# Patient Record
Sex: MALE | Race: WHITE | NOT HISPANIC OR LATINO | Employment: OTHER | URBAN - METROPOLITAN AREA
[De-identification: names, ages, dates, MRNs, and addresses within clinical notes are randomized per-mention and may not be internally consistent; named-entity substitution may affect disease eponyms.]

---

## 2018-01-07 ENCOUNTER — HOSPITAL ENCOUNTER (INPATIENT)
Facility: OTHER | Age: 78
LOS: 1 days | Discharge: HOME HEALTH | DRG: 554 | End: 2018-01-11
Attending: EMERGENCY MEDICINE | Admitting: HOSPITALIST
Payer: MEDICARE

## 2018-01-07 DIAGNOSIS — M25.061 HEMARTHROSIS INVOLVING KNEE JOINT, RIGHT: ICD-10-CM

## 2018-01-07 DIAGNOSIS — I10 ESSENTIAL HYPERTENSION: ICD-10-CM

## 2018-01-07 DIAGNOSIS — D72.829 LEUKOCYTOSIS, UNSPECIFIED TYPE: ICD-10-CM

## 2018-01-07 DIAGNOSIS — L03.115 CELLULITIS OF RIGHT KNEE: ICD-10-CM

## 2018-01-07 DIAGNOSIS — Z79.01 CHRONIC ANTICOAGULATION: ICD-10-CM

## 2018-01-07 DIAGNOSIS — I50.42 HEART FAILURE, SYSTOLIC AND DIASTOLIC, CHRONIC: ICD-10-CM

## 2018-01-07 DIAGNOSIS — M25.561 ACUTE PAIN OF RIGHT KNEE: Primary | ICD-10-CM

## 2018-01-07 DIAGNOSIS — I48.20 CHRONIC ATRIAL FIBRILLATION: ICD-10-CM

## 2018-01-07 DIAGNOSIS — E78.00 HYPERCHOLESTEROLEMIA: ICD-10-CM

## 2018-01-07 DIAGNOSIS — I50.9 CHF (CONGESTIVE HEART FAILURE): ICD-10-CM

## 2018-01-07 LAB
ANION GAP SERPL CALC-SCNC: 10 MMOL/L
BACTERIA #/AREA URNS HPF: NORMAL /HPF
BASOPHILS # BLD AUTO: 0.02 K/UL
BASOPHILS NFR BLD: 0.1 %
BILIRUB UR QL STRIP: NEGATIVE
BUN SERPL-MCNC: 22 MG/DL
CALCIUM SERPL-MCNC: 9.4 MG/DL
CHLORIDE SERPL-SCNC: 95 MMOL/L
CLARITY UR: CLEAR
CO2 SERPL-SCNC: 27 MMOL/L
COLOR UR: YELLOW
CREAT SERPL-MCNC: 1.1 MG/DL
CRP SERPL-MCNC: 119.9 MG/L
DIFFERENTIAL METHOD: ABNORMAL
EOSINOPHIL # BLD AUTO: 0.1 K/UL
EOSINOPHIL NFR BLD: 0.7 %
ERYTHROCYTE [DISTWIDTH] IN BLOOD BY AUTOMATED COUNT: 15.4 %
ERYTHROCYTE [SEDIMENTATION RATE] IN BLOOD BY WESTERGREN METHOD: 38 MM/HR
EST. GFR  (AFRICAN AMERICAN): >60 ML/MIN/1.73 M^2
EST. GFR  (NON AFRICAN AMERICAN): >60 ML/MIN/1.73 M^2
GLUCOSE SERPL-MCNC: 124 MG/DL
GLUCOSE UR QL STRIP: NEGATIVE
HCT VFR BLD AUTO: 36.5 %
HGB BLD-MCNC: 12.3 G/DL
HGB UR QL STRIP: ABNORMAL
HYALINE CASTS #/AREA URNS LPF: 0 /LPF
KETONES UR QL STRIP: NEGATIVE
LEUKOCYTE ESTERASE UR QL STRIP: NEGATIVE
LYMPHOCYTES # BLD AUTO: 1.8 K/UL
LYMPHOCYTES NFR BLD: 11.1 %
MCH RBC QN AUTO: 28.9 PG
MCHC RBC AUTO-ENTMCNC: 33.7 G/DL
MCV RBC AUTO: 86 FL
MICROSCOPIC COMMENT: NORMAL
MONOCYTES # BLD AUTO: 2.1 K/UL
MONOCYTES NFR BLD: 12.3 %
NEUTROPHILS # BLD AUTO: 12.5 K/UL
NEUTROPHILS NFR BLD: 74.8 %
NITRITE UR QL STRIP: NEGATIVE
PH UR STRIP: 6 [PH] (ref 5–8)
PLATELET # BLD AUTO: 360 K/UL
PMV BLD AUTO: 9.9 FL
POTASSIUM SERPL-SCNC: 3.9 MMOL/L
PROT UR QL STRIP: ABNORMAL
RBC # BLD AUTO: 4.25 M/UL
RBC #/AREA URNS HPF: 2 /HPF (ref 0–4)
SODIUM SERPL-SCNC: 132 MMOL/L
SP GR UR STRIP: 1.01 (ref 1–1.03)
SQUAMOUS #/AREA URNS HPF: 4 /HPF
URN SPEC COLLECT METH UR: ABNORMAL
UROBILINOGEN UR STRIP-ACNC: NEGATIVE EU/DL
WBC # BLD AUTO: 16.64 K/UL
WBC #/AREA URNS HPF: 0 /HPF (ref 0–5)
WBC CLUMPS URNS QL MICRO: NORMAL
YEAST URNS QL MICRO: NORMAL

## 2018-01-07 PROCEDURE — 87040 BLOOD CULTURE FOR BACTERIA: CPT

## 2018-01-07 PROCEDURE — 80048 BASIC METABOLIC PNL TOTAL CA: CPT

## 2018-01-07 PROCEDURE — 85651 RBC SED RATE NONAUTOMATED: CPT

## 2018-01-07 PROCEDURE — 96361 HYDRATE IV INFUSION ADD-ON: CPT

## 2018-01-07 PROCEDURE — 81000 URINALYSIS NONAUTO W/SCOPE: CPT

## 2018-01-07 PROCEDURE — 96365 THER/PROPH/DIAG IV INF INIT: CPT

## 2018-01-07 PROCEDURE — 99284 EMERGENCY DEPT VISIT MOD MDM: CPT | Mod: 25

## 2018-01-07 PROCEDURE — 25000003 PHARM REV CODE 250: Performed by: EMERGENCY MEDICINE

## 2018-01-07 PROCEDURE — 85025 COMPLETE CBC W/AUTO DIFF WBC: CPT

## 2018-01-07 PROCEDURE — 86140 C-REACTIVE PROTEIN: CPT

## 2018-01-07 PROCEDURE — G0378 HOSPITAL OBSERVATION PER HR: HCPCS

## 2018-01-07 PROCEDURE — 96375 TX/PRO/DX INJ NEW DRUG ADDON: CPT

## 2018-01-07 PROCEDURE — 11000001 HC ACUTE MED/SURG PRIVATE ROOM

## 2018-01-07 PROCEDURE — 63600175 PHARM REV CODE 636 W HCPCS: Performed by: EMERGENCY MEDICINE

## 2018-01-07 RX ORDER — SODIUM CHLORIDE 9 MG/ML
1000 INJECTION, SOLUTION INTRAVENOUS
Status: COMPLETED | OUTPATIENT
Start: 2018-01-07 | End: 2018-01-07

## 2018-01-07 RX ORDER — CEFTRIAXONE 1 G/1
1 INJECTION, POWDER, FOR SOLUTION INTRAMUSCULAR; INTRAVENOUS
Status: COMPLETED | OUTPATIENT
Start: 2018-01-07 | End: 2018-01-07

## 2018-01-07 RX ORDER — MORPHINE SULFATE 2 MG/ML
6 INJECTION, SOLUTION INTRAMUSCULAR; INTRAVENOUS
Status: COMPLETED | OUTPATIENT
Start: 2018-01-07 | End: 2018-01-07

## 2018-01-07 RX ADMIN — MORPHINE SULFATE 6 MG: 2 INJECTION, SOLUTION INTRAMUSCULAR; INTRAVENOUS at 10:01

## 2018-01-07 RX ADMIN — VANCOMYCIN HYDROCHLORIDE 1250 MG: 1 INJECTION, POWDER, LYOPHILIZED, FOR SOLUTION INTRAVENOUS at 11:01

## 2018-01-07 RX ADMIN — SODIUM CHLORIDE 1000 ML: 0.9 INJECTION, SOLUTION INTRAVENOUS at 09:01

## 2018-01-07 RX ADMIN — CEFTRIAXONE 1 G: 1 INJECTION, POWDER, FOR SOLUTION INTRAMUSCULAR; INTRAVENOUS at 11:01

## 2018-01-08 PROBLEM — L03.115 CELLULITIS OF RIGHT KNEE: Status: ACTIVE | Noted: 2018-01-07

## 2018-01-08 LAB
DIASTOLIC DYSFUNCTION: NO
ESTIMATED PA SYSTOLIC PRESSURE: 31.52
GLOBAL PERICARDIAL EFFUSION: NORMAL
RETIRED EF AND QEF - SEE NOTES: 50 (ref 55–65)
VANCOMYCIN TROUGH SERPL-MCNC: 5.4 UG/ML

## 2018-01-08 PROCEDURE — 99220 PR INITIAL OBSERVATION CARE,LEVL III: CPT | Mod: ,,, | Performed by: NURSE PRACTITIONER

## 2018-01-08 PROCEDURE — 93306 TTE W/DOPPLER COMPLETE: CPT

## 2018-01-08 PROCEDURE — G0378 HOSPITAL OBSERVATION PER HR: HCPCS

## 2018-01-08 PROCEDURE — 87070 CULTURE OTHR SPECIMN AEROBIC: CPT

## 2018-01-08 PROCEDURE — 25000003 PHARM REV CODE 250

## 2018-01-08 PROCEDURE — 63600175 PHARM REV CODE 636 W HCPCS: Performed by: NURSE PRACTITIONER

## 2018-01-08 PROCEDURE — 11000001 HC ACUTE MED/SURG PRIVATE ROOM

## 2018-01-08 PROCEDURE — 25000003 PHARM REV CODE 250: Performed by: NURSE PRACTITIONER

## 2018-01-08 PROCEDURE — 87075 CULTR BACTERIA EXCEPT BLOOD: CPT

## 2018-01-08 PROCEDURE — 36415 COLL VENOUS BLD VENIPUNCTURE: CPT

## 2018-01-08 PROCEDURE — 80202 ASSAY OF VANCOMYCIN: CPT

## 2018-01-08 PROCEDURE — 0S9C3ZX DRAINAGE OF RIGHT KNEE JOINT, PERCUTANEOUS APPROACH, DIAGNOSTIC: ICD-10-PCS | Performed by: ORTHOPAEDIC SURGERY

## 2018-01-08 RX ORDER — ALLOPURINOL 100 MG/1
100 TABLET ORAL DAILY
Status: DISCONTINUED | OUTPATIENT
Start: 2018-01-08 | End: 2018-01-11 | Stop reason: HOSPADM

## 2018-01-08 RX ORDER — MORPHINE SULFATE 2 MG/ML
6 INJECTION, SOLUTION INTRAMUSCULAR; INTRAVENOUS EVERY 4 HOURS PRN
Status: DISCONTINUED | OUTPATIENT
Start: 2018-01-08 | End: 2018-01-11 | Stop reason: HOSPADM

## 2018-01-08 RX ORDER — FINASTERIDE 5 MG/1
5 TABLET, FILM COATED ORAL DAILY
Status: DISCONTINUED | OUTPATIENT
Start: 2018-01-08 | End: 2018-01-11 | Stop reason: HOSPADM

## 2018-01-08 RX ORDER — IBUPROFEN 200 MG
200 CAPSULE ORAL DAILY
Status: DISCONTINUED | OUTPATIENT
Start: 2018-01-08 | End: 2018-01-11 | Stop reason: HOSPADM

## 2018-01-08 RX ORDER — DIGOXIN 125 MCG
0.12 TABLET ORAL DAILY
Status: DISCONTINUED | OUTPATIENT
Start: 2018-01-08 | End: 2018-01-09

## 2018-01-08 RX ORDER — METOPROLOL SUCCINATE 25 MG/1
25 TABLET, EXTENDED RELEASE ORAL DAILY
Status: DISCONTINUED | OUTPATIENT
Start: 2018-01-08 | End: 2018-01-11 | Stop reason: HOSPADM

## 2018-01-08 RX ORDER — ROSUVASTATIN CALCIUM 10 MG/1
40 TABLET, COATED ORAL DAILY
Status: DISCONTINUED | OUTPATIENT
Start: 2018-01-08 | End: 2018-01-11 | Stop reason: HOSPADM

## 2018-01-08 RX ORDER — CLONIDINE HYDROCHLORIDE 0.1 MG/1
0.1 TABLET ORAL 3 TIMES DAILY
Status: DISCONTINUED | OUTPATIENT
Start: 2018-01-08 | End: 2018-01-11 | Stop reason: HOSPADM

## 2018-01-08 RX ORDER — CHOLECALCIFEROL (VITAMIN D3) 25 MCG
1000 TABLET ORAL DAILY
Status: DISCONTINUED | OUTPATIENT
Start: 2018-01-08 | End: 2018-01-11 | Stop reason: HOSPADM

## 2018-01-08 RX ORDER — FUROSEMIDE 20 MG/1
20 TABLET ORAL DAILY
Status: DISCONTINUED | OUTPATIENT
Start: 2018-01-08 | End: 2018-01-11 | Stop reason: HOSPADM

## 2018-01-08 RX ORDER — OXYCODONE AND ACETAMINOPHEN 5; 325 MG/1; MG/1
1 TABLET ORAL EVERY 4 HOURS PRN
Status: DISCONTINUED | OUTPATIENT
Start: 2018-01-08 | End: 2018-01-11 | Stop reason: HOSPADM

## 2018-01-08 RX ADMIN — CLONIDINE HYDROCHLORIDE 0.1 MG: 0.1 TABLET ORAL at 01:01

## 2018-01-08 RX ADMIN — FINASTERIDE 5 MG: 5 TABLET, FILM COATED ORAL at 09:01

## 2018-01-08 RX ADMIN — VITAMIN D, TAB 1000IU (100/BT) 1000 UNITS: 25 TAB at 09:01

## 2018-01-08 RX ADMIN — CLONIDINE HYDROCHLORIDE 0.1 MG: 0.1 TABLET ORAL at 05:01

## 2018-01-08 RX ADMIN — CALCIUM CITRATE 200 MG (950 MG) TABLET 200 MG: at 09:01

## 2018-01-08 RX ADMIN — ROSUVASTATIN CALCIUM 40 MG: 10 TABLET, FILM COATED ORAL at 09:01

## 2018-01-08 RX ADMIN — CLONIDINE HYDROCHLORIDE 0.1 MG: 0.1 TABLET ORAL at 09:01

## 2018-01-08 RX ADMIN — OXYCODONE HYDROCHLORIDE AND ACETAMINOPHEN 1 TABLET: 5; 325 TABLET ORAL at 01:01

## 2018-01-08 RX ADMIN — OXYCODONE HYDROCHLORIDE AND ACETAMINOPHEN 1 TABLET: 5; 325 TABLET ORAL at 09:01

## 2018-01-08 RX ADMIN — VANCOMYCIN HYDROCHLORIDE 1250 MG: 10 INJECTION, POWDER, LYOPHILIZED, FOR SOLUTION INTRAVENOUS at 11:01

## 2018-01-08 RX ADMIN — METOPROLOL SUCCINATE 25 MG: 25 TABLET, EXTENDED RELEASE ORAL at 09:01

## 2018-01-08 RX ADMIN — FUROSEMIDE 20 MG: 20 TABLET ORAL at 09:01

## 2018-01-08 RX ADMIN — OXYCODONE HYDROCHLORIDE AND ACETAMINOPHEN 1 TABLET: 5; 325 TABLET ORAL at 05:01

## 2018-01-08 RX ADMIN — ALLOPURINOL 100 MG: 100 TABLET ORAL at 09:01

## 2018-01-08 RX ADMIN — DIGOXIN 0.12 MG: 125 TABLET ORAL at 09:01

## 2018-01-08 NOTE — SUBJECTIVE & OBJECTIVE
Past Medical History:   Diagnosis Date    A-fib     Carotid artery stenosis 8/28/2014 8/26/2014: Carotid duplex: DIANNA: mild. LICA: critical - 3.4 m/s.    CHF (congestive heart failure)     Chronic anticoagulation 8/28/2014    Heart failure, systolic and diastolic, chronic 11/17/2016 8/1/2016: Echo: Normal left ventricular size with mild systolic dysfunction. EF 45%. Markedly dilated LA. Moderate aortic valve sclerosis - 1.6 m/s.    Herniated disc     Hypercholesterolemia 11/17/2016 2004: Began statin.    Hypertension     Hypertension 11/17/2016 1990: Diagnosed.    Lipids abnormal     OA (osteoarthritis)     Osteopenia     Peripheral artery disease 12/1/2016    10/25/2016: Arterial Duplex: Right: SFA: Distal 1.8 m/s. Left: No obstructive disease above knee.    Stroke     2004       Past Surgical History:   Procedure Laterality Date    BLADDER TUMOR EXCISION      CAROTID ENDARTERECTOMY      Left    EYE SURGERY      bilateral kory    HERNIA REPAIR      knee scope Right     SKIN BIOPSY      ca    TONSILLECTOMY      tumors removed chest Bilateral        Review of patient's allergies indicates:  No Known Allergies    No current facility-administered medications on file prior to encounter.      Current Outpatient Prescriptions on File Prior to Encounter   Medication Sig    allopurinol (ZYLOPRIM) 100 MG tablet Take 100 mg by mouth once daily.    ascorbic acid (VITAMIN C) 100 MG tablet Take 100 mg by mouth once daily.    calcium citrate (CALCITRATE) 200 mg (950 mg) tablet Take 1 tablet by mouth once daily.    cloNIDine (CATAPRES) 0.1 MG tablet TK 1 T PO Q 8 H    co-enzyme Q-10 50 mg capsule Take 50 mg by mouth once daily.    desonide (DESOWEN) 0.05 % lotion     digoxin (LANOXIN) 125 mcg tablet TAKE 1 TABLET ONE TIME DAILY    finasteride (PROSCAR) 5 mg tablet Take 5 mg by mouth once daily.    fish oil-omega-3 fatty acids 300-1,000 mg capsule Take 2 g by mouth once daily.     furosemide (LASIX) 40 MG tablet Take 1 tablet (40 mg total) by mouth once daily. (Patient taking differently: Take 20 mg by mouth once daily. )    glucosamine-chondroitin 500-400 mg tablet Take 1 tablet by mouth 3 (three) times daily.    metoprolol succinate (TOPROL-XL) 25 MG 24 hr tablet TAKE 1 TABLET ONE TIME DAILY    MULTIVITAMIN W-MINERALS/LUTEIN (CENTRUM SILVER ORAL) Take by mouth.    mupirocin (BACTROBAN) 2 % ointment Apply topically 3 (three) times daily.    oxycodone-acetaminophen (PERCOCET) 5-325 mg per tablet TK 1 T PO  Q 6 H PRN P    potassium chloride (MICRO-K) 10 MEQ CpSR     PREVNAR 13, PF, 0.5 mL Syrg     rosuvastatin (CRESTOR) 20 MG tablet Take 40 mg by mouth once daily.     vitamin D 1000 units Tab Take 185 mg by mouth once daily.    warfarin (COUMADIN) 5 MG tablet TAKE 1 TABLET DAILY. DOSE TO BE ADJUSTED ACCORDING TO INR.     Family History     None        Social History Main Topics    Smoking status: Never Smoker    Smokeless tobacco: Never Used    Alcohol use Yes      Comment: couple times yearly     Drug use: No    Sexual activity: Not on file     Review of Systems   Constitutional: Positive for activity change, fatigue and fever. Negative for appetite change.   HENT: Negative for congestion, ear pain and postnasal drip.    Eyes: Negative for discharge.   Respiratory: Negative for apnea, cough, shortness of breath and wheezing.    Cardiovascular: Negative for chest pain and leg swelling.   Gastrointestinal: Negative for abdominal distention, abdominal pain, diarrhea, nausea and vomiting.   Endocrine: Negative for polydipsia, polyphagia and polyuria.   Genitourinary: Negative for difficulty urinating, flank pain, frequency, hematuria and urgency.   Musculoskeletal: Positive for arthralgias, gait problem and joint swelling.   Skin: Negative for pallor and rash.   Allergic/Immunologic: Negative for environmental allergies and food allergies.   Neurological: Negative for dizziness,  speech difficulty, weakness, light-headedness and headaches.   Hematological: Does not bruise/bleed easily.   Psychiatric/Behavioral: Negative for agitation.     Objective:     Vital Signs (Most Recent):  Temp: 98.5 °F (36.9 °C) (01/08/18 0109)  Pulse: 97 (01/08/18 0110)  Resp: 16 (01/08/18 0109)  BP: 136/67 (01/08/18 0110)  SpO2: 99 % (01/08/18 0109) Vital Signs (24h Range):  Temp:  [98.3 °F (36.8 °C)-98.5 °F (36.9 °C)] 98.5 °F (36.9 °C)  Pulse:  [75-97] 97  Resp:  [16] 16  SpO2:  [95 %-99 %] 99 %  BP: (131-144)/(67-77) 136/67     Weight: 80.5 kg (177 lb 6.4 oz)  Body mass index is 26.97 kg/m².    Physical Exam   Constitutional: He is oriented to person, place, and time. He appears well-developed and well-nourished.   HENT:   Head: Normocephalic.   Eyes: Conjunctivae are normal.   Neck: Normal range of motion. Neck supple.   Cardiovascular: S2 normal and intact distal pulses.  An irregularly irregular rhythm present. Tachycardia present.    Pulmonary/Chest: Effort normal. He has decreased breath sounds in the right lower field.   Abdominal: Soft. Bowel sounds are normal. He exhibits no distension. There is no tenderness.   Musculoskeletal:        Right knee: He exhibits decreased range of motion, swelling and erythema.   Neurological: He is alert and oriented to person, place, and time.   Skin: Skin is warm and dry.   Psychiatric: He has a normal mood and affect. His speech is normal and behavior is normal. Thought content normal.           Significant Labs:   CBC:   Recent Labs  Lab 01/06/18 1821 01/07/18 2130   WBC 22.10* 16.64*   HGB 14.3 12.3*   HCT 43.6 36.5*   * 360*     CMP:   Recent Labs  Lab 01/06/18  1821 01/07/18  2130    132*   K 4.0 3.9   CL 99 95   CO2 28 27   * 124*   BUN 25* 22   CREATININE 1.2 1.1   CALCIUM 9.2 9.4   PROT 7.2  --    ALBUMIN 3.5  --    BILITOT 0.6  --    ALKPHOS 57  --    AST 17  --    ALT 18  --    ANIONGAP 11 10   EGFRNONAA 57* >60     Coagulation:   Recent  Labs  Lab 01/06/18 1958   INR 4.4*       Significant Imaging: I have reviewed all pertinent imaging results/findings within the past 24 hours.

## 2018-01-08 NOTE — HPI
"Patient is a 77 y.o. male who presents to the ED with complaint of right knee pain. He has had intermittent chronic right knee pain for over twenty years s/p arthroscopic knee surgery, and is followed by Dr. Gonzalez, orthopedic surgery. Wife reports onset of current episode of knee pain two weeks ago, and states "this is the worst it has ever been." Patient woke up unable to walk or bear weight secondary to pain two weeks ago. He was seen by Dr. Gonzalez at that time and reportedly had fluid drained and was given a cortisone shot with temporary improvement. 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 Gonzalez's PA two days ago and had fluid drained and was given cortisone injection again without relief. He was seen here yesterday for intractable pain, and had a dry tap. He reports temporary relief to pain with morphine yesterday. Patient reports worsening of pain since being discharged yesterday, and currently complains of pain and swelling of the knee. Pain radiates from the knee into the distal thigh and is worse with movement. He is unable to bear weight secondary to pain. He denies fever, nausea, or vomiting. He has an appointment with physical therapy in two days, and is "starting the process of having the knee replaced." He has no additional complaints.  "

## 2018-01-08 NOTE — H&P
"[facility]  History & Physical    Patient Name: Alan Gutiérrez  MRN: [id number]  Admission Date: 1/7/2018  Attending Physician: Shirin Martinez MD   Primary Care Provider: Dave Luna Sr, MD         Patient information was obtained from patient, past medical records and ER records.     Subjective:     Principal Problem:Cellulitis of right knee    Chief Complaint:   Chief Complaint   Patient presents with    Knee Pain     "Im here for the same thing I was here for yesterday, my right knee hurts"        HPI: Patient is a 77 y.o. male who presents to the ED with complaint of right knee pain. He has had intermittent chronic right knee pain for over twenty years s/p arthroscopic knee surgery, and is followed by Dr. Gonzalez, orthopedic surgery. Wife reports onset of current episode of knee pain two weeks ago, and states "this is the worst it has ever been." Patient woke up unable to walk or bear weight secondary to pain two weeks ago. He was seen by Dr. Gonzalez at that time and reportedly had fluid drained and was given a cortisone shot with temporary improvement. 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 Gonzalez's PA two days ago and had fluid drained and was given cortisone injection again without relief. He was seen here yesterday for intractable pain, and had a dry tap. He reports temporary relief to pain with morphine yesterday. Patient reports worsening of pain since being discharged yesterday, and currently complains of pain and swelling of the knee. Pain radiates from the knee into the distal thigh and is worse with movement. He is unable to bear weight secondary to pain. He denies fever, nausea, or vomiting. He has an appointment with physical therapy in two days, and is "starting the process of having the knee replaced." He has no additional complaints.    Past Medical History: "   Diagnosis Date    A-fib     Carotid artery stenosis 8/28/2014 8/26/2014: Carotid duplex: DIANNA: mild. LICA: critical - 3.4 m/s.    CHF (congestive heart failure)     Chronic anticoagulation 8/28/2014    Heart failure, systolic and diastolic, chronic 11/17/2016 8/1/2016: Echo: Normal left ventricular size with mild systolic dysfunction. EF 45%. Markedly dilated LA. Moderate aortic valve sclerosis - 1.6 m/s.    Herniated disc     Hypercholesterolemia 11/17/2016 2004: Began statin.    Hypertension     Hypertension 11/17/2016 1990: Diagnosed.    Lipids abnormal     OA (osteoarthritis)     Osteopenia     Peripheral artery disease 12/1/2016    10/25/2016: Arterial Duplex: Right: SFA: Distal 1.8 m/s. Left: No obstructive disease above knee.    Stroke     2004       Past Surgical History:   Procedure Laterality Date    BLADDER TUMOR EXCISION      CAROTID ENDARTERECTOMY      Left    EYE SURGERY      bilateral kory    HERNIA REPAIR      knee scope Right     SKIN BIOPSY      ca    TONSILLECTOMY      tumors removed chest Bilateral        Review of patient's allergies indicates:  No Known Allergies    No current facility-administered medications on file prior to encounter.      Current Outpatient Prescriptions on File Prior to Encounter   Medication Sig    allopurinol (ZYLOPRIM) 100 MG tablet Take 100 mg by mouth once daily.    ascorbic acid (VITAMIN C) 100 MG tablet Take 100 mg by mouth once daily.    calcium citrate (CALCITRATE) 200 mg (950 mg) tablet Take 1 tablet by mouth once daily.    cloNIDine (CATAPRES) 0.1 MG tablet TK 1 T PO Q 8 H    co-enzyme Q-10 50 mg capsule Take 50 mg by mouth once daily.    desonide (DESOWEN) 0.05 % lotion     digoxin (LANOXIN) 125 mcg tablet TAKE 1 TABLET ONE TIME DAILY    finasteride (PROSCAR) 5 mg tablet Take 5 mg by mouth once daily.    fish oil-omega-3 fatty acids 300-1,000 mg capsule Take 2 g by mouth once daily.    furosemide (LASIX) 40 MG  tablet Take 1 tablet (40 mg total) by mouth once daily. (Patient taking differently: Take 20 mg by mouth once daily. )    glucosamine-chondroitin 500-400 mg tablet Take 1 tablet by mouth 3 (three) times daily.    metoprolol succinate (TOPROL-XL) 25 MG 24 hr tablet TAKE 1 TABLET ONE TIME DAILY    MULTIVITAMIN W-MINERALS/LUTEIN (CENTRUM SILVER ORAL) Take by mouth.    mupirocin (BACTROBAN) 2 % ointment Apply topically 3 (three) times daily.    oxycodone-acetaminophen (PERCOCET) 5-325 mg per tablet TK 1 T PO  Q 6 H PRN P    potassium chloride (MICRO-K) 10 MEQ CpSR     PREVNAR 13, PF, 0.5 mL Syrg     rosuvastatin (CRESTOR) 20 MG tablet Take 40 mg by mouth once daily.     vitamin D 1000 units Tab Take 185 mg by mouth once daily.    warfarin (COUMADIN) 5 MG tablet TAKE 1 TABLET DAILY. DOSE TO BE ADJUSTED ACCORDING TO INR.     Family History     None        Social History Main Topics    Smoking status: Never Smoker    Smokeless tobacco: Never Used    Alcohol use Yes      Comment: couple times yearly     Drug use: No    Sexual activity: Not on file     Review of Systems   Constitutional: Positive for activity change, fatigue and fever. Negative for appetite change.   HENT: Negative for congestion, ear pain and postnasal drip.    Eyes: Negative for discharge.   Respiratory: Negative for apnea, cough, shortness of breath and wheezing.    Cardiovascular: Negative for chest pain and leg swelling.   Gastrointestinal: Negative for abdominal distention, abdominal pain, diarrhea, nausea and vomiting.   Endocrine: Negative for polydipsia, polyphagia and polyuria.   Genitourinary: Negative for difficulty urinating, flank pain, frequency, hematuria and urgency.   Musculoskeletal: Positive for arthralgias, gait problem and joint swelling.   Skin: Negative for pallor and rash.   Allergic/Immunologic: Negative for environmental allergies and food allergies.   Neurological: Negative for dizziness, speech difficulty,  weakness, light-headedness and headaches.   Hematological: Does not bruise/bleed easily.   Psychiatric/Behavioral: Negative for agitation.     Objective:     Vital Signs (Most Recent):  Temp: 98.5 °F (36.9 °C) (01/08/18 0109)  Pulse: 97 (01/08/18 0110)  Resp: 16 (01/08/18 0109)  BP: 136/67 (01/08/18 0110)  SpO2: 99 % (01/08/18 0109) Vital Signs (24h Range):  Temp:  [98.3 °F (36.8 °C)-98.5 °F (36.9 °C)] 98.5 °F (36.9 °C)  Pulse:  [75-97] 97  Resp:  [16] 16  SpO2:  [95 %-99 %] 99 %  BP: (131-144)/(67-77) 136/67     Weight: 80.5 kg (177 lb 6.4 oz)  Body mass index is 26.97 kg/m².    Physical Exam   Constitutional: He is oriented to person, place, and time. He appears well-developed and well-nourished.   HENT:   Head: Normocephalic.   Eyes: Conjunctivae are normal.   Neck: Normal range of motion. Neck supple.   Cardiovascular: S2 normal and intact distal pulses.  An irregularly irregular rhythm present. Tachycardia present.    Pulmonary/Chest: Effort normal. He has decreased breath sounds in the right lower field.   Abdominal: Soft. Bowel sounds are normal. He exhibits no distension. There is no tenderness.   Musculoskeletal:        Right knee: He exhibits decreased range of motion, swelling and erythema.   Neurological: He is alert and oriented to person, place, and time.   Skin: Skin is warm and dry.   Psychiatric: He has a normal mood and affect. His speech is normal and behavior is normal. Thought content normal.           Significant Labs:   CBC:   Recent Labs  Lab 01/06/18 1821 01/07/18 2130   WBC 22.10* 16.64*   HGB 14.3 12.3*   HCT 43.6 36.5*   * 360*     CMP:   Recent Labs  Lab 01/06/18 1821 01/07/18 2130    132*   K 4.0 3.9   CL 99 95   CO2 28 27   * 124*   BUN 25* 22   CREATININE 1.2 1.1   CALCIUM 9.2 9.4   PROT 7.2  --    ALBUMIN 3.5  --    BILITOT 0.6  --    ALKPHOS 57  --    AST 17  --    ALT 18  --    ANIONGAP 11 10   EGFRNONAA 57* >60     Coagulation:   Recent Labs  Lab  01/06/18 1958   INR 4.4*       Significant Imaging: I have reviewed all pertinent imaging results/findings within the past 24 hours.    Assessment/Plan:     * Cellulitis of right knee    Right knee reddened, swollen. WBC elevated. Afebrile    Vancomycin/Rocephin continued  Oxygen PRN  Consult Ortho          Heart failure, systolic and diastolic, chronic    Appears stable currently    Echo pending  Continue digoxin, lasix, potassium,           Hypercholesterolemia    Lipid Panel pending  Continue Crestor          Hypertension    Currently normotensive    Continue clonidine, lasix, toprol          Chronic anticoagulation      Continue warfarin  Consult Cardiology        Atrial fibrillation    Chronic afib    Continue toprol, digoxin            VTE Risk Mitigation         Ordered     Medium Risk of VTE  Once      01/08/18 0157     Place sequential compression device  Until discontinued      01/08/18 0157             Fritz Garza NP  Department of Hospital Medicine   [facility] [place]

## 2018-01-08 NOTE — ASSESSMENT & PLAN NOTE
Right knee reddened, swollen. WBC elevated. Afebrile    Vancomycin/Rocephin continued  Oxygen PRN  Consult Ortho

## 2018-01-08 NOTE — PLAN OF CARE
01/08/18 1022   CHASE Message   Medicare Outpatient and Observation Notification regarding financial responsibility Given to patient/caregiver;Explained to patient/caregiver;Signed/date by patient/caregiver   Date CHASE was signed 01/08/18   Time CHASE was signed [id number]

## 2018-01-08 NOTE — PLAN OF CARE
Met with patient & wife at bedside to complete discharge assessment. Patient lives at home with wife who provides minimal assist as needed with ADLs. Patient has several pieces of DME at home but will need a bedside commode. Will follow up on any other DC needs      01/08/18 1022   Discharge Assessment   Assessment Type Discharge Planning Assessment   Confirmed/corrected address and phone number on facesheet? Yes   Assessment information obtained from? Patient   Communicated expected length of stay with patient/caregiver no   Prior to hospitilization cognitive status: Alert/Oriented   Prior to hospitalization functional status: Assistive Equipment;Needs Assistance   Current cognitive status: Alert/Oriented   Current Functional Status: Needs Assistance;Assistive Equipment   Lives With spouse   Able to Return to Prior Arrangements yes   Is patient able to care for self after discharge? Yes   Patient's perception of discharge disposition home or selfcare   Readmission Within The Last 30 Days no previous admission in last 30 days   Patient currently being followed by outpatient case management? No   Patient currently receives any other outside agency services? No   Equipment Currently Used at Home walker, rolling;wheelchair  (shower chair on wheels)   Do you have any problems affording any of your prescribed medications? No   Is the patient taking medications as prescribed? yes   Does the patient have transportation home? Yes   Transportation Available family or friend will provide   Does the patient receive services at the [facility]? No   Discharge Plan A Home with family   Discharge Plan B Home Health   Patient/Family In Agreement With Plan yes

## 2018-01-08 NOTE — ED PROVIDER NOTES
"Encounter Date: 1/7/2018    SCRIBE #1 NOTE: I, Mateo Connsoniajustina, am scribing for, and in the presence of, Dr. Yoder.       History     Chief Complaint   Patient presents with    Knee Pain     "Im here for the same thing I was here for yesterday, my right knee hurts"     8:47 PM    Patient is a 77 y.o. male who presents to the ED with complaint of right knee pain. He has had intermittent chronic right knee pain for over twenty years s/p arthroscopic knee surgery, and is followed by Dr. Gonzalez, orthopedic surgery. Wife reports onset of current episode of knee pain two weeks ago, and states "this is the worst it has ever been." Patient woke up unable to walk or bear weight secondary to pain two weeks ago. He was seen by Dr. Gonzalez at that time and reportedly had fluid drained and was given a cortisone shot with temporary improvement. 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 Gonzalez's PA two days ago and had fluid drained and was given cortisone injection again without relief. He was seen here yesterday for intractable pain, and had a dry tap. He reports temporary relief to pain with morphine yesterday. Patient reports worsening of pain since being discharged yesterday, and currently complains of pain and swelling of the knee. Pain radiates from the knee into the distal thigh and is worse with movement. He is unable to bear weight secondary to pain. He denies fever, nausea, or vomiting. He has an appointment with physical therapy in two days, and is "starting the process of having the knee replaced." He has no additional complaints.        The history is provided by the patient and the spouse.     Review of patient's allergies indicates:  No Known Allergies  Past Medical History:   Diagnosis Date    A-fib     Carotid artery stenosis 8/28/2014 8/26/2014: Carotid duplex: DIANNA: mild. LICA: critical - 3.4 m/s.    CHF " (congestive heart failure)     Chronic anticoagulation 8/28/2014    Heart failure, systolic and diastolic, chronic 11/17/2016 8/1/2016: Echo: Normal left ventricular size with mild systolic dysfunction. EF 45%. Markedly dilated LA. Moderate aortic valve sclerosis - 1.6 m/s.    Herniated disc     Hypercholesterolemia 11/17/2016    2004: Began statin.    Hypertension     Hypertension 11/17/2016 1990: Diagnosed.    Lipids abnormal     OA (osteoarthritis)     Osteopenia     Peripheral artery disease 12/1/2016    10/25/2016: Arterial Duplex: Right: SFA: Distal 1.8 m/s. Left: No obstructive disease above knee.    Stroke     2004     Past Surgical History:   Procedure Laterality Date    BLADDER TUMOR EXCISION      CAROTID ENDARTERECTOMY      Left    EYE SURGERY      bilateral kory    HERNIA REPAIR      knee scope Right     SKIN BIOPSY      ca    TONSILLECTOMY      tumors removed chest Bilateral      History reviewed. No pertinent family history.  Social History   Substance Use Topics    Smoking status: Never Smoker    Smokeless tobacco: Never Used    Alcohol use Yes      Comment: couple times yearly      Review of Systems   Constitutional: Negative for fever.   HENT: Negative for sore throat.    Respiratory: Negative for shortness of breath.    Cardiovascular: Negative for chest pain.   Gastrointestinal: Negative for nausea and vomiting.   Genitourinary: Negative for dysuria.   Musculoskeletal: Negative for back pain.        Positive for right knee pain.   Skin: Negative for rash.   Neurological: Negative for weakness.   Hematological: Does not bruise/bleed easily.   Psychiatric/Behavioral: Negative for confusion.       Physical Exam     Initial Vitals [01/07/18 2025]   BP Pulse Resp Temp SpO2   (!) 144/77 75 16 98.3 °F (36.8 °C) 98 %      MAP       99.33         Physical Exam    Nursing note and vitals reviewed.  Constitutional: He appears well-developed and well-nourished. No distress.   HENT:    Head: Normocephalic and atraumatic.   Eyes: Conjunctivae and EOM are normal.   Neck: Normal range of motion. Neck supple.   Cardiovascular: Normal rate, regular rhythm and normal heart sounds. Exam reveals no gallop and no friction rub.    No murmur heard.  Pulmonary/Chest: Breath sounds normal. No respiratory distress. He has no wheezes. He has no rhonchi. He has no rales.   Musculoskeletal:   Right knee is warm and swollen, but not ballotable or erythematous. Painful range of motion of the knee. Rest of joints are normal.   Neurological: He is alert and oriented to person, place, and time.   Skin: Skin is warm and dry.   No skin lesions or ecchymosis.   Psychiatric: He has a normal mood and affect. His behavior is normal. Judgment and thought content normal.         ED Course   Procedures  Labs Reviewed   BASIC METABOLIC PANEL - Abnormal; Notable for the following:        Result Value    Sodium 132 (*)     Glucose 124 (*)     All other components within normal limits   CBC W/ AUTO DIFFERENTIAL - Abnormal; Notable for the following:     WBC 16.64 (*)     RBC 4.25 (*)     Hemoglobin 12.3 (*)     Hematocrit 36.5 (*)     RDW 15.4 (*)     Platelets 360 (*)     Gran # 12.5 (*)     Mono # 2.1 (*)     Gran% 74.8 (*)     Lymph% 11.1 (*)     All other components within normal limits   URINALYSIS - Abnormal; Notable for the following:     Protein, UA 1+ (*)     Occult Blood UA Trace (*)     All other components within normal limits   C-REACTIVE PROTEIN - Abnormal; Notable for the following:     .9 (*)     All other components within normal limits   SEDIMENTATION RATE, MANUAL - Abnormal; Notable for the following:     Sed Rate 38 (*)     All other components within normal limits   CULTURE, BLOOD   CULTURE, BLOOD   URINALYSIS MICROSCOPIC             Medical Decision Making:   ED Management:  Elderly patient with chronic knee pain presents complaining of pain and swelling of the same knee.  He was evaluated here  yesterday.  I've reviewed this chart and spoken with the evaluating provider.  At that time arthrocentesis was attempted but unsuccessful.  Patient is felt to be low risk for septic arthritis and was discharged with pain medicine.  He reports worsening pain tonight.  Unable to bear weight, unable to care for by his elderly wife at home.  Here for admission for pain control and orthopedic evaluation.  I do not think it reasonable to attempt arthrocentesis again after this painful procedure was unsuccessful yesterday.  His leukocytosis is decreasing, however CRP is increasing.  I will draw blood cultures and start antibiotics until he may be evaluated by orthopedics in the morning.    I did have an extensive talk regarding signs to return for and need for follow up. Patient expressed understanding and will monitor symptoms closely and follow-up as needed.    MAURY Yoder M.D.  01/08/2018  5:21 AM              Scribe Attestation:   Scribe #1: I performed the above scribed service and the documentation accurately describes the services I performed. I attest to the accuracy of the note.    Attending Attestation:           Physician Attestation for Scribe:  Physician Attestation Statement for Scribe #1: I, Dr. Yoder, reviewed documentation, as scribed by Mateo Leslie in my presence, and it is both accurate and complete.                 ED Course      Clinical Impression:     1. Acute pain of right knee    2. CHF (congestive heart failure)                             Glen Yoder MD  01/08/18 0521

## 2018-01-09 PROBLEM — D72.829 LEUKOCYTOSIS: Status: ACTIVE | Noted: 2018-01-09

## 2018-01-09 LAB — URATE SERPL-MCNC: 4.8 MG/DL

## 2018-01-09 PROCEDURE — 25000003 PHARM REV CODE 250

## 2018-01-09 PROCEDURE — 63600175 PHARM REV CODE 636 W HCPCS: Performed by: NURSE PRACTITIONER

## 2018-01-09 PROCEDURE — 25000003 PHARM REV CODE 250: Performed by: NURSE PRACTITIONER

## 2018-01-09 PROCEDURE — 36415 COLL VENOUS BLD VENIPUNCTURE: CPT

## 2018-01-09 PROCEDURE — G0378 HOSPITAL OBSERVATION PER HR: HCPCS

## 2018-01-09 PROCEDURE — 84550 ASSAY OF BLOOD/URIC ACID: CPT

## 2018-01-09 PROCEDURE — 11000001 HC ACUTE MED/SURG PRIVATE ROOM

## 2018-01-09 PROCEDURE — 25000003 PHARM REV CODE 250: Performed by: HOSPITALIST

## 2018-01-09 PROCEDURE — 99233 SBSQ HOSP IP/OBS HIGH 50: CPT | Mod: ,,, | Performed by: HOSPITALIST

## 2018-01-09 RX ORDER — LOSARTAN POTASSIUM 100 MG/1
100 TABLET ORAL DAILY
COMMUNITY
End: 2019-03-26

## 2018-01-09 RX ORDER — NIFEDIPINE 60 MG/1
60 TABLET, EXTENDED RELEASE ORAL DAILY
COMMUNITY

## 2018-01-09 RX ORDER — NIFEDIPINE 30 MG/1
30 TABLET, EXTENDED RELEASE ORAL EVERY MORNING
COMMUNITY
End: 2020-10-15

## 2018-01-09 RX ORDER — HYDROCODONE BITARTRATE AND ACETAMINOPHEN 5; 325 MG/1; MG/1
1 TABLET ORAL EVERY 8 HOURS PRN
Status: ON HOLD | COMMUNITY
End: 2018-01-11

## 2018-01-09 RX ORDER — GABAPENTIN 100 MG/1
200 CAPSULE ORAL 3 TIMES DAILY
COMMUNITY
End: 2018-01-12

## 2018-01-09 RX ORDER — ROSUVASTATIN CALCIUM 40 MG/1
40 TABLET, COATED ORAL NIGHTLY
COMMUNITY

## 2018-01-09 RX ORDER — DIGOXIN 125 MCG
0.12 TABLET ORAL NIGHTLY
Status: DISCONTINUED | OUTPATIENT
Start: 2018-01-09 | End: 2018-01-11 | Stop reason: HOSPADM

## 2018-01-09 RX ORDER — POLYETHYLENE GLYCOL 3350 17 G/17G
17 POWDER, FOR SOLUTION ORAL DAILY PRN
Status: DISCONTINUED | OUTPATIENT
Start: 2018-01-09 | End: 2018-01-11 | Stop reason: HOSPADM

## 2018-01-09 RX ADMIN — CEFTRIAXONE 1 G: 1 INJECTION, SOLUTION INTRAVENOUS at 12:01

## 2018-01-09 RX ADMIN — OXYCODONE HYDROCHLORIDE AND ACETAMINOPHEN 1 TABLET: 5; 325 TABLET ORAL at 05:01

## 2018-01-09 RX ADMIN — CLONIDINE HYDROCHLORIDE 0.1 MG: 0.1 TABLET ORAL at 09:01

## 2018-01-09 RX ADMIN — OXYCODONE HYDROCHLORIDE AND ACETAMINOPHEN 1 TABLET: 5; 325 TABLET ORAL at 06:01

## 2018-01-09 RX ADMIN — OXYCODONE HYDROCHLORIDE AND ACETAMINOPHEN 1 TABLET: 5; 325 TABLET ORAL at 11:01

## 2018-01-09 RX ADMIN — CLONIDINE HYDROCHLORIDE 0.1 MG: 0.1 TABLET ORAL at 06:01

## 2018-01-09 RX ADMIN — OXYCODONE HYDROCHLORIDE AND ACETAMINOPHEN 1 TABLET: 5; 325 TABLET ORAL at 01:01

## 2018-01-09 RX ADMIN — VITAMIN D, TAB 1000IU (100/BT) 1000 UNITS: 25 TAB at 09:01

## 2018-01-09 RX ADMIN — VANCOMYCIN HYDROCHLORIDE 1250 MG: 10 INJECTION, POWDER, LYOPHILIZED, FOR SOLUTION INTRAVENOUS at 11:01

## 2018-01-09 RX ADMIN — ALLOPURINOL 100 MG: 100 TABLET ORAL at 09:01

## 2018-01-09 RX ADMIN — FINASTERIDE 5 MG: 5 TABLET, FILM COATED ORAL at 09:01

## 2018-01-09 RX ADMIN — DIGOXIN 0.12 MG: 125 TABLET ORAL at 09:01

## 2018-01-09 RX ADMIN — OXYCODONE HYDROCHLORIDE AND ACETAMINOPHEN 1 TABLET: 5; 325 TABLET ORAL at 12:01

## 2018-01-09 RX ADMIN — METOPROLOL SUCCINATE 25 MG: 25 TABLET, EXTENDED RELEASE ORAL at 09:01

## 2018-01-09 RX ADMIN — CALCIUM CITRATE 200 MG (950 MG) TABLET 200 MG: at 09:01

## 2018-01-09 RX ADMIN — ROSUVASTATIN CALCIUM 40 MG: 10 TABLET, FILM COATED ORAL at 09:01

## 2018-01-09 RX ADMIN — POLYETHYLENE GLYCOL 3350 17 G: 17 POWDER, FOR SOLUTION ORAL at 09:01

## 2018-01-09 RX ADMIN — CLONIDINE HYDROCHLORIDE 0.1 MG: 0.1 TABLET ORAL at 01:01

## 2018-01-09 RX ADMIN — FUROSEMIDE 20 MG: 20 TABLET ORAL at 09:01

## 2018-01-09 NOTE — PLAN OF CARE
Problem: Patient Care Overview  Goal: Plan of Care Review  Outcome: Ongoing (interventions implemented as appropriate)  Patient lying quietly in bed with eyes closed and wife at bedside. Discussed with patient and wife about medication orders, vancomycin trough schedule, and plan of care. Left forearm 20 gauge IV access intact. Patient and wife both verbalized understanding of instructions. Call light within reach. Encouraged patient and wife to call for any and all needs. Patient and wife both verbalized understanding of instructions. Will continue to monitor patient.

## 2018-01-09 NOTE — CONSULTS
[facility]  Cardiology  Consult Note    Patient Name: Alan Gutiérrez  MRN: [id number]  Admission Date: 1/7/2018  Hospital Length of Stay: 0 days  Code Status: Full Code   Attending Provider: Michael Boston MD   Consulting Provider: Zachary Pelayo MD  Primary Care Physician: Dave Luna Sr, MD  Principal Problem:Cellulitis of right knee    Patient information was obtained from patient and ER records.     Inpatient consult to Cardiology  Consult performed by: ZACHARY PELAYO  Consult ordered by: LYNNE LARSEN  Reason for consult: Cardiac follow up        Subjective:     Chief Complaint:  Right knee pain.     HPI:     Alan Gutiérrez is a 76 y.o. male with hypertension and hypercholesterolemia. He is overweight. He has a history of a cerebrovascular accident in 2004 and then had right carotid endarterectomy. He was diagnosed with atrial fibrillation in 2010. He has CHADS2 score of 5 (CHAS2) and a [id number] score of 7 ([id number]) being on warfarin. He has well compensated systolic as well as diastolic heart failure. He underwent left CEA on 9/12/2014. He has received DRAKE injection which has helped his back pain. He has been bothered by swelling of especially the left ankle which has improved after he began wrapping the legs. He denies any chest pain or dyspnea. No palpitations or weak spells. No bleeding.    Now presents with pain and swelling of his right knee.    Past Medical History:   Diagnosis Date    A-fib     Carotid artery stenosis 8/28/2014 8/26/2014: Carotid duplex: DIANNA: mild. LICA: critical - 3.4 m/s.    CHF (congestive heart failure)     Chronic anticoagulation 8/28/2014    Heart failure, systolic and diastolic, chronic 11/17/2016 8/1/2016: Echo: Normal left ventricular size with mild systolic dysfunction. EF 45%. Markedly dilated LA. Moderate aortic valve sclerosis - 1.6 m/s.    Herniated disc     Hypercholesterolemia 11/17/2016    2004: Began statin.     Hypertension     Hypertension 11/17/2016 1990: Diagnosed.    Lipids abnormal     OA (osteoarthritis)     Osteopenia     Peripheral artery disease 12/1/2016    10/25/2016: Arterial Duplex: Right: SFA: Distal 1.8 m/s. Left: No obstructive disease above knee.    Stroke     2004       Past Surgical History:   Procedure Laterality Date    BLADDER TUMOR EXCISION      CAROTID ENDARTERECTOMY      Left    EYE SURGERY      bilateral kory    HERNIA REPAIR      knee scope Right     SKIN BIOPSY      ca    TONSILLECTOMY      tumors removed chest Bilateral        Review of patient's allergies indicates:  No Known Allergies    No current facility-administered medications on file prior to encounter.      Current Outpatient Prescriptions on File Prior to Encounter   Medication Sig    allopurinol (ZYLOPRIM) 100 MG tablet Take 100 mg by mouth once daily.    ascorbic acid (VITAMIN C) 100 MG tablet Take 100 mg by mouth once daily.    calcium citrate (CALCITRATE) 200 mg (950 mg) tablet Take 1 tablet by mouth once daily.    cloNIDine (CATAPRES) 0.1 MG tablet TK 1 T PO Q 8 H    co-enzyme Q-10 50 mg capsule Take 50 mg by mouth once daily.    desonide (DESOWEN) 0.05 % lotion     digoxin (LANOXIN) 125 mcg tablet TAKE 1 TABLET ONE TIME DAILY    finasteride (PROSCAR) 5 mg tablet Take 5 mg by mouth once daily.    fish oil-omega-3 fatty acids 300-1,000 mg capsule Take 2 g by mouth once daily.    furosemide (LASIX) 40 MG tablet Take 1 tablet (40 mg total) by mouth once daily. (Patient taking differently: Take 20 mg by mouth once daily. )    glucosamine-chondroitin 500-400 mg tablet Take 1 tablet by mouth 3 (three) times daily.    metoprolol succinate (TOPROL-XL) 25 MG 24 hr tablet TAKE 1 TABLET ONE TIME DAILY    MULTIVITAMIN W-MINERALS/LUTEIN (CENTRUM SILVER ORAL) Take by mouth.    mupirocin (BACTROBAN) 2 % ointment Apply topically 3 (three) times daily.    oxycodone-acetaminophen (PERCOCET) 5-325 mg per tablet TK 1  T PO  Q 6 H PRN P    potassium chloride (MICRO-K) 10 MEQ CpSR     PREVNAR 13, PF, 0.5 mL Syrg     rosuvastatin (CRESTOR) 20 MG tablet Take 40 mg by mouth once daily.     vitamin D 1000 units Tab Take 185 mg by mouth once daily.    warfarin (COUMADIN) 5 MG tablet TAKE 1 TABLET DAILY. DOSE TO BE ADJUSTED ACCORDING TO INR.     Family History     None        Social History Main Topics    Smoking status: Never Smoker    Smokeless tobacco: Never Used    Alcohol use Yes      Comment: couple times yearly     Drug use: No    Sexual activity: Not on file     Review of Systems   Constitution: Negative for chills, fever, weakness and malaise/fatigue.   HENT: Negative for nosebleeds.    Eyes: Negative for double vision, vision loss in left eye and vision loss in right eye.   Cardiovascular: Positive for leg swelling (right knee). Negative for chest pain, claudication, irregular heartbeat, near-syncope, orthopnea, palpitations, paroxysmal nocturnal dyspnea and syncope.   Respiratory: Negative for cough, hemoptysis, shortness of breath and wheezing.    Endocrine: Negative for cold intolerance and heat intolerance.   Hematologic/Lymphatic: Negative for bleeding problem. Does not bruise/bleed easily.   Skin: Negative for color change and rash.   Musculoskeletal: Positive for joint pain (right knee). Negative for back pain, falls, muscle weakness and myalgias.   Gastrointestinal: Negative for heartburn, hematemesis, hematochezia, hemorrhoids, jaundice, melena, nausea and vomiting.   Genitourinary: Negative for dysuria and hematuria.   Neurological: Negative for dizziness, focal weakness, headaches, light-headedness, loss of balance, numbness and vertigo.   Psychiatric/Behavioral: Negative for altered mental status, depression and memory loss. The patient is not nervous/anxious.    Allergic/Immunologic: Negative for hives and persistent infections.     Objective:     Vital Signs (Most Recent):  Temp: 98.2 °F (36.8 °C)  (01/08/18 2030)  Pulse: 88 (01/08/18 2030)  Resp: 14 (01/08/18 2030)  BP: 129/65 (01/08/18 2030)  SpO2: 97 % (01/08/18 2030) Vital Signs (24h Range):  Temp:  [98.2 °F (36.8 °C)-99.3 °F (37.4 °C)] 98.2 °F (36.8 °C)  Pulse:  [] 88  Resp:  [14-20] 14  SpO2:  [95 %-99 %] 97 %  BP: (115-150)/(65-85) 129/65     Weight: 80.5 kg (177 lb 6.4 oz)  Body mass index is 26.97 kg/m².    SpO2: 97 %  O2 Device (Oxygen Therapy): room air      Intake/Output Summary (Last 24 hours) at 01/08/18 2202  Last data filed at 01/08/18 1700   Gross per 24 hour   Intake             1360 ml   Output              675 ml   Net              685 ml       Lines/Drains/Airways     Peripheral Intravenous Line                 Peripheral IV - Single Lumen 01/07/18 2124 Left;Lateral Forearm 1 day                Physical Exam   Constitutional: He is oriented to person, place, and time. He appears well-developed and well-nourished.  Non-toxic appearance. No distress.   HENT:   Head: Normocephalic and atraumatic.   Nose: Nose normal.   Eyes: Right eye exhibits no discharge. Left eye exhibits no discharge. Right conjunctiva is not injected. Left conjunctiva is not injected. Right pupil is round. Left pupil is round. Pupils are equal.   Neck: Neck supple. No JVD present. Carotid bruit is present. No thyromegaly present.   Bilateral CEA scars   Cardiovascular: Normal rate, S1 normal and S2 normal.  An irregularly irregular rhythm present.  No extrasystoles are present. PMI is not displaced.  Exam reveals no gallop.    Murmur heard.   Midsystolic murmur is present with a grade of 2/6  at the upper right sternal border  Pulses:       Radial pulses are 2+ on the right side, and 2+ on the left side.        Femoral pulses are 2+ on the right side, and 2+ on the left side.       Posterior tibial pulses are 2+ on the right side.   Pulmonary/Chest: Effort normal and breath sounds normal.   Abdominal: Soft. Normal appearance. There is no hepatosplenomegaly. There  is no tenderness.   Musculoskeletal:        Right knee: He exhibits effusion. Tenderness found.        Right ankle: He exhibits no swelling, no ecchymosis and no deformity.        Left ankle: He exhibits no swelling, no ecchymosis and no deformity.   Lymphadenopathy:        Head (right side): No submandibular adenopathy present.        Head (left side): No submandibular adenopathy present.     He has no cervical adenopathy.   Neurological: He is alert and oriented to person, place, and time. He is not disoriented. No cranial nerve deficit.   Skin: Skin is warm, dry and intact. No rash noted. He is not diaphoretic. No cyanosis. Nails show no clubbing.   Psychiatric: He has a normal mood and affect. His speech is normal and behavior is normal. Judgment and thought content normal. Cognition and memory are normal.     Current Medications:     allopurinol  100 mg Oral Daily    calcium citrate  200 mg Oral Daily    [START ON 1/9/2018] cefTRIAXone (ROCEPHIN) IVPB  1 g Intravenous Q24H    cloNIDine  0.1 mg Oral TID    digoxin  0.125 mg Oral Daily    finasteride  5 mg Oral Daily    furosemide  20 mg Oral Daily    metoprolol succinate  25 mg Oral Daily    rosuvastatin  40 mg Oral Daily    vancomycin (VANCOCIN) IVPB  15 mg/kg Intravenous Daily    vitamin D  1,000 Units Oral Daily     Current Laboratory Results:    Recent Results (from the past 24 hour(s))   Blood culture #2 **CANNOT BE ORDERED STAT**    Collection Time: 01/07/18 11:09 PM   Result Value Ref Range    Blood Culture, Routine No Growth to date    2D echo with color flow doppler    Collection Time: 01/08/18 12:11 PM   Result Value Ref Range    EF 50 55 - 65    Diastolic Dysfunction No     Est. PA Systolic Pressure 31.52     Pericardial Effusion NONE      Current Imaging Results:    Imaging Results    None       Date of Procedure: 01/08/2018        TEST DESCRIPTION   Technical Quality: This is a technically adequate study.     General: The patient was in  an irregularly irregular rhythm throughout the study.     Aorta: The aortic root is normal in size, measuring 2.6 cm at sinotubular junction and 3.4 cm at Sinuses of Valsalva.     Left Atrium: The left atrial volume index is moderately enlarged, measuring 45.37 cc/m2.     Left Ventricle: The left ventricle is normal in size, with an end-diastolic diameter of 4.7 cm, and an end-systolic diameter of 4.1 cm. Wall thickness is mildly increased, with the septum and the posterior wall each measuring 1.2 cm across. Relative wall   thickness was increased at 0.51, and the LV mass index was increased at 129.2 g/m2 consistent with concentric left ventricular hypertrophy. The anterior septum is mildly hypokinetic. Left ventricular systolic function appears low normal to mildly   depressed. Visually estimated ejection fraction is 50-55%. The LV Doppler derived stroke volume equals 37.0 ccs.     Diastolic indices: E/e' ratio(avg) 8. Diastolic function is normal.     Right Atrium: The right atrium is mildly enlarged, measuring 5.7 cm in length and 3.8 cm in width in the apical view.     Right Ventricle: The right ventricle is mildly enlarged in size. Global right ventricular systolic function appears normal. The estimated PA systolic pressure is 32 mmHg.     Aortic Valve:  The aortic valve is moderately sclerotic with mildly restricted leaflet mobility. The peak velocity obtained across the aortic valve is 1.51 m/s, which translates to a peak gradient of 9 mmHg. The mean gradient is 5 mmHg.     Mitral Valve:  The mitral valve is mildly sclerotic.     Tricuspid Valve:  The tricuspid valve is normal in structure.     Pulmonary Valve:  The pulmonic valve is normal in structure.     Pericardium: There is no evidence of pericardial effusion.      IVC: IVC is normal in size and collapses > 50% with a sniff, suggesting normal right atrial pressure of 3 mmHg.     Intracavitary: There is no evidence of intracavitary mass or thrombus.  There is no evidence of vegetation.     CONCLUSIONS     1 - Concentric hypertrophy.     2 - Low normal to mildly depressed left ventricular systolic function (EF 50-55%).     3 - Anteroseptal WMA.     4 - Severe aortic valve sclerosis.     5 - Right ventricular enlargement with normal systolic function.     6 - The estimated PA systolic pressure is 32 mmHg.     This document has been electronically    SIGNED BY: Zachary Pressley MD On: 01/08/2018 19:44    Assessment and Plan:     Problem List:    Active Diagnoses:    Diagnosis Date Noted POA    PRINCIPAL PROBLEM:  Cellulitis of right knee [L03.115] 01/07/2018 Yes    Heart failure, systolic and diastolic, chronic [I50.42] 11/17/2016 Yes    Atrial fibrillation [I48.91] 05/22/2013 Yes    Chronic anticoagulation [Z79.01] 08/28/2014 Not Applicable    Hypertension [I10] 11/17/2016 Yes    Hypercholesterolemia [E78.00] 11/17/2016 Yes      Problems Resolved During this Admission:    Diagnosis Date Noted Date Resolved POA     Assessment and Plan:    1. Heart Failure, Systolic & Diastolic, Chronic              8/1/2016: Echo: Normal left ventricular size with mild systolic dysfunction. EF 45%. Markedly dilated LA. Moderate aortic valve sclerosis - 1.6 m/s.   1/8/2018: Normal left ventricular size with low normal systolic dysfunction. EF 50-55%. Anteroseptal wall mildly hypokinetic. Mild LVH. Markedly dilated LA. Moderate aortic valve sclerosis - 1.5 m/s.               On losartan 100 mg Q24, metoprolol 25 Q24 and furosemide 40 mg Q24.              Off ramipril 10 mg Q24 due to dry cough.              Appears reasonably well compensated.   Previously seen edema of ankles well controlled with pressure stockings..                2. Atrial Fibrillation              2010: Diagnosed with permanent atrial fibrillation.              CHADS2=4 (CHAS2). [id number]=3 ([id number])              On warfarin.              On metoprolol 25 mg Q24 and digoxin 0.25 mg Q24.              Rate  appear well controlled.     3. Chronic Anticoagulation              2010: Diagnosed with permanent atrial fibrillation.              CHADS2=4 (CHAS2). [id number]=1 ([id number]).              On warfarin. Anticoagulation managed by Dr. Pressley.              No aspirin or NSAID.              No bleeding.              INR followed.              Discussed option of NOAC in detail in the past.     4. Carotid Artery Stenosis              2004: Right CEA.              8/26/2014: Carotid Duplex: DIANNA: Mild. LICA: Critical - 3.4 m/s.              9/11/2014[place] [facility] [place]: DIANNA: Mild. Left Vert: 90%. LICA: 95%.              9/2014: Left CEA.              12/1/2016: Carotid Duplex: Moderate plaquing.   12/13/2017: Carotid Duplex: Moderate plaquing.              No need for aspirin.              Plan next Carotid Duplex in 12/2018 and yearly.     5. History of Cerebrovascular Accident              5/2004: CVA. Left sided hemiplegia. Received tPA. Good recovery.              7/2004: Left temporal CVA. Weak.     6. Peripheral Artery Disease              10/25/2016: Arterial Duplex: Right: SFA: Distal 1.8 m/s. Left: No obstructive disease above knee.              Asymptomatic.                7. Hypertension              1990: Diagnosed.               On losartan 100 mg Q24, metoprolol 25 Q24, clonidine 0.1 mg Q8, nifedipine 30 mg Q12 and furosemide 40 mg Q24.              Keeping log at home              Well controlled.                            8. Hypercholesterolemia              2004: Began statin.              On rosuvastatin 40 mg Q24.              Tells me well controlled.    9. Knee Pain   1/5/2018: Right knee pain became severe.   Evaluation in progress.     10. Primary Care              Dr. Dave Luna, Sr.       VTE Risk Mitigation         Ordered     Medium Risk of VTE  Once      01/08/18 0157     Place sequential compression device  Until discontinued      01/08/18 0157          Thank you for your consult.    I will  follow-up with patient. Please contact us if you have any additional questions.    Zachary Pressley MD  Cardiology   [facility]

## 2018-01-10 PROBLEM — R21 RASH OF BACK: Status: ACTIVE | Noted: 2018-01-10

## 2018-01-10 LAB
ANION GAP SERPL CALC-SCNC: 4 MMOL/L
BASOPHILS # BLD AUTO: 0.04 K/UL
BASOPHILS NFR BLD: 0.4 %
BUN SERPL-MCNC: 17 MG/DL
CALCIUM SERPL-MCNC: 8.4 MG/DL
CHLORIDE SERPL-SCNC: 101 MMOL/L
CO2 SERPL-SCNC: 30 MMOL/L
CREAT SERPL-MCNC: 1 MG/DL
DIFFERENTIAL METHOD: ABNORMAL
EOSINOPHIL # BLD AUTO: 0.4 K/UL
EOSINOPHIL NFR BLD: 4.3 %
ERYTHROCYTE [DISTWIDTH] IN BLOOD BY AUTOMATED COUNT: 14.8 %
EST. GFR  (AFRICAN AMERICAN): >60 ML/MIN/1.73 M^2
EST. GFR  (NON AFRICAN AMERICAN): >60 ML/MIN/1.73 M^2
GLUCOSE SERPL-MCNC: 100 MG/DL
HCT VFR BLD AUTO: 31.1 %
HGB BLD-MCNC: 10.3 G/DL
INR PPP: 1.4
LYMPHOCYTES # BLD AUTO: 1.4 K/UL
LYMPHOCYTES NFR BLD: 14.4 %
MAGNESIUM SERPL-MCNC: 2.1 MG/DL
MCH RBC QN AUTO: 28.5 PG
MCHC RBC AUTO-ENTMCNC: 33.1 G/DL
MCV RBC AUTO: 86 FL
MONOCYTES # BLD AUTO: 1 K/UL
MONOCYTES NFR BLD: 10.6 %
NEUTROPHILS # BLD AUTO: 6.7 K/UL
NEUTROPHILS NFR BLD: 69.3 %
PHOSPHATE SERPL-MCNC: 2.7 MG/DL
PLATELET # BLD AUTO: 247 K/UL
PMV BLD AUTO: 9.6 FL
POTASSIUM SERPL-SCNC: 3.7 MMOL/L
PROTHROMBIN TIME: 15.2 SEC
RBC # BLD AUTO: 3.61 M/UL
SODIUM SERPL-SCNC: 135 MMOL/L
WBC # BLD AUTO: 9.71 K/UL

## 2018-01-10 PROCEDURE — 11000001 HC ACUTE MED/SURG PRIVATE ROOM

## 2018-01-10 PROCEDURE — 63600175 PHARM REV CODE 636 W HCPCS: Performed by: NURSE PRACTITIONER

## 2018-01-10 PROCEDURE — G8979 MOBILITY GOAL STATUS: HCPCS | Mod: CJ

## 2018-01-10 PROCEDURE — G8978 MOBILITY CURRENT STATUS: HCPCS | Mod: CL

## 2018-01-10 PROCEDURE — 99204 OFFICE O/P NEW MOD 45 MIN: CPT | Mod: ,,, | Performed by: INTERNAL MEDICINE

## 2018-01-10 PROCEDURE — 25000003 PHARM REV CODE 250: Performed by: HOSPITALIST

## 2018-01-10 PROCEDURE — 80048 BASIC METABOLIC PNL TOTAL CA: CPT

## 2018-01-10 PROCEDURE — 99232 SBSQ HOSP IP/OBS MODERATE 35: CPT | Mod: ,,, | Performed by: HOSPITALIST

## 2018-01-10 PROCEDURE — 97161 PT EVAL LOW COMPLEX 20 MIN: CPT

## 2018-01-10 PROCEDURE — G0378 HOSPITAL OBSERVATION PER HR: HCPCS

## 2018-01-10 PROCEDURE — 63600175 PHARM REV CODE 636 W HCPCS: Performed by: HOSPITALIST

## 2018-01-10 PROCEDURE — 97530 THERAPEUTIC ACTIVITIES: CPT

## 2018-01-10 PROCEDURE — 25000003 PHARM REV CODE 250

## 2018-01-10 PROCEDURE — 63600175 PHARM REV CODE 636 W HCPCS: Performed by: EMERGENCY MEDICINE

## 2018-01-10 PROCEDURE — 85610 PROTHROMBIN TIME: CPT

## 2018-01-10 PROCEDURE — 83735 ASSAY OF MAGNESIUM: CPT

## 2018-01-10 PROCEDURE — 25000003 PHARM REV CODE 250: Performed by: NURSE PRACTITIONER

## 2018-01-10 PROCEDURE — 85025 COMPLETE CBC W/AUTO DIFF WBC: CPT

## 2018-01-10 PROCEDURE — 36415 COLL VENOUS BLD VENIPUNCTURE: CPT

## 2018-01-10 PROCEDURE — 84100 ASSAY OF PHOSPHORUS: CPT

## 2018-01-10 RX ORDER — PREDNISONE 10 MG/1
10 TABLET ORAL ONCE
Status: COMPLETED | OUTPATIENT
Start: 2018-01-10 | End: 2018-01-10

## 2018-01-10 RX ORDER — DIPHENHYDRAMINE HCL 25 MG
25 CAPSULE ORAL ONCE
Status: COMPLETED | OUTPATIENT
Start: 2018-01-10 | End: 2018-01-10

## 2018-01-10 RX ADMIN — CALCIUM CITRATE 200 MG (950 MG) TABLET 200 MG: at 10:01

## 2018-01-10 RX ADMIN — WARFARIN SODIUM 7.5 MG: 2.5 TABLET ORAL at 05:01

## 2018-01-10 RX ADMIN — CLONIDINE HYDROCHLORIDE 0.1 MG: 0.1 TABLET ORAL at 09:01

## 2018-01-10 RX ADMIN — CLONIDINE HYDROCHLORIDE 0.1 MG: 0.1 TABLET ORAL at 01:01

## 2018-01-10 RX ADMIN — VANCOMYCIN HYDROCHLORIDE 1250 MG: 10 INJECTION, POWDER, LYOPHILIZED, FOR SOLUTION INTRAVENOUS at 10:01

## 2018-01-10 RX ADMIN — ROSUVASTATIN CALCIUM 40 MG: 10 TABLET, FILM COATED ORAL at 10:01

## 2018-01-10 RX ADMIN — CLONIDINE HYDROCHLORIDE 0.1 MG: 0.1 TABLET ORAL at 05:01

## 2018-01-10 RX ADMIN — DIGOXIN 0.12 MG: 125 TABLET ORAL at 09:01

## 2018-01-10 RX ADMIN — ALLOPURINOL 100 MG: 100 TABLET ORAL at 10:01

## 2018-01-10 RX ADMIN — OXYCODONE HYDROCHLORIDE AND ACETAMINOPHEN 1 TABLET: 5; 325 TABLET ORAL at 10:01

## 2018-01-10 RX ADMIN — OXYCODONE HYDROCHLORIDE AND ACETAMINOPHEN 1 TABLET: 5; 325 TABLET ORAL at 05:01

## 2018-01-10 RX ADMIN — CEFTRIAXONE 1 G: 1 INJECTION, SOLUTION INTRAVENOUS at 12:01

## 2018-01-10 RX ADMIN — VITAMIN D, TAB 1000IU (100/BT) 1000 UNITS: 25 TAB at 10:01

## 2018-01-10 RX ADMIN — PREDNISONE 10 MG: 10 TABLET ORAL at 05:01

## 2018-01-10 RX ADMIN — FUROSEMIDE 20 MG: 20 TABLET ORAL at 10:01

## 2018-01-10 RX ADMIN — DIPHENHYDRAMINE HYDROCHLORIDE 25 MG: 25 CAPSULE ORAL at 05:01

## 2018-01-10 RX ADMIN — OXYCODONE HYDROCHLORIDE AND ACETAMINOPHEN 1 TABLET: 5; 325 TABLET ORAL at 09:01

## 2018-01-10 RX ADMIN — FINASTERIDE 5 MG: 5 TABLET, FILM COATED ORAL at 10:01

## 2018-01-10 RX ADMIN — MORPHINE SULFATE 6 MG: 2 INJECTION, SOLUTION INTRAMUSCULAR; INTRAVENOUS at 12:01

## 2018-01-10 RX ADMIN — METOPROLOL SUCCINATE 25 MG: 25 TABLET, EXTENDED RELEASE ORAL at 10:01

## 2018-01-10 NOTE — SUBJECTIVE & OBJECTIVE
Past Medical History:   Diagnosis Date    A-fib     Carotid artery stenosis 8/28/2014 8/26/2014: Carotid duplex: DIANNA: mild. LICA: critical - 3.4 m/s.    CHF (congestive heart failure)     Chronic anticoagulation 8/28/2014    Heart failure, systolic and diastolic, chronic 11/17/2016 8/1/2016: Echo: Normal left ventricular size with mild systolic dysfunction. EF 45%. Markedly dilated LA. Moderate aortic valve sclerosis - 1.6 m/s.    Herniated disc     Hypercholesterolemia 11/17/2016 2004: Began statin.    Hypertension     Hypertension 11/17/2016 1990: Diagnosed.    Lipids abnormal     OA (osteoarthritis)     Osteopenia     Peripheral artery disease 12/1/2016    10/25/2016: Arterial Duplex: Right: SFA: Distal 1.8 m/s. Left: No obstructive disease above knee.    Stroke     2004       Past Surgical History:   Procedure Laterality Date    BLADDER TUMOR EXCISION      CAROTID ENDARTERECTOMY      Left    EYE SURGERY      bilateral kory    HERNIA REPAIR      knee scope Right     SKIN BIOPSY      ca    TONSILLECTOMY      tumors removed chest Bilateral        Review of patient's allergies indicates:  No Known Allergies    Medications:  Prescriptions Prior to Admission   Medication Sig    allopurinol (ZYLOPRIM) 100 MG tablet Take 100 mg by mouth once daily.    ascorbic acid (VITAMIN C) 100 MG tablet Take 100 mg by mouth once daily.    calcium citrate (CALCITRATE) 200 mg (950 mg) tablet Take 1 tablet by mouth once daily.    cloNIDine (CATAPRES) 0.1 MG tablet take one tablet by mouth every 8 hours at 7 am, 1 pm, and 9 pm    digoxin (LANOXIN) 125 mcg tablet TAKE 1 TABLET ONE TIME DAILY    finasteride (PROSCAR) 5 mg tablet Take 5 mg by mouth once daily.    fish oil-omega-3 fatty acids 300-1,000 mg capsule Take 2 g by mouth once daily.    furosemide (LASIX) 40 MG tablet Take 1 tablet (40 mg total) by mouth once daily.    gabapentin (NEURONTIN) 100 MG capsule Take 200 mg by mouth 3 (three)  times daily.    glucosamine-chondroitin 500-400 mg tablet Take 1 tablet by mouth 3 (three) times daily.    hydrocodone-acetaminophen 5-325mg (NORCO) 5-325 mg per tablet Take 1 tablet by mouth every 8 (eight) hours as needed for Pain.    losartan (COZAAR) 100 MG tablet Take 100 mg by mouth once daily.    metoprolol succinate (TOPROL-XL) 25 MG 24 hr tablet TAKE 1 TABLET ONE TIME DAILY    NIFEdipine (PROCARDIA-XL) 30 MG (OSM) 24 hr tablet Take 30 mg by mouth every morning.    NIFEdipine (PROCARDIA-XL) 60 MG (OSM) 24 hr tablet Take 60 mg by mouth every evening.    potassium chloride (MICRO-K) 10 MEQ CpSR Take 10 mEq by mouth once daily.     rosuvastatin (CRESTOR) 40 MG Tab Take 40 mg by mouth once daily.    vitamin D 1000 units Tab Take 185 mg by mouth once daily.    warfarin (COUMADIN) 5 MG tablet TAKE 1 TABLET DAILY. DOSE TO BE ADJUSTED ACCORDING TO INR. (Patient taking differently: TAKE 1 & 1/2 TABLETS ( 7.5 MG)  BY MOUTH DAILY ON TUESDAY, WEDNESDAY, SATURDAY, AND SUNDAY. TAKE ONE TABLET ( 5 MG) BY MOUTH ON MONDAY, THURSDAY, AND FRIDAY. DOSE TO BE ADJUSTED ACCORDING TO INR.)    co-enzyme Q-10 50 mg capsule Take 50 mg by mouth once daily.    influenza (FLUZONE HIGH-DOSE 2017-18, PF,) 180 mcg/0.5 mL vaccine Inject 0.5 mLs into the muscle once. a year    MULTIVITAMIN W-MINERALS/LUTEIN (CENTRUM SILVER ORAL) Take 1 tablet by mouth once daily.      Antibiotics     Start     Stop Route Frequency Ordered    01/09/18 0000  cefTRIAXone (ROCEPHIN) 1 g in dextrose 5 % 50 mL IVPB      -- IV Every 24 hours (non-standard times) 01/08/18 0212    01/08/18 2300  vancomycin (VANCOCIN) 1,250 mg in dextrose 5 % 250 mL IVPB  (Vancomycin IVPB with levels panel)      -- IV Daily 01/08/18 0212        Antifungals     None        Antivirals     None           Immunization History   Administered Date(s) Administered    Influenza - Trivalent (ADULT) 09/24/2015       Family History     None        Social History     Social History     Marital status:      Spouse name: N/A    Number of children: N/A    Years of education: N/A     Social History Main Topics    Smoking status: Never Smoker    Smokeless tobacco: Never Used    Alcohol use Yes      Comment: couple times yearly     Drug use: No    Sexual activity: Not Asked     Other Topics Concern    None     Social History Narrative    None     Review of Systems   Constitutional: Negative for chills.   Musculoskeletal: Positive for arthralgias and joint swelling.   All other systems reviewed and are negative.    Objective:     Vital Signs (Most Recent):  Temp: 98.7 °F (37.1 °C) (01/10/18 0727)  Pulse: 87 (01/10/18 0727)  Resp: 18 (01/10/18 0727)  BP: 138/80 (01/10/18 0727)  SpO2: 96 % (01/10/18 0727) Vital Signs (24h Range):  Temp:  [97.5 °F (36.4 °C)-98.7 °F (37.1 °C)] 98.7 °F (37.1 °C)  Pulse:  [74-94] 87  Resp:  [16-20] 18  SpO2:  [96 %-99 %] 96 %  BP: (134-154)/(72-83) 138/80     Weight: 80.5 kg (177 lb 6.4 oz)  Body mass index is 26.97 kg/m².    Estimated Creatinine Clearance: 58 mL/min (based on SCr of 1 mg/dL).    Physical Exam   Constitutional: He is oriented to person, place, and time. He appears well-developed and well-nourished. No distress.   HENT:   Head: Normocephalic and atraumatic.   Eyes: Pupils are equal, round, and reactive to light.   Musculoskeletal: He exhibits edema and tenderness.   No erythema   Neurological: He is alert and oriented to person, place, and time.   Skin: Skin is warm and dry. Capillary refill takes less than 2 seconds. He is not diaphoretic.   Bruising left thigh laterally   Psychiatric: He has a normal mood and affect. His behavior is normal. Judgment and thought content normal.   Nursing note and vitals reviewed.      Significant Labs:   CBC:   Recent Labs  Lab 01/10/18  0537   WBC 9.71   HGB 10.3*   HCT 31.1*        Wound Culture:   Recent Labs  Lab 01/08/18  1304   LABAERO No growth       Significant Imaging: I have reviewed all  pertinent imaging results/findings within the past 24 hours.

## 2018-01-10 NOTE — CONSULTS
Consult Note  Nephrology    Consult Requested By: Michael Boston MD  Reason for Consult: CKD HTN    SUBJECTIVE:     History of Present Illness:  Patient is a 77 y.o. male presents with worsening right knee pain. Pt had Rt Knee pain and underwent steroid injection by Dr. Diehl 12/21/17 with interval improvement, but pain slowly returned so that he presented for re-evaluation in outpt ortho clinic on 1/5/18 and had another steroid injection in same knee. This did not relieve pain, but rather over next few days pain escalated such that he could no longer walk or sleep and was advised to come to ED. Pt reports that at time of second injection there was not much fluid to be aspirated. Similarly upon aspiration here there was minimal fluid withdrawn. Empric Vanco and rocephin were started and blood cultures also obtained on 1/6/18 all of which are still NGTD. He denies any fever or chills or other signs of systemic illness. The pt feels that since admission the joint still is no better.    Past Medical History:   Diagnosis Date    A-fib     Carotid artery stenosis 8/28/2014 8/26/2014: Carotid duplex: DIANNA: mild. LICA: critical - 3.4 m/s.    CHF (congestive heart failure)     Chronic anticoagulation 8/28/2014    Heart failure, systolic and diastolic, chronic 11/17/2016 8/1/2016: Echo: Normal left ventricular size with mild systolic dysfunction. EF 45%. Markedly dilated LA. Moderate aortic valve sclerosis - 1.6 m/s.    Herniated disc     Hypercholesterolemia 11/17/2016 2004: Began statin.    Hypertension     Hypertension 11/17/2016 1990: Diagnosed.    Lipids abnormal     OA (osteoarthritis)     Osteopenia     Peripheral artery disease 12/1/2016    10/25/2016: Arterial Duplex: Right: SFA: Distal 1.8 m/s. Left: No obstructive disease above knee.    Stroke     2004     Past Surgical History:   Procedure Laterality Date    BLADDER TUMOR EXCISION      CAROTID ENDARTERECTOMY      Left    EYE SURGERY       bilateral kory    HERNIA REPAIR      knee scope Right     SKIN BIOPSY      ca    TONSILLECTOMY      tumors removed chest Bilateral      History reviewed. No pertinent family history.  Social History   Substance Use Topics    Smoking status: Never Smoker    Smokeless tobacco: Never Used    Alcohol use Yes      Comment: couple times yearly        Prescriptions Prior to Admission   Medication Sig Dispense Refill Last Dose    allopurinol (ZYLOPRIM) 100 MG tablet Take 100 mg by mouth once daily.   1/7/2018 at 8:00 am     ascorbic acid (VITAMIN C) 100 MG tablet Take 100 mg by mouth once daily.   1/7/2018 at 8:00 am     calcium citrate (CALCITRATE) 200 mg (950 mg) tablet Take 1 tablet by mouth once daily.   1/7/2018 at 8:00 am     cloNIDine (CATAPRES) 0.1 MG tablet take one tablet by mouth every 8 hours at 7 am, 1 pm, and 9 pm  1 1/7/2018 at 9:00 pm     digoxin (LANOXIN) 125 mcg tablet TAKE 1 TABLET ONE TIME DAILY 90 tablet 3 1/7/2018 at 8:00AM     finasteride (PROSCAR) 5 mg tablet Take 5 mg by mouth once daily.   1/7/2018 at 8:00 AM     fish oil-omega-3 fatty acids 300-1,000 mg capsule Take 2 g by mouth once daily.   1/7/2018 at 8:00 AM     furosemide (LASIX) 40 MG tablet Take 1 tablet (40 mg total) by mouth once daily. 30 tablet 11 1/7/2018 at 8:00 am     gabapentin (NEURONTIN) 100 MG capsule Take 200 mg by mouth 3 (three) times daily.   Past Week at Unknown time    glucosamine-chondroitin 500-400 mg tablet Take 1 tablet by mouth 3 (three) times daily.   1/7/2018 at 8:00 AM    hydrocodone-acetaminophen 5-325mg (NORCO) 5-325 mg per tablet Take 1 tablet by mouth every 8 (eight) hours as needed for Pain.   Past Week at Unknown time    losartan (COZAAR) 100 MG tablet Take 100 mg by mouth once daily.   1/7/2018 at 8:00 AM     metoprolol succinate (TOPROL-XL) 25 MG 24 hr tablet TAKE 1 TABLET ONE TIME DAILY 90 tablet 3 1/7/2018 at 9:00 AM     NIFEdipine (PROCARDIA-XL) 30 MG (OSM) 24 hr tablet Take 30 mg  by mouth every morning.   1/7/2018 at 8:00 AM     NIFEdipine (PROCARDIA-XL) 60 MG (OSM) 24 hr tablet Take 60 mg by mouth every evening.   1/6/2018 at 8:00 PM     potassium chloride (MICRO-K) 10 MEQ CpSR Take 10 mEq by mouth once daily.    1/7/2018 at 8:00 AM     rosuvastatin (CRESTOR) 40 MG Tab Take 40 mg by mouth once daily.   1/7/2018 at 7:00 PM     vitamin D 1000 units Tab Take 185 mg by mouth once daily.   1/7/2018 at 8:00 AM     warfarin (COUMADIN) 5 MG tablet TAKE 1 TABLET DAILY. DOSE TO BE ADJUSTED ACCORDING TO INR. (Patient taking differently: TAKE 1 & 1/2 TABLETS ( 7.5 MG)  BY MOUTH DAILY ON TUESDAY, WEDNESDAY, SATURDAY, AND SUNDAY. TAKE ONE TABLET ( 5 MG) BY MOUTH ON MONDAY, THURSDAY, AND FRIDAY. DOSE TO BE ADJUSTED ACCORDING TO INR.) 120 tablet 3 1/7/2018 at 5:00 PM    co-enzyme Q-10 50 mg capsule Take 50 mg by mouth once daily.   1/7/2018 at 8:00 AM     influenza (FLUZONE HIGH-DOSE 2017-18, PF,) 180 mcg/0.5 mL vaccine Inject 0.5 mLs into the muscle once. a year   11/30/2017    MULTIVITAMIN W-MINERALS/LUTEIN (CENTRUM SILVER ORAL) Take 1 tablet by mouth once daily.    1/7/2018 at 8:00 AM        Review of patient's allergies indicates:  No Known Allergies     Review of Systems:  Constitutional: No fever or chills, no weight changes.  Eyes: No visual changes or photophobia  HEENT: No nasal congestion or sore throat  Respiratory: No cough or shorness of breath  Cardiovascular: No chest pain or palpitations  Gastrointestinal: Good appetite, no nausea or vomiting, no change in bowel habits  Genitourinary: No hematuria or dysuria  Skin: No rash or pruritis  Hematologic/lymphatic: No easy bruising, bleeding or lymphadenopathy  Musculoskeletal:+ Rt knee pain/ swelling or myalgias  Neurological: No seizures or tremors  Endocrine: No heat/cold intolerance.  No polyuria/polydipsia.  Psychiatric:  No depression or anxiety.     OBJECTIVE:     Vital Signs (Most Recent)  Temp: 98.5 °F (36.9 °C) (01/09/18  1206)  Pulse: 74 (01/09/18 1206)  Resp: 18 (01/09/18 0745)  BP: 139/72 (01/09/18 1206)  SpO2: 98 % (01/09/18 1206)    Vital Signs Range (Last 24H):  Temp:  [97.4 °F (36.3 °C)-98.5 °F (36.9 °C)]   Pulse:  [74-93]   Resp:  [14-18]   BP: (129-146)/(65-82)   SpO2:  [97 %-98 %]     Physical Exam:    General appearance: Well developed, well nourished  Head: Normocephalic, atraumatic  Eyes:  Conjunctivae nl. Sclera anicteric. PERRL.  HEENT: Lips, mucosa, and tongue normal; teeth and gums normal and oropharynx clear.  Neck: Supple, trachea midline, thyroid not enlarged,   Lungs: Clear to auscultation bilaterally and normal respiratory effort  Heart: IrrIrr,  no murmur, click, rub or gallop  Abdomen: Soft, non-tender non-distended; bowel sounds normal; no masses,  no organomegaly  Extremities: No cyanosis or clubbing. Rt knee with +pain on PROM and + edema, but no warmth or erythema  Pulses: 2+ and symmetric  Skin: Skin color, texture, turgor normal. No rashes or lesions  Lymph nodes: Cervical, supraclavicular, and axillary nodes normal.  Neurologic: Normal strength and tone. No focal numbness or weakness  Psychiatric:  Alert and oriented times 3.  Affect appropriate.     Diagnostic Results:  Labs: Reviewed    ASSESSMENT/PLAN:     1. CKD stage 3a-Appears at baseline. Caution for Nephrotoxins  2. HTN-Continue home meds  3. PVD/ Hypercholersterolemia/ Hx CVA/ Bilat CEA-Continue home meds  4. A. Fib on CAC/ Chronic Combined CHF-Appears well compensated. Initial INR supratheraputic. Would recheck  5. Right Knee pain, Edema, Leukocytosis- WBCs elevated but ? Due to steroids and CRP markedly elevated. Dr. Diehl following and may consider MRI. Currently on Vanco and Rocephin.  Fluid aspirated from Knee NGTD. Id consult for opinion. There does not appear to be any evidence of cellulitis, hemarthrosis vs septic joint. Doubt gout since steroids did not improve this last time, although could send for fluid crystal analysis. Uric acid  level not necessarily helpful during a possible acute flare but can check. Await objective data and plan.  6. Osteopenia/ OA/ Hyperuricemia- prn pain meds, Continue allopurinol.  7. BPH-Continue Finestride    Thanks for consult will follow

## 2018-01-10 NOTE — ASSESSMENT & PLAN NOTE
- Cardiology following  - INR daily  - May present an issue with ortho suspicion of hemarthrosis.

## 2018-01-10 NOTE — ASSESSMENT & PLAN NOTE
- Right knee join with swelling and warmth. WBC elevated. Afebrile  - Vancomycin/Rocephin day 3.  - Right knee fluid culture pending  - Orthopedics consulted.  Discussed with Dr. Gonzalez.  Dr. Gonzalez feels patient may have a hemarthrosis as patient on coumadin with elevated INR, and that it will likely resorb.  Suggested imaging, possibly MRI or US.   - Plan to discussed with radiology to determine optimal imaging modality to characterize.   - ESR and CRP elevated  - Discussed with Dr. Cifuentes PCP.

## 2018-01-10 NOTE — PROGRESS NOTES
"[facility] Hospital Medicine  Progress Note    Patient Name: Alan Gutiérrez  MRN: [id number]  Patient Class: OP- Observation   Admission Date: 1/7/2018  Length of Stay: 0 days  Attending Physician: Michael Boston MD  Primary Care Provider: Dave Luna Sr, MD        Subjective:     Principal Problem:Cellulitis of right knee    HPI:  Patient is a 77 y.o. male who presents to the ED with complaint of right knee pain. He has had intermittent chronic right knee pain for over twenty years s/p arthroscopic knee surgery, and is followed by Dr. Gonzalez, orthopedic surgery. Wife reports onset of current episode of knee pain two weeks ago, and states "this is the worst it has ever been." Patient woke up unable to walk or bear weight secondary to pain two weeks ago. He was seen by Dr. Gonzalez at that time and reportedly had fluid drained and was given a cortisone shot with temporary improvement. 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 Gonzalez's PA two days ago and had fluid drained and was given cortisone injection again without relief. He was seen here yesterday for intractable pain, and had a dry tap. He reports temporary relief to pain with morphine yesterday. Patient reports worsening of pain since being discharged yesterday, and currently complains of pain and swelling of the knee. Pain radiates from the knee into the distal thigh and is worse with movement. He is unable to bear weight secondary to pain. He denies fever, nausea, or vomiting. He has an appointment with physical therapy in two days, and is "starting the process of having the knee replaced." He has no additional complaints.    Hospital Course:  No notes on file    Interval History:   Doing okay.   Discussed with Dr. Anders.       Review of Systems   Constitutional: Negative for appetite change and fever.   Respiratory: Negative for cough and " shortness of breath.    Cardiovascular: Negative for chest pain and palpitations.   Gastrointestinal: Negative for nausea and vomiting.   Musculoskeletal: Positive for joint swelling.   Skin: Negative for color change and rash.   Neurological: Negative for dizziness and weakness.   Psychiatric/Behavioral: Negative for agitation and confusion.     Objective:     Vital Signs (Most Recent):  Temp: 98.5 °F (36.9 °C) (01/10/18 1310)  Pulse: 74 (01/10/18 1310)  Resp: 18 (01/10/18 1310)  BP: 136/76 (01/10/18 1310)  SpO2: 97 % (01/10/18 1310) Vital Signs (24h Range):  Temp:  [97.5 °F (36.4 °C)-98.7 °F (37.1 °C)] 98.5 °F (36.9 °C)  Pulse:  [74-94] 74  Resp:  [16-20] 18  SpO2:  [96 %-99 %] 97 %  BP: (134-154)/(76-83) 136/76     Weight: 80.5 kg (177 lb 6.4 oz)  Body mass index is 26.97 kg/m².    Intake/Output Summary (Last 24 hours) at 01/10/18 1542  Last data filed at 01/10/18 0500   Gross per 24 hour   Intake              900 ml   Output              500 ml   Net              400 ml      Physical Exam   Constitutional: He is oriented to person, place, and time. He appears well-developed and well-nourished.   HENT:   Head: Normocephalic and atraumatic.   Eyes: Conjunctivae are normal. Pupils are equal, round, and reactive to light.   Neck: Normal range of motion. Neck supple.   Cardiovascular: Normal rate and regular rhythm.    Irregularly irregular.   Pulmonary/Chest: Effort normal and breath sounds normal.   Abdominal: Soft.   Musculoskeletal: He exhibits edema and tenderness.   Painful limited ROM with passive motion; swelling and warmth.  Skin does not appear cellulitic currently.    Neurological: He is alert and oriented to person, place, and time.   Skin: Skin is warm and dry.       Significant Labs:   BMP:   Recent Labs  Lab 01/10/18  0537      *   K 3.7      CO2 30*   BUN 17   CREATININE 1.0   CALCIUM 8.4*   MG 2.1     CBC:   Recent Labs  Lab 01/10/18  0537   WBC 9.71   HGB 10.3*   HCT 31.1*   PLT  247       Significant Imaging: I have reviewed all pertinent imaging results/findings within the past 24 hours.   Imaging Results    None       Assessment/Plan:      * Cellulitis of right knee    - Right knee join with swelling and warmth. WBC elevated. Afebrile  - Vancomycin/Rocephin day 3.  - Right knee fluid culture pending  - Orthopedics consulted.  Discussed with Dr. Gonzalez.  Dr. Gonzalez feels patient may have a hemarthrosis as patient on coumadin with elevated INR, and that it will likely resorb.  Suggested imaging, possibly MRI or US.   - Plan to discussed with radiology to determine optimal imaging modality to characterize.   - ESR and CRP elevated  - Discussed with Dr. Cifuentes PCP.             Leukocytosis    - Monitor  - Afebrile  - Blood culture 1/7/18 no growth so far.   - ID noted:     - resolved  - doubt septic arthritis  - seems more likely a resolving hemarthrosis  - culture is pending  - if sterile, consider changing to po doxycycline for prophylaxis until blood is reabsorbed   - Discussed with Dr. Anders and if culture remains no growth would prescribe doxycycline for 2 week limit with prompt orthopedic follow up, but noting that recommendation is for doxycycline prophylaxis until blood is reabsorbed.          Heart failure, systolic and diastolic, chronic    - Appears stable currently  - Dr. Pressley following.                Hypercholesterolemia    - Continue Crestor          Hypertension    - Currently normotensive  - Continue clonidine, lasix, toprol          Chronic anticoagulation    - Cardiology following  - INR daily  - Will restart Coumadin.            Atrial fibrillation    - Cardiology noted:   2010: Diagnosed with permanent atrial fibrillation.              CHADS2=4 (CHAS2). [id number]=5 ([id number])              On warfarin.              On metoprolol 25 mg Q24 and digoxin 0.25 mg Q24.              Rate appear well controlled.              VTE Risk Mitigation         Ordered      warfarin tablet 7.5 mg  Daily     Route:  Oral        01/10/18 1214     Medium Risk of VTE  Once      01/08/18 0157     Place sequential compression device  Until discontinued      01/08/18 0157              Michael Boston MD  Department of Hospital Medicine   [facility]

## 2018-01-10 NOTE — PROGRESS NOTES
"Nephrology  Progress Note    Admit Date: 1/7/2018   LOS: 0 days     SUBJECTIVE:     Follow-up For:  Right Knee Pain    Interval History:     Still with knee pain.  Discussed with team.  No CP/SOB.     Review of Systems:  Constitutional: No fever or chills  Respiratory: No cough or shortness of breath  Cardiovascular: No chest pain or palpitations  Gastrointestinal: No nausea or vomiting  Neurological: No confusion or weakness    OBJECTIVE:     Vital Signs Range (Last 24H):  /80 (Patient Position: Lying)   Pulse 87   Temp 98.7 °F (37.1 °C) (Oral)   Resp 18   Ht 5' 8" (1.727 m)   Wt 80.5 kg (177 lb 6.4 oz)   SpO2 96%   BMI 26.97 kg/m²     Temp:  [97.5 °F (36.4 °C)-98.7 °F (37.1 °C)]   Pulse:  [77-94]   Resp:  [16-20]   BP: (134-154)/(80-83)   SpO2:  [96 %-99 %]     I & O (Last 24H):  Intake/Output Summary (Last 24 hours) at 01/10/18 1307  Last data filed at 01/10/18 0500   Gross per 24 hour   Intake              900 ml   Output              500 ml   Net              400 ml       Physical Exam:  General appearance: Well developed, well nourished  Eyes:  Conjunctivae/corneas clear. PERRL.  Lungs: Normal respiratory effort,   clear to auscultation bilaterally   Heart: Irr/Regular rate and rhythm, S1, S2 normal, no murmur, rub or virgilio.  Abdomen: Soft, non-tender non-distended; bowel sounds normal; no masses,  no organomegaly  Extremities: No cyanosis or clubbing. No edema.  Right knee with polar ice.  Still decrease ROM   Skin: Skin color, texture, turgor normal. No rashes or lesions  Neurologic: Normal strength and tone. No focal numbness or weakness     Laboratory Data:    Recent Labs  Lab 01/10/18  0537   WBC 9.71   RBC 3.61*   HGB 10.3*   HCT 31.1*      MCV 86   MCH 28.5   MCHC 33.1       BMP:   Recent Labs  Lab 01/10/18  0537      *   K 3.7      CO2 30*   BUN 17   CREATININE 1.0   CALCIUM 8.4*   MG 2.1     Lab Results   Component Value Date    CALCIUM 8.4 (L) 01/10/2018    " PHOS 2.7 01/10/2018               Medications:  Medication list was reviewed and changes noted under Assessment/Plan.    Diagnostic Results:        ASSESSMENT/PLAN:     1. CKD stage 3a-Appears at baseline.Renally dose meds, avoid nephrotoxins, and monitor I/O's closely.  2. HTN-Continue home meds  3. PVD/ Hypercholersterolemia/ Hx CVA/ Bilat CEA-Continue home meds  4. A. Fib on CAC/ Chronic Combined CHF-Appears well compensated. Initial INR supratheraputic and now wnl.   5. Right Knee pain, Edema, Leukocytosis- slowly improving.  Defer to ortho/ID.  6. Osteopenia/ OA/ Hyperuricemia- prn pain meds, Continue allopurinol.  7. BPH-Continue Finestride      Will follow from afar  Call with questions.

## 2018-01-10 NOTE — CONSULTS
DATE OF CONSULTATION:  01/08/2018.    DIAGNOSIS:  Possible septic arthritis of the right knee.    HISTORY OF PRESENT ILLNESS:  Alan Gutiérrez is a 77-year-old gentleman who was   seen in the ER with chronic right knee pain.  He had been seen in the office by   myself and my nurse practitioner for advanced osteoarthritis.  He has had   cortisone injections off and on.  My nurse practitioner had seen him a few days   prior, but could not aspirate anything from the knee except the small amount of   blood.  He was unable to bear weight secondary to pain and was admitted.  There   was mild fever noted.  He is on Coumadin for atrial fibrillation and his INR   last week was over 4.    PAST MEDICAL HISTORY:  Significant for atrial fibrillation, carotid artery   stenosis, heart failure, hypercholesterolemia, hypertension, peripheral artery   disease.    ALLERGIES:  He has no known drug allergies.    PRESENT MEDICATIONS:  Include allopurinol, clonidine, Lanoxin, Proscar, Lasix,   Toprol-XL, Percocet, Prevnar, Crestor, and Coumadin 5 mg a day.    PHYSICAL EXAMINATION:  GENERAL:  He is an elderly male in no acute distress.  EXTREMITIES:  Exam of the right knee demonstrates an effusion/synovitis.  There   is no evidence of cellulitis or infection.  There is no erythema and no   increased warmth.  There is pain with range of motion secondary to his   arthritis.  Under sterile technique, an attempted aspiration was performed and   only a few drops of blood were obtained.  It appears that he has a hematoma   probably secondary to his Coumadin.  This blood was sent for culture.    IMPRESSION:  Hemarthrosis/hematoma, right knee.    PLAN:  We will await the cultures, but I do not see any evidence of septic   arthritis.  Imaging can be performed including an ultrasound or MRI to confirm   what appears to be a hematoma.  It is not to such a large degree where he would   need an arthrotomy for evacuation since he is still on Coumadin at  the present   time.      CONCHIS/WALDEMAR  dd: 01/09/2018 08:49:28 (CST)  td: 01/09/2018 18:56:09 (CST)  Doc ID   #[id number]  Job ID #[id number]    CC:

## 2018-01-10 NOTE — HPI
Patient is a 77 y.o. male presents with worsening right knee pain. Pt had Rt Knee pain and underwent steroid injection by Dr. Diehl 12/21/17 with interval improvement, but pain slowly returned so that he presented for re-evaluation in outpt ortho clinic on 1/5/18 and had another steroid injection in same knee. This did not relieve pain, but rather over next few days pain escalated such that he could no longer walk or sleep and was advised to come to ED. The patient was seen by Dr. Gonzalez and an aspiration was performed. Culture is NGTD. I am consulted for abx recommendations.

## 2018-01-10 NOTE — ASSESSMENT & PLAN NOTE
- resolved  - doubt septic arthritis  - seems more likely a resolving hemarthrosis  - culture is pending  - if sterile, consider changing to po doxycycline for prophylaxis until blood is reabsorbed

## 2018-01-10 NOTE — SUBJECTIVE & OBJECTIVE
Interval History:   Doing okay.   Discussed with Dr. Anders.       Review of Systems   Constitutional: Negative for appetite change and fever.   Respiratory: Negative for cough and shortness of breath.    Cardiovascular: Negative for chest pain and palpitations.   Gastrointestinal: Negative for nausea and vomiting.   Musculoskeletal: Positive for joint swelling.   Skin: Negative for color change and rash.   Neurological: Negative for dizziness and weakness.   Psychiatric/Behavioral: Negative for agitation and confusion.     Objective:     Vital Signs (Most Recent):  Temp: 98.5 °F (36.9 °C) (01/10/18 1310)  Pulse: 74 (01/10/18 1310)  Resp: 18 (01/10/18 1310)  BP: 136/76 (01/10/18 1310)  SpO2: 97 % (01/10/18 1310) Vital Signs (24h Range):  Temp:  [97.5 °F (36.4 °C)-98.7 °F (37.1 °C)] 98.5 °F (36.9 °C)  Pulse:  [74-94] 74  Resp:  [16-20] 18  SpO2:  [96 %-99 %] 97 %  BP: (134-154)/(76-83) 136/76     Weight: 80.5 kg (177 lb 6.4 oz)  Body mass index is 26.97 kg/m².    Intake/Output Summary (Last 24 hours) at 01/10/18 1542  Last data filed at 01/10/18 0500   Gross per 24 hour   Intake              900 ml   Output              500 ml   Net              400 ml      Physical Exam   Constitutional: He is oriented to person, place, and time. He appears well-developed and well-nourished.   HENT:   Head: Normocephalic and atraumatic.   Eyes: Conjunctivae are normal. Pupils are equal, round, and reactive to light.   Neck: Normal range of motion. Neck supple.   Cardiovascular: Normal rate and regular rhythm.    Irregularly irregular.   Pulmonary/Chest: Effort normal and breath sounds normal.   Abdominal: Soft.   Musculoskeletal: He exhibits edema and tenderness.   Painful limited ROM with passive motion; swelling and warmth.  Skin does not appear cellulitic currently.    Neurological: He is alert and oriented to person, place, and time.   Skin: Skin is warm and dry.       Significant Labs:   BMP:   Recent Labs  Lab 01/10/18  0561       *   K 3.7      CO2 30*   BUN 17   CREATININE 1.0   CALCIUM 8.4*   MG 2.1     CBC:   Recent Labs  Lab 01/10/18  0537   WBC 9.71   HGB 10.3*   HCT 31.1*          Significant Imaging: I have reviewed all pertinent imaging results/findings within the past 24 hours.   Imaging Results    None

## 2018-01-10 NOTE — PROGRESS NOTES
"[facility] Medicine  Progress Note    Patient Name: Alan Gutiérrez  MRN: [id number]  Patient Class: OP- Observation   Admission Date: 1/7/2018  Length of Stay: 0 days  Attending Physician: Michael Boston MD  Primary Care Provider: Dave Luna Sr, MD        Subjective:     Principal Problem:Cellulitis of right knee    HPI:  Patient is a 77 y.o. male who presents to the ED with complaint of right knee pain. He has had intermittent chronic right knee pain for over twenty years s/p arthroscopic knee surgery, and is followed by Dr. Gonzalez, orthopedic surgery. Wife reports onset of current episode of knee pain two weeks ago, and states "this is the worst it has ever been." Patient woke up unable to walk or bear weight secondary to pain two weeks ago. He was seen by Dr. Gonzalez at that time and reportedly had fluid drained and was given a cortisone shot with temporary improvement. 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 Gonzalez's PA two days ago and had fluid drained and was given cortisone injection again without relief. He was seen here yesterday for intractable pain, and had a dry tap. He reports temporary relief to pain with morphine yesterday. Patient reports worsening of pain since being discharged yesterday, and currently complains of pain and swelling of the knee. Pain radiates from the knee into the distal thigh and is worse with movement. He is unable to bear weight secondary to pain. He denies fever, nausea, or vomiting. He has an appointment with physical therapy in two days, and is "starting the process of having the knee replaced." He has no additional complaints.    Hospital Course:  No notes on file    Interval History:   Pt new to me.  Doing okay.  Has pain swelling to right knee.  History of recent fluid aspirations and steroid injections.      Review of Systems   Constitutional: " Negative for appetite change and fever.   Respiratory: Negative for cough and shortness of breath.    Cardiovascular: Negative for chest pain and palpitations.   Gastrointestinal: Negative for nausea and vomiting.   Musculoskeletal: Positive for joint swelling.   Skin: Negative for color change and rash.   Neurological: Negative for dizziness and weakness.   Psychiatric/Behavioral: Negative for agitation and confusion.     Objective:     Vital Signs (Most Recent):  Temp: 98.5 °F (36.9 °C) (01/09/18 1206)  Pulse: 74 (01/09/18 1206)  Resp: 18 (01/09/18 0745)  BP: 139/72 (01/09/18 1206)  SpO2: 98 % (01/09/18 1206) Vital Signs (24h Range):  Temp:  [97.4 °F (36.3 °C)-98.5 °F (36.9 °C)] 98.5 °F (36.9 °C)  Pulse:  [74-93] 74  Resp:  [14-18] 18  SpO2:  [97 %-98 %] 98 %  BP: (129-146)/(65-82) 139/72     Weight: 80.5 kg (177 lb 6.4 oz)  Body mass index is 26.97 kg/m².    Intake/Output Summary (Last 24 hours) at 01/09/18 1847  Last data filed at 01/09/18 0620   Gross per 24 hour   Intake              360 ml   Output              900 ml   Net             -540 ml      Physical Exam   Constitutional: He is oriented to person, place, and time. He appears well-developed and well-nourished.   HENT:   Head: Normocephalic and atraumatic.   Eyes: Conjunctivae are normal. Pupils are equal, round, and reactive to light.   Neck: Normal range of motion. Neck supple.   Cardiovascular: Normal rate.    Irregularly irregular.   Pulmonary/Chest: Effort normal and breath sounds normal.   Abdominal: Soft.   Musculoskeletal: He exhibits edema and tenderness.   Painful limited ROM with passive motion; swelling and warmth.  Skin does not appear cellulitic currently.    Neurological: He is alert and oriented to person, place, and time.   Skin: Skin is warm and dry.       Significant Labs:   CBC:   Recent Labs  Lab 01/07/18  2130   WBC 16.64*   HGB 12.3*   HCT 36.5*   *     CMP:   Recent Labs  Lab 01/07/18  2130   *   K 3.9   CL 95   CO2  27   *   BUN 22   CREATININE 1.1   CALCIUM 9.4   ANIONGAP 10   EGFRNONAA >60     Coagulation: No results for input(s): PT, INR, APTT in the last 48 hours.    Significant Imaging: I have reviewed all pertinent imaging results/findings within the past 24 hours.   Imaging Results    None       Assessment/Plan:      * Cellulitis of right knee    - Right knee join with swelling and warmth. WBC elevated. Afebrile  - Vancomycin/Rocephin day 3.  - Right knee fluid culture pending  - Orthopedics consulted.  Discussed with Dr. Gonzalez.  Dr. Gonzalez feels patient may have a hemarthrosis as patient on coumadin with elevated INR, and that it will likely resorb.  Suggested imaging, possibly MRI or US.   - Plan to discussed with radiology to determine optimal imaging modality to characterize.   - ESR and CRP elevated  - Discussed with Dr. Cifuentes PCP.             Leukocytosis    - Monitor  - Afebrile  - Blood culture 1/7/18 no growth so far.             Heart failure, systolic and diastolic, chronic    - Appears stable currently  - Dr. Pressley following.                Hypercholesterolemia    - Continue Crestor          Hypertension    - Currently normotensive  - Continue clonidine, lasix, toprol          Chronic anticoagulation    - Cardiology following  - INR daily  - May present an issue with ortho suspicion of hemarthrosis.            Atrial fibrillation    - Cardiology noted:   2010: Diagnosed with permanent atrial fibrillation.              CHADS2=4 (CHAS2). [id number]=4 ([id number])              On warfarin.              On metoprolol 25 mg Q24 and digoxin 0.25 mg Q24.              Rate appear well controlled.              VTE Risk Mitigation         Ordered     Medium Risk of VTE  Once      01/08/18 0157     Place sequential compression device  Until discontinued      01/08/18 0157              Michael Boston MD  Department of Hospital Medicine   [facility]

## 2018-01-10 NOTE — PLAN OF CARE
Problem: Physical Therapy Goal  Goal: Physical Therapy Goal  Goals to be met by: 18     Patient will increase functional independence with mobility by performin. Sit<>stand transfer with supervision using rolling walker.   2. Gait > 15 feet with SBA using rolling walker.   3. Ascend/descend curb step with CGA with rolling walker.    Outcome: Ongoing (interventions implemented as appropriate)  PT evaluation completed. Pt requires min A for sit<>stand with rolling walker and cues for technique. He was able to tolerate small steps at bedside, however overall mobility tolerance and quality  is limited by c/o severe pain (9/10 R knee with activity). Recommend focus on improved pain control. Will continue to follow and progress as tolerated. Please see progress note for detailed plan of care and recommendations ( PT/OT, BSC).

## 2018-01-10 NOTE — PT/OT/SLP EVAL
"Physical Therapy Evaluation and Treatment    Patient Name:  Alan Gutiérrez   MRN:  [id number]    Recommendations:     Discharge Recommendations:  home with home health, home health PT, home health OT   Discharge Equipment Recommendations: bedside commode (Spouse is considering private purchase of gait belt)   Barriers to discharge: None    Assessment:     Alan Gutiérrez is a 77 y.o. male admitted with a medical diagnosis of Cellulitis of right knee.  He presents with the following impairments/functional limitations:  pain, decreased ROM, weakness, impaired functional mobilty, impaired self care skills. PT evaluation completed. Pt requires min A for sit<>stand with rolling walker and cues for technique. He was able to tolerate small steps at bedside, however overall mobility tolerance and quality  is limited by c/o severe pain (9/10 R knee with activity). Recommend focus on improved pain control. Will continue to follow and progress as tolerated.    Rehab Prognosis:  Fair, however limited by pain; patient would benefit from acute skilled PT services to address these deficits and reach maximum level of function.      Recent Surgery: * No surgery found *      Plan:     During this hospitalization, patient to be seen 5 x/week to address the above listed problems via gait training, therapeutic activities, therapeutic exercises, neuromuscular re-education, wheelchair management/training  · Plan of Care Expires:  02/09/18   Plan of Care Reviewed with: patient    Subjective     Communicated with nurse prior to session.  Patient found supine in bed asleep with spouse present upon PT entry to room, agreeable to evaluation.      Chief Complaint: pain  Patient comments/goals: "I haven't been sleeping in my bed. I sleep in my recliner." Spouse stated, "I would like to get a belt like that. We went through this with my mom." (re: gait belt)  Pain/Comfort:  · Pain Rating 1: 9/10 (with activity)  · Location - Side 1: " "Right  · Location 1: knee  · Pain Addressed 1: Reposition, Cessation of Activity, Nurse notified  · Pain Rating Post-Intervention 1: 8/10 (at rest)    Patients cultural, spiritual, Moravian conflicts given the current situation: none specified    Living Environment:  Per pt and spouse: Pt lives with spouse in 2 story house with 4" step to enter. He has a bedroom and bathroom with shower stall and shower chair on wheels as well as standard height toilet with grab bar on the first floor and does not need to go to second story.   Prior to admission, patients level of function was modified independent earlier this year up until "the holidays" when pt's knee became more painful. Recently he has still been ambulating with a rolling walker, sits to shower on recently purchased shower chair, and uses the garb bar for toilet transfers. Since the holidays he has been unable to drive or grocery shop. His spouse purchased a transport w/c that they used to come to the hospital.  Patient has the following equipment: shower chair, grab bar, wheelchair, walker, rolling (transport w/c).  DME owned (not currently used): none.  Upon discharge, patient will have assistance from spouse.    Objective:     Patient found with: cryotherapy     General Precautions: Standard,  (fall risk; ambulate with assist)   Orthopedic Precautions:N/A   Braces: N/A     Exams:  · Cognition: Patient is oriented to Person, Place, Time and Situation and follows approximately 100% of one step commands. Pt slightly drowsy during exam, spouse reports he recently had morphine.    · Posture:    · -       Rounded shoulders  · -       Forward head  · -       Abnormal trunk flexion  · -       Kyphosis (severe)  · Sensation: Intact to light touch bilateral LEs with eyes closed  · Skin Integrity: pink R anterior knee  · Edema: Moderate R knee  · Coordination: No coordination impairments identified with functional mobility. No formal testing performed.   · LE " ROM/Strength: Painful A/PROM R knee. PROM knee flexion approximately 0>60 degrees. Ankle dorsiflexion 5/5 bilaterally. Good quality L SLR, however unable to perform R SLR with pain.   · Tone: No tone impairments identified during functional mobility.   · Vital signs: SpO2: 100% on room air; Heart rate 69 bpm      Functional Mobility:  · Bed Mobility:     · Supine to Sit: moderate assistance and HOB slightly elevated, assist at R LE and trunk  · Sit to Supine: moderate assistance and bed flat, assist at bilateral LEs for pain management  · Transfers:     · Sit to Stand:  minimum assistance with rolling walker and x 2 trials from bed in low position and bed in elevated position with verbal cues for technique both trials  · Gait: 2 trials with lateral steps at bedside only limited by pain, no foot clearance bilaterally, decreased step length, verbal cues for TTWB for pain management. Significantly forward flexed posture noted, which pt and spouse report are baseline  · Balance: supervision at edge of bed in sitting static and dynamic; CGA in standing with bilateral UE support static and dynamic    AM-PAC 6 CLICK MOBILITY  Total Score:13       Therapeutic Activities and Exercises:   Discussed PT plan of care, safety with OOB mobility, use of walker, fit of walker, transfer technique, options for altering weight bearing, pain management techniques (cryotherapy, elevation, gentle ROM), options DME and PT services for discharge home      Patient left supine with all lines intact, call button in reach, bed alarm on, nurse notified and spouse present.    GOALS:    Physical Therapy Goals        Problem: Physical Therapy Goal    Goal Priority Disciplines Outcome Goal Variances Interventions   Physical Therapy Goal     PT/OT, PT Ongoing (interventions implemented as appropriate)     Description:  Goals to be met by: 18     Patient will increase functional independence with mobility by performin. Sit<>stand transfer  with supervision using rolling walker.   2. Gait > 15 feet with SBA using rolling walker.   3. Ascend/descend curb step with CGA with rolling walker.                      History:     Past Medical History:   Diagnosis Date    A-fib     Carotid artery stenosis 8/28/2014 8/26/2014: Carotid duplex: DIANNA: mild. LICA: critical - 3.4 m/s.    CHF (congestive heart failure)     Chronic anticoagulation 8/28/2014    Heart failure, systolic and diastolic, chronic 11/17/2016 8/1/2016: Echo: Normal left ventricular size with mild systolic dysfunction. EF 45%. Markedly dilated LA. Moderate aortic valve sclerosis - 1.6 m/s.    Herniated disc     Hypercholesterolemia 11/17/2016 2004: Began statin.    Hypertension     Hypertension 11/17/2016 1990: Diagnosed.    Lipids abnormal     OA (osteoarthritis)     Osteopenia     Peripheral artery disease 12/1/2016    10/25/2016: Arterial Duplex: Right: SFA: Distal 1.8 m/s. Left: No obstructive disease above knee.    Stroke     2004       Past Surgical History:   Procedure Laterality Date    BLADDER TUMOR EXCISION      CAROTID ENDARTERECTOMY      Left    EYE SURGERY      bilateral kory    HERNIA REPAIR      knee scope Right     SKIN BIOPSY      ca    TONSILLECTOMY      tumors removed chest Bilateral        Clinical Decision Making:     History  Co-morbidities and personal factors that may impact the plan of care Examination  Body Structures and Functions, activity limitations and participation restrictions that may impact the plan of care Clinical Presentation   Decision Making/ Complexity Score   Co-morbidities:   [] Time since onset of injury / illness / exacerbation  [] Status of current condition  []Patient's cognitive status and safety concerns    [] Multiple Medical Problems (see med hx)  Personal Factors:   [] Patient's age  [] Prior Level of function   [] Patient's home situation (environment and family support)  [] Patient's level of motivation  []  Expected progression of patient      HISTORY:(criteria)    [] 15093 - no personal factors/history    [] 46795 - has 1-2 personal factor/comorbidity     [] 55135 - has >3 personal factor/comorbidity     Body Regions:  [] Objective examination findings  [] Head     []  Neck  [] Trunk   [] Upper Extremity  [] Lower Extremity    Body Systems:  [] For communication ability, affect, cognition, language, and learning style: the assessment of the ability to make needs known, consciousness, orientation (person, place, and time), expected emotional /behavioral responses, and learning preferences (eg, learning barriers, education  needs)  [] For the neuromuscular system: a general assessment of gross coordinated movement (eg, balance, gait, locomotion, transfers, and transitions) and motor function  (motor control and motor learning)  [] For the musculoskeletal system: the assessment of gross symmetry, gross range of motion, gross strength, height, and weight  [] For the integumentary system: the assessment of pliability(texture), presence of scar formation, skin color, and skin integrity  [] For cardiovascular/pulmonary system: the assessment of heart rate, respiratory rate, blood pressure, and edema     Activity limitations:    [] Patient's cognitive status and saf ety concerns          [] Status of current condition      [] Weight bearing restriction  [] Cardiopulmunary Restriction    Participation Restrictions:   [] Goals and goal agreement with the patient     [] Rehab potential (prognosis) and probable outcome      Examination of Body System: (criteria)    [] 63743 - addressing 1-2 elements    [] 31924 - addressing a total of 3 or more elements     [] 55591 -  Addressing a total of 4 or more elements         Clinical Presentation: (criteria)  Choose one     On examination of body system using standardized tests and measures patient presents with (CHOOSE ONE) elements from any of the following: body structures and  functions, activity limitations, and/or participation restrictions.  Leading to a clinical presentation that is considered (CHOOSE ONE)                              Clinical Decision Making  (Eval Complexity):  Choose One     Time Tracking:     PT Received On: 01/10/18  PT Start Time: 1558     PT Stop Time: 1642  PT Total Time (min): 44 min     Billable Minutes: Evaluation 18 and Therapeutic Activity 25      Haydee Calderon, PT  01/10/2018

## 2018-01-10 NOTE — ASSESSMENT & PLAN NOTE
- Cardiology noted:   2010: Diagnosed with permanent atrial fibrillation.              CHADS2=4 (CHAS2). [id number]=5 ([id number])              On warfarin.              On metoprolol 25 mg Q24 and digoxin 0.25 mg Q24.              Rate appear well controlled.

## 2018-01-10 NOTE — ASSESSMENT & PLAN NOTE
- Monitor  - Afebrile  - Blood culture 1/7/18 no growth so far.   - ID noted:     - resolved  - doubt septic arthritis  - seems more likely a resolving hemarthrosis  - culture is pending  - if sterile, consider changing to po doxycycline for prophylaxis until blood is reabsorbed   - Discussed with Dr. Anders and if culture remains no growth would prescribe doxycycline for 2 week limit with prompt orthopedic follow up, but noting that recommendation is for doxycycline prophylaxis until blood is reabsorbed.

## 2018-01-10 NOTE — PROGRESS NOTES
[facility]  Cardiology  Progress Note    Patient Name: Alan Gutiérrez  MRN: [id number]  Admission Date: 1/7/2018  Hospital Length of Stay: 0 days  Code Status: Full Code   Attending Physician: Michael Boston MD   Primary Care Physician: Dave Luna Sr, MD  Expected Discharge Date:   Principal Problem:Cellulitis of right knee    Subjective:     Brief HPI:    Alan Gutiérrez is a 76 y.o. male with hypertension and hypercholesterolemia. He is overweight. He has a history of a cerebrovascular accident in 2004 and then had right carotid endarterectomy. He was diagnosed with atrial fibrillation in 2010. He has CHADS2 score of 5 (CHAS2) and a [id number] score of 7 ([id number]) being on warfarin. He has well compensated systolic as well as diastolic heart failure. He underwent left CEA on 9/12/2014. He has received DRAKE injection which has helped his back pain. He has been bothered by swelling of especially the left ankle which has improved after he began wrapping the legs. He denies any chest pain or dyspnea. No palpitations or weak spells. No bleeding.     Now presents with pain and swelling of his right knee.    Hospital Course:    Antibiotics iv.    Interval History:     Still with pain in right knee.    Review of Systems   Cardiovascular: Negative for chest pain.   Respiratory: Negative for hemoptysis and shortness of breath.      Objective:     Vital Signs (Most Recent):  Temp: 98.4 °F (36.9 °C) (01/09/18 1920)  Pulse: 77 (01/09/18 1920)  Resp: 16 (01/09/18 1920)  BP: 134/83 (01/09/18 1920)  SpO2: 96 % (01/09/18 1920) Vital Signs (24h Range):  Temp:  [97.4 °F (36.3 °C)-98.5 °F (36.9 °C)] 98.4 °F (36.9 °C)  Pulse:  [74-93] 77  Resp:  [16-18] 16  SpO2:  [96 %-98 %] 96 %  BP: (129-146)/(67-83) 134/83     Weight: 80.5 kg (177 lb 6.4 oz)  Body mass index is 26.97 kg/m².    SpO2: 96 %  O2 Device (Oxygen Therapy): room air      Intake/Output Summary (Last 24 hours) at 01/09/18 2044  Last data filed at  01/09/18 0620   Gross per 24 hour   Intake              360 ml   Output              900 ml   Net             -540 ml       Lines/Drains/Airways     Peripheral Intravenous Line                 Peripheral IV - Single Lumen 01/07/18 2124 Left;Lateral Forearm 1 day                Physical Exam   Constitutional: He appears well-developed and well-nourished.   Cardiovascular: Normal rate, S1 normal and S2 normal.  An irregularly irregular rhythm present. Exam reveals gallop.    Murmur heard.  Pulmonary/Chest: Effort normal and breath sounds normal.     Current Medications:     allopurinol  100 mg Oral Daily    calcium citrate  200 mg Oral Daily    cefTRIAXone (ROCEPHIN) IVPB  1 g Intravenous Q24H    cloNIDine  0.1 mg Oral TID    digoxin  0.125 mg Oral QHS    finasteride  5 mg Oral Daily    furosemide  20 mg Oral Daily    metoprolol succinate  25 mg Oral Daily    rosuvastatin  40 mg Oral Daily    vancomycin (VANCOCIN) IVPB  15 mg/kg Intravenous Daily    vitamin D  1,000 Units Oral Daily     Current Laboratory Results:    Recent Results (from the past 24 hour(s))   VANCOMYCIN, TROUGH before 4th dose    Collection Time: 01/08/18 10:06 PM   Result Value Ref Range    Vancomycin-Trough 5.4 (L) 10.0 - 22.0 ug/mL     Current Imaging Results:    Imaging Results    None           Assessment and Plan:     Problem List:    Active Diagnoses:    Diagnosis Date Noted POA    PRINCIPAL PROBLEM:  Cellulitis of right knee [L03.115] 01/07/2018 Yes    Heart failure, systolic and diastolic, chronic [I50.42] 11/17/2016 Yes    Atrial fibrillation [I48.91] 05/22/2013 Yes    Chronic anticoagulation [Z79.01] 08/28/2014 Not Applicable    Hypertension [I10] 11/17/2016 Yes    Hypercholesterolemia [E78.00] 11/17/2016 Yes    Leukocytosis [D72.829] 01/09/2018 Yes      Problems Resolved During this Admission:    Diagnosis Date Noted Date Resolved POA     Assessment and Plan:     1. Heart Failure, Systolic & Diastolic, Chronic               8/1/2016: Echo: Normal left ventricular size with mild systolic dysfunction. EF 45%. Markedly dilated LA. Moderate aortic valve sclerosis - 1.6 m/s.              1/8/2018: Normal left ventricular size with low normal systolic dysfunction. EF 50-55%. Anteroseptal wall mildly hypokinetic. Mild LVH. Markedly dilated LA. Moderate aortic valve sclerosis - 1.5 m/s.               On losartan 100 mg Q24, metoprolol 25 Q24 and furosemide 40 mg Q24.              Off ramipril 10 mg Q24 due to dry cough.              Appears reasonably well compensated.              Previously seen edema of ankles well controlled with pressure stockings..     2. Atrial Fibrillation              2010: Diagnosed with permanent atrial fibrillation.              CHADS2=4 (CHAS2). [id number]=5 (PQY7A6O)              On warfarin.              On metoprolol 25 mg Q24 and digoxin 0.25 mg Q24.              Rate appear well controlled.     3. Chronic Anticoagulation              2010: Diagnosed with permanent atrial fibrillation.              CHADS2=4 (CHAS2). [id number]=9 (UCG9U5D).              On warfarin. Anticoagulation managed by Dr. Pressley.              No aspirin or NSAID.              No bleeding.              INR followed.              Discussed option of NOAC in detail in the past.     4. Carotid Artery Stenosis              2004: Right CEA.              8/26/2014: Carotid Duplex: DIANNA: Mild. LICA: Critical - 3.4 m/s.              9/11/2014: [facility] [place]: 4V: DIANNA: Mild. Left Vert: 90%. LICA: 95%.              9/2014: Left CEA.              12/1/2016: Carotid Duplex: Moderate plaquing.              12/13/2017: Carotid Duplex: Moderate plaquing.              No need for aspirin.              Plan next Carotid Duplex in 12/2018 and yearly.     5. History of Cerebrovascular Accident              5/2004: CVA. Left sided hemiplegia. Received tPA. Good recovery.              7/2004: Left temporal CVA. Weak.     6. Peripheral Artery Disease               10/25/2016: Arterial Duplex: Right: SFA: Distal 1.8 m/s. Left: No obstructive disease above knee.              Asymptomatic.     7. Hypertension              1990: Diagnosed.               On losartan 100 mg Q24, metoprolol 25 Q24, clonidine 0.1 mg Q8, nifedipine 30 mg Q12 and furosemide 40 mg Q24.              Keeping log at home              Well controlled.     8. Hypercholesterolemia              2004: Began statin.              On rosuvastatin 40 mg Q24.              Tells me well controlled.     9. Knee Pain              1/5/2018: Right knee pain became severe.              Evaluation in progress.   Infections vs hemarthrosis.     10. Primary Care              Dr. Dave Luna, Sr.      VTE Risk Mitigation         Ordered     Medium Risk of VTE  Once      01/08/18 0157     Place sequential compression device  Until discontinued      01/08/18 0157          Zachary Pressley MD  Cardiology  [facility]

## 2018-01-10 NOTE — SUBJECTIVE & OBJECTIVE
Interval History:   Pt new to me.  Doing okay.  Has pain swelling to right knee.  History of recent fluid aspirations and steroid injections.      Review of Systems   Constitutional: Negative for appetite change and fever.   Respiratory: Negative for cough and shortness of breath.    Cardiovascular: Negative for chest pain and palpitations.   Gastrointestinal: Negative for nausea and vomiting.   Musculoskeletal: Positive for joint swelling.   Skin: Negative for color change and rash.   Neurological: Negative for dizziness and weakness.   Psychiatric/Behavioral: Negative for agitation and confusion.     Objective:     Vital Signs (Most Recent):  Temp: 98.5 °F (36.9 °C) (01/09/18 1206)  Pulse: 74 (01/09/18 1206)  Resp: 18 (01/09/18 0745)  BP: 139/72 (01/09/18 1206)  SpO2: 98 % (01/09/18 1206) Vital Signs (24h Range):  Temp:  [97.4 °F (36.3 °C)-98.5 °F (36.9 °C)] 98.5 °F (36.9 °C)  Pulse:  [74-93] 74  Resp:  [14-18] 18  SpO2:  [97 %-98 %] 98 %  BP: (129-146)/(65-82) 139/72     Weight: 80.5 kg (177 lb 6.4 oz)  Body mass index is 26.97 kg/m².    Intake/Output Summary (Last 24 hours) at 01/09/18 1847  Last data filed at 01/09/18 0620   Gross per 24 hour   Intake              360 ml   Output              900 ml   Net             -540 ml      Physical Exam   Constitutional: He is oriented to person, place, and time. He appears well-developed and well-nourished.   HENT:   Head: Normocephalic and atraumatic.   Eyes: Conjunctivae are normal. Pupils are equal, round, and reactive to light.   Neck: Normal range of motion. Neck supple.   Cardiovascular: Normal rate.    Irregularly irregular.   Pulmonary/Chest: Effort normal and breath sounds normal.   Abdominal: Soft.   Musculoskeletal: He exhibits edema and tenderness.   Painful limited ROM with passive motion; swelling and warmth.  Skin does not appear cellulitic currently.    Neurological: He is alert and oriented to person, place, and time.   Skin: Skin is warm and dry.        Significant Labs:   CBC:   Recent Labs  Lab 01/07/18  2130   WBC 16.64*   HGB 12.3*   HCT 36.5*   *     CMP:   Recent Labs  Lab 01/07/18  2130   *   K 3.9   CL 95   CO2 27   *   BUN 22   CREATININE 1.1   CALCIUM 9.4   ANIONGAP 10   EGFRNONAA >60     Coagulation: No results for input(s): PT, INR, APTT in the last 48 hours.    Significant Imaging: I have reviewed all pertinent imaging results/findings within the past 24 hours.   Imaging Results    None

## 2018-01-10 NOTE — ASSESSMENT & PLAN NOTE
- Noted today.  - May be related to antibiotic vs other med?  - Mild.  - Gave Benadryl dose and prednisone 10 yesterday, still present on back.  Discussed with Dr. Anders, and agree may be heat rash or related to contact.  Not present on front torso.   - Monitor

## 2018-01-10 NOTE — CONSULTS
[facility]  Infectious Disease  Consult Note    Patient Name: Alan Gutiérrez  MRN: [id number]  Admission Date: 1/7/2018  Hospital Length of Stay: 0 days  Attending Physician: Rashad Walter MD  Primary Care Provider: Dave Luna Sr, MD     Isolation Status: No active isolations    Patient information was obtained from patient, spouse/SO, past medical records and ER records.      Inpatient consult to Infectious Diseases  Consult performed by: JERI KOROMA  Consult ordered by: RASHAD WALTER        Assessment/Plan:     Leukocytosis    - resolved  - doubt septic arthritis  - seems more likely a resolving hemarthrosis  - culture is pending  - if sterile, consider changing to po doxycycline for prophylaxis until blood is reabsorbed            Thank you for your consult. I will follow-up with patient. Please contact us if you have any additional questions.    Jeri Koroma MD  Infectious Disease  [facility]    Subjective:     Principal Problem: Cellulitis of right knee    HPI: Patient is a 77 y.o. male presents with worsening right knee pain. Pt had Rt Knee pain and underwent steroid injection by Dr. Diehl 12/21/17 with interval improvement, but pain slowly returned so that he presented for re-evaluation in outpt ortho clinic on 1/5/18 and had another steroid injection in same knee. This did not relieve pain, but rather over next few days pain escalated such that he could no longer walk or sleep and was advised to come to ED. The patient was seen by Dr. Gonzalez and an aspiration was performed. Culture is NGTD. I am consulted for abx recommendations.    Past Medical History:   Diagnosis Date    A-fib     Carotid artery stenosis 8/28/2014 8/26/2014: Carotid duplex: DIANNA: mild. LICA: critical - 3.4 m/s.    CHF (congestive heart failure)     Chronic anticoagulation 8/28/2014    Heart failure, systolic and diastolic, chronic 11/17/2016 8/1/2016: Echo: Normal left  ventricular size with mild systolic dysfunction. EF 45%. Markedly dilated LA. Moderate aortic valve sclerosis - 1.6 m/s.    Herniated disc     Hypercholesterolemia 11/17/2016 2004: Began statin.    Hypertension     Hypertension 11/17/2016 1990: Diagnosed.    Lipids abnormal     OA (osteoarthritis)     Osteopenia     Peripheral artery disease 12/1/2016    10/25/2016: Arterial Duplex: Right: SFA: Distal 1.8 m/s. Left: No obstructive disease above knee.    Stroke     2004       Past Surgical History:   Procedure Laterality Date    BLADDER TUMOR EXCISION      CAROTID ENDARTERECTOMY      Left    EYE SURGERY      bilateral kory    HERNIA REPAIR      knee scope Right     SKIN BIOPSY      ca    TONSILLECTOMY      tumors removed chest Bilateral        Review of patient's allergies indicates:  No Known Allergies    Medications:  Prescriptions Prior to Admission   Medication Sig    allopurinol (ZYLOPRIM) 100 MG tablet Take 100 mg by mouth once daily.    ascorbic acid (VITAMIN C) 100 MG tablet Take 100 mg by mouth once daily.    calcium citrate (CALCITRATE) 200 mg (950 mg) tablet Take 1 tablet by mouth once daily.    cloNIDine (CATAPRES) 0.1 MG tablet take one tablet by mouth every 8 hours at 7 am, 1 pm, and 9 pm    digoxin (LANOXIN) 125 mcg tablet TAKE 1 TABLET ONE TIME DAILY    finasteride (PROSCAR) 5 mg tablet Take 5 mg by mouth once daily.    fish oil-omega-3 fatty acids 300-1,000 mg capsule Take 2 g by mouth once daily.    furosemide (LASIX) 40 MG tablet Take 1 tablet (40 mg total) by mouth once daily.    gabapentin (NEURONTIN) 100 MG capsule Take 200 mg by mouth 3 (three) times daily.    glucosamine-chondroitin 500-400 mg tablet Take 1 tablet by mouth 3 (three) times daily.    hydrocodone-acetaminophen 5-325mg (NORCO) 5-325 mg per tablet Take 1 tablet by mouth every 8 (eight) hours as needed for Pain.    losartan (COZAAR) 100 MG tablet Take 100 mg by mouth once daily.    metoprolol  succinate (TOPROL-XL) 25 MG 24 hr tablet TAKE 1 TABLET ONE TIME DAILY    NIFEdipine (PROCARDIA-XL) 30 MG (OSM) 24 hr tablet Take 30 mg by mouth every morning.    NIFEdipine (PROCARDIA-XL) 60 MG (OSM) 24 hr tablet Take 60 mg by mouth every evening.    potassium chloride (MICRO-K) 10 MEQ CpSR Take 10 mEq by mouth once daily.     rosuvastatin (CRESTOR) 40 MG Tab Take 40 mg by mouth once daily.    vitamin D 1000 units Tab Take 185 mg by mouth once daily.    warfarin (COUMADIN) 5 MG tablet TAKE 1 TABLET DAILY. DOSE TO BE ADJUSTED ACCORDING TO INR. (Patient taking differently: TAKE 1 & 1/2 TABLETS ( 7.5 MG)  BY MOUTH DAILY ON TUESDAY, WEDNESDAY, SATURDAY, AND SUNDAY. TAKE ONE TABLET ( 5 MG) BY MOUTH ON MONDAY, THURSDAY, AND FRIDAY. DOSE TO BE ADJUSTED ACCORDING TO INR.)    co-enzyme Q-10 50 mg capsule Take 50 mg by mouth once daily.    influenza (FLUZONE HIGH-DOSE 2017-18, PF,) 180 mcg/0.5 mL vaccine Inject 0.5 mLs into the muscle once. a year    MULTIVITAMIN W-MINERALS/LUTEIN (CENTRUM SILVER ORAL) Take 1 tablet by mouth once daily.      Antibiotics     Start     Stop Route Frequency Ordered    01/09/18 0000  cefTRIAXone (ROCEPHIN) 1 g in dextrose 5 % 50 mL IVPB      -- IV Every 24 hours (non-standard times) 01/08/18 0212    01/08/18 2300  vancomycin (VANCOCIN) 1,250 mg in dextrose 5 % 250 mL IVPB  (Vancomycin IVPB with levels panel)      -- IV Daily 01/08/18 0212        Antifungals     None        Antivirals     None           Immunization History   Administered Date(s) Administered    Influenza - Trivalent (ADULT) 09/24/2015       Family History     None        Social History     Social History    Marital status:      Spouse name: N/A    Number of children: N/A    Years of education: N/A     Social History Main Topics    Smoking status: Never Smoker    Smokeless tobacco: Never Used    Alcohol use Yes      Comment: couple times yearly     Drug use: No    Sexual activity: Not Asked     Other  Topics Concern    None     Social History Narrative    None     Review of Systems   Constitutional: Negative for chills.   Musculoskeletal: Positive for arthralgias and joint swelling.   All other systems reviewed and are negative.    Objective:     Vital Signs (Most Recent):  Temp: 98.7 °F (37.1 °C) (01/10/18 0727)  Pulse: 87 (01/10/18 0727)  Resp: 18 (01/10/18 0727)  BP: 138/80 (01/10/18 0727)  SpO2: 96 % (01/10/18 0727) Vital Signs (24h Range):  Temp:  [97.5 °F (36.4 °C)-98.7 °F (37.1 °C)] 98.7 °F (37.1 °C)  Pulse:  [74-94] 87  Resp:  [16-20] 18  SpO2:  [96 %-99 %] 96 %  BP: (134-154)/(72-83) 138/80     Weight: 80.5 kg (177 lb 6.4 oz)  Body mass index is 26.97 kg/m².    Estimated Creatinine Clearance: 58 mL/min (based on SCr of 1 mg/dL).    Physical Exam   Constitutional: He is oriented to person, place, and time. He appears well-developed and well-nourished. No distress.   HENT:   Head: Normocephalic and atraumatic.   Eyes: Pupils are equal, round, and reactive to light.   Musculoskeletal: He exhibits edema and tenderness.   No erythema   Neurological: He is alert and oriented to person, place, and time.   Skin: Skin is warm and dry. Capillary refill takes less than 2 seconds. He is not diaphoretic.   Bruising left thigh laterally   Psychiatric: He has a normal mood and affect. His behavior is normal. Judgment and thought content normal.   Nursing note and vitals reviewed.      Significant Labs:   CBC:   Recent Labs  Lab 01/10/18  0537   WBC 9.71   HGB 10.3*   HCT 31.1*        Wound Culture:   Recent Labs  Lab 01/08/18  1304   LABAERO No growth       Significant Imaging: I have reviewed all pertinent imaging results/findings within the past 24 hours.

## 2018-01-11 VITALS
HEART RATE: 77 BPM | WEIGHT: 177.38 LBS | BODY MASS INDEX: 26.88 KG/M2 | DIASTOLIC BLOOD PRESSURE: 92 MMHG | SYSTOLIC BLOOD PRESSURE: 173 MMHG | RESPIRATION RATE: 18 BRPM | OXYGEN SATURATION: 96 % | TEMPERATURE: 97 F | HEIGHT: 68 IN

## 2018-01-11 LAB
INR PPP: 1.1
PROTHROMBIN TIME: 12 SEC

## 2018-01-11 PROCEDURE — 63600175 PHARM REV CODE 636 W HCPCS: Performed by: HOSPITALIST

## 2018-01-11 PROCEDURE — 97530 THERAPEUTIC ACTIVITIES: CPT

## 2018-01-11 PROCEDURE — 25000003 PHARM REV CODE 250

## 2018-01-11 PROCEDURE — 25000003 PHARM REV CODE 250: Performed by: NURSE PRACTITIONER

## 2018-01-11 PROCEDURE — 97535 SELF CARE MNGMENT TRAINING: CPT

## 2018-01-11 PROCEDURE — 99239 HOSP IP/OBS DSCHRG MGMT >30: CPT | Mod: ,,, | Performed by: HOSPITALIST

## 2018-01-11 PROCEDURE — 99214 OFFICE O/P EST MOD 30 MIN: CPT | Mod: ,,, | Performed by: INTERNAL MEDICINE

## 2018-01-11 PROCEDURE — 85610 PROTHROMBIN TIME: CPT

## 2018-01-11 PROCEDURE — 11000001 HC ACUTE MED/SURG PRIVATE ROOM

## 2018-01-11 PROCEDURE — 97116 GAIT TRAINING THERAPY: CPT

## 2018-01-11 PROCEDURE — 25000003 PHARM REV CODE 250: Performed by: PHYSICIAN ASSISTANT

## 2018-01-11 PROCEDURE — 97165 OT EVAL LOW COMPLEX 30 MIN: CPT

## 2018-01-11 PROCEDURE — 63600175 PHARM REV CODE 636 W HCPCS: Performed by: NURSE PRACTITIONER

## 2018-01-11 PROCEDURE — 36415 COLL VENOUS BLD VENIPUNCTURE: CPT

## 2018-01-11 RX ORDER — HYDROCODONE BITARTRATE AND ACETAMINOPHEN 5; 325 MG/1; MG/1
1 TABLET ORAL EVERY 8 HOURS PRN
Qty: 30 TABLET | Refills: 0 | Status: SHIPPED | OUTPATIENT
Start: 2018-01-11 | End: 2018-03-02 | Stop reason: CLARIF

## 2018-01-11 RX ORDER — ENOXAPARIN SODIUM 100 MG/ML
40 INJECTION SUBCUTANEOUS EVERY 24 HOURS
Status: DISCONTINUED | OUTPATIENT
Start: 2018-01-11 | End: 2018-01-11 | Stop reason: HOSPADM

## 2018-01-11 RX ORDER — DIPHENHYDRAMINE HCL 25 MG
25 CAPSULE ORAL EVERY 6 HOURS PRN
Status: DISCONTINUED | OUTPATIENT
Start: 2018-01-11 | End: 2018-01-11 | Stop reason: HOSPADM

## 2018-01-11 RX ORDER — DOXYCYCLINE HYCLATE 100 MG
100 TABLET ORAL EVERY 12 HOURS
Status: DISCONTINUED | OUTPATIENT
Start: 2018-01-11 | End: 2018-01-11 | Stop reason: HOSPADM

## 2018-01-11 RX ORDER — DOXYCYCLINE 100 MG/1
100 CAPSULE ORAL EVERY 12 HOURS
Qty: 28 CAPSULE | Refills: 0 | Status: SHIPPED | OUTPATIENT
Start: 2018-01-11 | End: 2018-01-25

## 2018-01-11 RX ADMIN — METOPROLOL SUCCINATE 25 MG: 25 TABLET, EXTENDED RELEASE ORAL at 09:01

## 2018-01-11 RX ADMIN — CLONIDINE HYDROCHLORIDE 0.1 MG: 0.1 TABLET ORAL at 02:01

## 2018-01-11 RX ADMIN — FINASTERIDE 5 MG: 5 TABLET, FILM COATED ORAL at 09:01

## 2018-01-11 RX ADMIN — ENOXAPARIN SODIUM 40 MG: 100 INJECTION SUBCUTANEOUS at 04:01

## 2018-01-11 RX ADMIN — OXYCODONE HYDROCHLORIDE AND ACETAMINOPHEN 1 TABLET: 5; 325 TABLET ORAL at 04:01

## 2018-01-11 RX ADMIN — OXYCODONE HYDROCHLORIDE AND ACETAMINOPHEN 1 TABLET: 5; 325 TABLET ORAL at 05:01

## 2018-01-11 RX ADMIN — CEFTRIAXONE 1 G: 1 INJECTION, SOLUTION INTRAVENOUS at 12:01

## 2018-01-11 RX ADMIN — CLONIDINE HYDROCHLORIDE 0.1 MG: 0.1 TABLET ORAL at 05:01

## 2018-01-11 RX ADMIN — ROSUVASTATIN CALCIUM 40 MG: 10 TABLET, FILM COATED ORAL at 09:01

## 2018-01-11 RX ADMIN — VITAMIN D, TAB 1000IU (100/BT) 1000 UNITS: 25 TAB at 09:01

## 2018-01-11 RX ADMIN — OXYCODONE HYDROCHLORIDE AND ACETAMINOPHEN 1 TABLET: 5; 325 TABLET ORAL at 01:01

## 2018-01-11 RX ADMIN — FUROSEMIDE 20 MG: 20 TABLET ORAL at 09:01

## 2018-01-11 RX ADMIN — CALCIUM CITRATE 200 MG (950 MG) TABLET 200 MG: at 09:01

## 2018-01-11 RX ADMIN — OXYCODONE HYDROCHLORIDE AND ACETAMINOPHEN 1 TABLET: 5; 325 TABLET ORAL at 11:01

## 2018-01-11 RX ADMIN — ALLOPURINOL 100 MG: 100 TABLET ORAL at 09:01

## 2018-01-11 RX ADMIN — DIPHENHYDRAMINE HYDROCHLORIDE 25 MG: 25 CAPSULE ORAL at 11:01

## 2018-01-11 NOTE — ASSESSMENT & PLAN NOTE
- Noted today.  - May be related to antibiotic vs other med, vs heat rash?  - Mild.  - Gave Benadryl dose and prednisone 10 day before discharge, still present on back.  Discussed with Dr. Anders, and agree may be heat rash or related to contact.  Not present on front torso.   - Discussed with wife would expect to resolve.

## 2018-01-11 NOTE — PLAN OF CARE
"1535-Writer spoke with patient's wife who is agreeable to [facility]. Patient to D/C with Eloquis, doxycycline, and norco; writer spoke with Argenis from [facility] who will process prescriptions. Writer previously spoke with Yamileth from [facility] pharmacy who spoke with patient's wife and in agreement with paying for Rx.     Wife has no preference for Home Health, but would like referral to "well known" agency. Writer sent referral to [facility] as Dr. Gonzalez works with [place].        "

## 2018-01-11 NOTE — ASSESSMENT & PLAN NOTE
- Cardiology noted:   2010: Diagnosed with permanent atrial fibrillation.              CHADS2=4 (CHAS2). [id number]=9 ([id number])              On warfarin.  Will change to Eliquis after discharge.               On metoprolol 25 mg Q24 and digoxin 0.25 mg Q24.              Rate appear well controlled.  - Discussed with Dr. Pressley and will change to Eliquis 5 mg BID after discharge.   - Discussed with Dr. Pressley, and discussed with wife to begin Eliquis on Saturday, 1/13/18, as there was concern for hemarthrosis.   - Wife expressed understanding.

## 2018-01-11 NOTE — ASSESSMENT & PLAN NOTE
- Did not appreciated much cellulitis by time seen.   - Right knee join with swelling and warmth. WBC elevated. Afebrile  - Vancomycin/Rocephin 3 days in hospital.   .  - Right knee fluid culture and blood culture no growth at 3 days.  Will change to oral doxycycline.   - Orthopedics consulted.  Discussed with Dr. Gonzalez.  Dr. Gonzalez feels patient may have a hemarthrosis as patient on coumadin with elevated INR, and that it will likely resorb.  Discussed with Dr. Anders and ultimately no MRI obtained.  US of RLE showed no evidence DVT.   - Plan to discussed with radiology to determine optimal imaging modality to characterize.   - ESR and CRP elevated  - Follow up with PCP Dr. Luna.

## 2018-01-11 NOTE — ASSESSMENT & PLAN NOTE
- Cardiology following  - INR daily  - Discussed with Dr. Pressley and given concern for hemarthrosis, would start Eliquis in 2 days.  - Given one DVT prophylaxis dose Lovenox prior to discharge.

## 2018-01-11 NOTE — PT/OT/SLP PROGRESS
Physical Therapy Treatment    Patient Name:  Alan Gutiérrez   MRN:  [id number]    Recommendations:     Discharge Recommendations:  home, home with home health, home health PT, home health OT   Discharge Equipment Recommendations: bedside commode   Barriers to discharge: None    Assessment:     Alan Gutiérrez is a 77 y.o. male admitted with a medical diagnosis of Cellulitis of right knee.  He presents with the following impairments/functional limitations:  weakness, gait instability, pain, impaired functional mobilty, impaired balance, decreased lower extremity function, decreased ROM, impaired endurance .  Pt with c/o high level of pain with weightbearing was instructed on unwieghting R LE while using RW.    Rehab Prognosis:  excellent; patient would benefit from acute skilled PT services to address these deficits and reach maximum level of function.      Recent Surgery: * No surgery found *      Plan:     During this hospitalization, patient to be seen 5 x/week to address the above listed problems via gait training, therapeutic activities, therapeutic exercises  · Plan of Care Expires:  01/11/18   Plan of Care Reviewed with: patient, spouse    Subjective     Communicated with patient and spouse prior to session.  Patient found in BS chair upon PT entry to room, agreeable to treatment.      Chief Complaint: R knee pain  Patient comments/goals: to get knee replacement  Pain/Comfort:  · Pain Rating 1: 8/10  · Location - Side 1: Right  · Location 1: knee  · Pain Addressed 1: Pre-medicate for activity, Cessation of Activity    Patients cultural, spiritual, Lutheran conflicts given the current situation: none mentioned    Objective:     Patient found with: peripheral IV     General Precautions: Standard,  (fall risk; ambulate with assist)   Orthopedic Precautions:N/A   Braces: N/A     Functional Mobility:  · Bed Mobility:     · Sit to Supine: minimum assistance  · Transfers:     · Sit to Stand:  contact guard  assistance with rolling walker  · Gait: amb 5' with RW and CGA  · Balance: good standing dynamic balance      AM-PAC 6 CLICK MOBILITY  Turning over in bed (including adjusting bedclothes, sheets and blankets)?: 3  Sitting down on and standing up from a chair with arms (e.g., wheelchair, bedside commode, etc.): 3  Moving from lying on back to sitting on the side of the bed?: 3  Moving to and from a bed to a chair (including a wheelchair)?: 3  Need to walk in hospital room?: 3  Climbing 3-5 steps with a railing?: 3  Total Score: 18       Therapeutic Activities and Exercises:   pt taught SLR and QS    Patient left supine with all lines intact, call button in reach, nurse notified and wife present..    GOALS:    Physical Therapy Goals        Problem: Physical Therapy Goal    Goal Priority Disciplines Outcome Goal Variances Interventions   Physical Therapy Goal     PT/OT, PT Ongoing (interventions implemented as appropriate)     Description:  Goals to be met by: 18     Patient will increase functional independence with mobility by performin. Sit<>stand transfer with supervision using rolling walker.   2. Gait > 15 feet with SBA using rolling walker.   3. Ascend/descend curb step with CGA with rolling walker.                      Time Tracking:     PT Received On: 18  PT Start Time: 1330     PT Stop Time: 1348  PT Total Time (min): 18 min     Billable Minutes: Gait Training 18    Treatment Type: Treatment  PT/PTA: PT     PTA Visit Number: 0     Yifan Guerra, PT  2018

## 2018-01-11 NOTE — ASSESSMENT & PLAN NOTE
- resolved  - doubt septic arthritis  - seems more likely a resolving hemarthrosis  - culture is NGTD  - rash heat rash v drug rxn  - will change to po doxycycline x 14 days  - okay to discharge from an ID standpoint

## 2018-01-11 NOTE — PROGRESS NOTES
"[facility] Hospital Medicine  Progress Note    Patient Name: Alan Gutiérrez  MRN: [id number]  Patient Class: IP- Inpatient   Admission Date: 1/7/2018  Length of Stay: 0 days  Attending Physician: Michael Boston MD  Primary Care Provider: Dave Luna Sr, MD        Subjective:     Principal Problem:Cellulitis of right knee    HPI:  Patient is a 77 y.o. male who presents to the ED with complaint of right knee pain. He has had intermittent chronic right knee pain for over twenty years s/p arthroscopic knee surgery, and is followed by Dr. Gonzalez, orthopedic surgery. Wife reports onset of current episode of knee pain two weeks ago, and states "this is the worst it has ever been." Patient woke up unable to walk or bear weight secondary to pain two weeks ago. He was seen by Dr. Gonzalez at that time and reportedly had fluid drained and was given a cortisone shot with temporary improvement. 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 Gonzalez's PA two days ago and had fluid drained and was given cortisone injection again without relief. He was seen here yesterday for intractable pain, and had a dry tap. He reports temporary relief to pain with morphine yesterday. Patient reports worsening of pain since being discharged yesterday, and currently complains of pain and swelling of the knee. Pain radiates from the knee into the distal thigh and is worse with movement. He is unable to bear weight secondary to pain. He denies fever, nausea, or vomiting. He has an appointment with physical therapy in two days, and is "starting the process of having the knee replaced." He has no additional complaints.    Hospital Course:  No notes on file    Interval History:   Doing okay, has right knee pain as previous.  States was OOB.  Discussed POC fully with wife.    Review of Systems   Constitutional: Negative for appetite change and " fever.   Respiratory: Negative for cough and shortness of breath.    Cardiovascular: Negative for chest pain and palpitations.   Gastrointestinal: Negative for nausea and vomiting.   Musculoskeletal: Positive for joint swelling.        Right knee pain   Skin: Negative for color change and rash.   Neurological: Negative for dizziness and weakness.   Psychiatric/Behavioral: Negative for agitation and confusion.     Objective:     Vital Signs (Most Recent):  Temp: 97.3 °F (36.3 °C) (01/11/18 0741)  Pulse: 77 (01/11/18 0741)  Resp: 18 (01/11/18 0741)  BP: (!) 173/92 (01/11/18 0741)  SpO2: 96 % (01/11/18 0741) Vital Signs (24h Range):  Temp:  [97.3 °F (36.3 °C)-97.9 °F (36.6 °C)] 97.3 °F (36.3 °C)  Pulse:  [73-97] 77  Resp:  [18] 18  SpO2:  [96 %-99 %] 96 %  BP: (130-173)/(66-92) 173/92     Weight: 80.5 kg (177 lb 6.4 oz)  Body mass index is 26.97 kg/m².    Intake/Output Summary (Last 24 hours) at 01/11/18 1459  Last data filed at 01/11/18 0400   Gross per 24 hour   Intake                0 ml   Output              350 ml   Net             -350 ml      Physical Exam    Significant Labs:   BMP:   Recent Labs  Lab 01/10/18  0537      *   K 3.7      CO2 30*   BUN 17   CREATININE 1.0   CALCIUM 8.4*   MG 2.1     CBC:   Recent Labs  Lab 01/10/18  0537   WBC 9.71   HGB 10.3*   HCT 31.1*          Significant Imaging: I have reviewed all pertinent imaging results/findings within the past 24 hours.         Assessment/Plan:      * Cellulitis of right knee    - Right knee join with swelling and warmth. WBC elevated. Afebrile  - Vancomycin/Rocephin day 3.  - Right knee fluid culture pending  - Orthopedics consulted.  Discussed with Dr. Gonzalez.  Dr. Gonzalez feels patient may have a hemarthrosis as patient on coumadin with elevated INR, and that it will likely resorb.  Suggested imaging, possibly MRI or US.   - Plan to discussed with radiology to determine optimal imaging modality to characterize.   - ESR and  CRP elevated  - Discussed with Dr. Cifuentes PCP.             Rash of back    - Noted today.  - May be related to antibiotic vs other med?  - Mild.  - Gave Benadryl dose and prednisone 10 yesterday, still present on back.  Discussed with Dr. Anders, and agree may be heat rash or related to contact.  Not present on front torso.   - Monitor            Leukocytosis    - Monitor  - Afebrile, WBC WNL now.  - Blood culture 1/7/18 no growth so far.   - ID noted:     - resolved  - doubt septic arthritis  - seems more likely a resolving hemarthrosis  - culture is pending  - if sterile, consider changing to po doxycycline for prophylaxis until blood is reabsorbed   - Discussed with Dr. Anders and if culture remains no growth would prescribe doxycycline for 2 week limit with prompt orthopedic follow up, but noting that recommendation is for doxycycline prophylaxis until blood is reabsorbed.          Heart failure, systolic and diastolic, chronic    - Appears stable currently  - Dr. Pressley following.                Hypercholesterolemia    - Continue Crestor          Hypertension    - Currently normotensive  - Continue clonidine, lasix, toprol          Chronic anticoagulation    - Cardiology following  - INR daily  - Discussed with Dr. Pressley and given concern for hemarthrosis, would start Eliquis in 2 days.  - Place on DVT prophylaxis dose Lovenox.              Atrial fibrillation    - Cardiology noted:   2010: Diagnosed with permanent atrial fibrillation.              CHADS2=4 (CHAS2). [id number]=1 (VNO5S3Y)              On warfarin.              On metoprolol 25 mg Q24 and digoxin 0.25 mg Q24.              Rate appear well controlled.              VTE Risk Mitigation         Ordered     enoxaparin injection 40 mg  Daily     Route:  Subcutaneous        01/11/18 1508     warfarin tablet 7.5 mg  Daily     Route:  Oral        01/10/18 1214     Medium Risk of VTE  Once      01/08/18 0157     Place sequential compression  device  Until discontinued      01/08/18 0157              Michael Boston MD  Department of Hospital Medicine   [facility]

## 2018-01-11 NOTE — PROGRESS NOTES
[facility]  Cardiology  Progress Note    Patient Name: Alan Gutiérrez  MRN: [id number]  Admission Date: 1/7/2018  Hospital Length of Stay: 0 days  Code Status: Full Code   Attending Physician: Michael Boston MD   Primary Care Physician: Dave Luna Sr, MD  Expected Discharge Date:   Principal Problem:Cellulitis of right knee    Subjective:     Brief HPI:    Alan Gutiérrez is a 76 y.o. male with hypertension and hypercholesterolemia. He is overweight. He has a history of a cerebrovascular accident in 2004 and then had right carotid endarterectomy. He was diagnosed with atrial fibrillation in 2010. He has CHADS2 score of 5 (CHAS2) and a [id number] score of 7 (SMO6Q0S) being on warfarin. He has well compensated systolic as well as diastolic heart failure. He underwent left CEA on 9/12/2014. He has received DRAKE injection which has helped his back pain. He has been bothered by swelling of especially the left ankle which has improved after he began wrapping the legs. He denies any chest pain or dyspnea. No palpitations or weak spells. No bleeding.     Now presents with pain and swelling of his right knee.    Hospital Course:    Antibiotics iv.    Interval History:     Still with pain in right knee.    Review of Systems   Cardiovascular: Negative for chest pain.   Respiratory: Negative for hemoptysis and shortness of breath.      Objective:     Vital Signs (Most Recent):  Temp: 97.7 °F (36.5 °C) (01/10/18 1934)  Pulse: 97 (01/10/18 1934)  Resp: 18 (01/10/18 1934)  BP: 130/72 (01/10/18 1934)  SpO2: 98 % (01/10/18 1934) Vital Signs (24h Range):  Temp:  [97.5 °F (36.4 °C)-98.7 °F (37.1 °C)] 97.7 °F (36.5 °C)  Pulse:  [73-97] 97  Resp:  [18-20] 18  SpO2:  [96 %-99 %] 98 %  BP: (130-154)/(66-82) 130/72     Weight: 80.5 kg (177 lb 6.4 oz)  Body mass index is 26.97 kg/m².    SpO2: 98 %  O2 Device (Oxygen Therapy): room air      Intake/Output Summary (Last 24 hours) at 01/10/18 2044  Last data filed at  01/10/18 0500   Gross per 24 hour   Intake              540 ml   Output              200 ml   Net              340 ml       Lines/Drains/Airways     Peripheral Intravenous Line                 Peripheral IV - Single Lumen 01/09/18 2200 Left Forearm less than 1 day                Physical Exam   Constitutional: He appears well-developed and well-nourished.   Cardiovascular: Normal rate, S1 normal and S2 normal.  An irregularly irregular rhythm present. Exam reveals gallop.    Murmur heard.  Pulmonary/Chest: Effort normal and breath sounds normal.     Current Medications:     allopurinol  100 mg Oral Daily    calcium citrate  200 mg Oral Daily    cefTRIAXone (ROCEPHIN) IVPB  1 g Intravenous Q24H    cloNIDine  0.1 mg Oral TID    digoxin  0.125 mg Oral QHS    finasteride  5 mg Oral Daily    furosemide  20 mg Oral Daily    metoprolol succinate  25 mg Oral Daily    rosuvastatin  40 mg Oral Daily    vancomycin (VANCOCIN) IVPB  15 mg/kg Intravenous Daily    vitamin D  1,000 Units Oral Daily    warfarin  7.5 mg Oral Daily     Current Laboratory Results:    Recent Results (from the past 24 hour(s))   Protime-INR    Collection Time: 01/10/18  5:37 AM   Result Value Ref Range    Prothrombin Time 15.2 (H) 9.0 - 12.5 sec    INR 1.4 (H) 0.8 - 1.2   Magnesium    Collection Time: 01/10/18  5:37 AM   Result Value Ref Range    Magnesium 2.1 1.6 - 2.6 mg/dL   Phosphorus    Collection Time: 01/10/18  5:37 AM   Result Value Ref Range    Phosphorus 2.7 2.7 - 4.5 mg/dL   CBC auto differential    Collection Time: 01/10/18  5:37 AM   Result Value Ref Range    WBC 9.71 3.90 - 12.70 K/uL    RBC 3.61 (L) 4.60 - 6.20 M/uL    Hemoglobin 10.3 (L) 14.0 - 18.0 g/dL    Hematocrit 31.1 (L) 40.0 - 54.0 %    MCV 86 82 - 98 fL    MCH 28.5 27.0 - 31.0 pg    MCHC 33.1 32.0 - 36.0 g/dL    RDW 14.8 (H) 11.5 - 14.5 %    Platelets 247 150 - 350 K/uL    MPV 9.6 9.2 - 12.9 fL    Gran # 6.7 1.8 - 7.7 K/uL    Lymph # 1.4 1.0 - 4.8 K/uL    Mono # 1.0  0.3 - 1.0 K/uL    Eos # 0.4 0.0 - 0.5 K/uL    Baso # 0.04 0.00 - 0.20 K/uL    Gran% 69.3 38.0 - 73.0 %    Lymph% 14.4 (L) 18.0 - 48.0 %    Mono% 10.6 4.0 - 15.0 %    Eosinophil% 4.3 0.0 - 8.0 %    Basophil% 0.4 0.0 - 1.9 %    Differential Method Automated    Basic metabolic panel    Collection Time: 01/10/18  5:37 AM   Result Value Ref Range    Sodium 135 (L) 136 - 145 mmol/L    Potassium 3.7 3.5 - 5.1 mmol/L    Chloride 101 95 - 110 mmol/L    CO2 30 (H) 23 - 29 mmol/L    Glucose 100 70 - 110 mg/dL    BUN, Bld 17 8 - 23 mg/dL    Creatinine 1.0 0.5 - 1.4 mg/dL    Calcium 8.4 (L) 8.7 - 10.5 mg/dL    Anion Gap 4 (L) 8 - 16 mmol/L    eGFR if African American >60 >60 mL/min/1.73 m^2    eGFR if non African American >60 >60 mL/min/1.73 m^2     Current Imaging Results:    Imaging Results    None           Assessment and Plan:     Problem List:    Active Diagnoses:    Diagnosis Date Noted POA    PRINCIPAL PROBLEM:  Cellulitis of right knee [L03.115] 01/07/2018 Yes    Heart failure, systolic and diastolic, chronic [I50.42] 11/17/2016 Yes    Atrial fibrillation [I48.91] 05/22/2013 Yes    Chronic anticoagulation [Z79.01] 08/28/2014 Not Applicable    Hypertension [I10] 11/17/2016 Yes    Hypercholesterolemia [E78.00] 11/17/2016 Yes    Rash of back [R21] 01/10/2018 No    Leukocytosis [D72.829] 01/09/2018 Yes      Problems Resolved During this Admission:    Diagnosis Date Noted Date Resolved POA     Assessment and Plan:     1. Heart Failure, Systolic & Diastolic, Chronic              8/1/2016: Echo: Normal left ventricular size with mild systolic dysfunction. EF 45%. Markedly dilated LA. Moderate aortic valve sclerosis - 1.6 m/s.              1/8/2018: Normal left ventricular size with low normal systolic dysfunction. EF 50-55%. Anteroseptal wall mildly hypokinetic. Mild LVH. Markedly dilated LA. Moderate aortic valve sclerosis - 1.5 m/s.               On losartan 100 mg Q24, metoprolol 25 Q24 and furosemide 40 mg Q24.               Off ramipril 10 mg Q24 due to dry cough.              Appears reasonably well compensated.              Previously seen edema of ankles well controlled with pressure stockings.     2. Atrial Fibrillation              2010: Diagnosed with permanent atrial fibrillation.              CHADS2=4 (CHAS2). [id number]=7 (ZZR3P7S)              On warfarin.              On metoprolol 25 mg Q24 and digoxin 0.25 mg Q24.              Rate appear well controlled.     3. Chronic Anticoagulation              2010: Diagnosed with permanent atrial fibrillation.              CHADS2=4 (CHAS2). [id number]=7 (YEW5X0O).              On warfarin. Anticoagulation managed by Dr. Pressley.              No aspirin or NSAID.              No history of bleeding.              INR followed.   INR 1.4              Discussed option of NOAC in detail in the past.   Wants to switch over to apixiban.   Appears reasonable to begin apixiban 5 mg Q12 in a few days.      4. Carotid Artery Stenosis              2004: Right CEA.              8/26/2014: Carotid Duplex: DIANNA: Mild. LICA: Critical - 3.4 m/s.              9/11/2014: [facility]: 4V: DIANNA: Mild. Left Vert: 90%. LICA: 95%.              9/2014: Left CEA.              12/1/2016: Carotid Duplex: Moderate plaquing.              12/13/2017: Carotid Duplex: Moderate plaquing.              No need for aspirin.              Plan next Carotid Duplex in 12/2018 and yearly.     5. History of Cerebrovascular Accident              5/2004: CVA. Left sided hemiplegia. Received tPA. Good recovery.              7/2004: Left temporal CVA. Weak.     6. Peripheral Artery Disease              10/25/2016: Arterial Duplex: Right: SFA: Distal 1.8 m/s. Left: No obstructive disease above knee.              Asymptomatic.     7. Hypertension              1990: Diagnosed.               On losartan 100 mg Q24, metoprolol 25 Q24, clonidine 0.1 mg Q8, nifedipine 30 mg Q12 and furosemide 40 mg Q24.              Keeping log at  home              Well controlled.     8. Hypercholesterolemia              2004: Began statin.              On rosuvastatin 40 mg Q24.              Tells me well controlled.     9. Knee Pain              1/5/2018: Right knee pain became severe.              Evaluation in progress.   Appears to have hemarthrosis.     10. Primary Care              Dr. Dave Luna, Sr.      VTE Risk Mitigation         Ordered     warfarin tablet 7.5 mg  Daily     Route:  Oral        01/10/18 1214     Medium Risk of VTE  Once      01/08/18 0157     Place sequential compression device  Until discontinued      01/08/18 0157          Zachary Pressley MD  Cardiology  [facility]

## 2018-01-11 NOTE — PROGRESS NOTES
"Nephrology  Progress Note    Admit Date: 1/7/2018   LOS: 0 days     SUBJECTIVE:     Follow-up For:  Right Knee Pain    Interval History:     Still with knee pain.  No CP/SOB.  Of note, his polar ICE was warm all night and so far this am.  Discussed with wife.      Review of Systems:  Constitutional: No fever or chills  Respiratory: No cough or shortness of breath  Cardiovascular: No chest pain or palpitations  Gastrointestinal: No nausea or vomiting  Neurological: No confusion or weakness    OBJECTIVE:     Vital Signs Range (Last 24H):  BP (!) 173/92 (BP Location: Right arm, Patient Position: Lying)   Pulse 77   Temp 97.3 °F (36.3 °C) (Oral)   Resp 18   Ht 5' 8" (1.727 m)   Wt 80.5 kg (177 lb 6.4 oz)   SpO2 96%   BMI 26.97 kg/m²     Temp:  [97.3 °F (36.3 °C)-98.5 °F (36.9 °C)]   Pulse:  [73-97]   Resp:  [18]   BP: (130-173)/(66-92)   SpO2:  [96 %-99 %]     I & O (Last 24H):    Intake/Output Summary (Last 24 hours) at 01/11/18 1104  Last data filed at 01/11/18 0400   Gross per 24 hour   Intake                0 ml   Output              350 ml   Net             -350 ml       Physical Exam:  General appearance: Well developed, well nourished  Eyes:  Conjunctivae/corneas clear. PERRL.  Lungs: Normal respiratory effort,   clear to auscultation bilaterally   Heart: Irr/Regular rate and rhythm, S1, S2 normal, no murmur, rub or virgilio.  Abdomen: Soft, non-tender non-distended; bowel sounds normal; no masses,  no organomegaly  Extremities: No cyanosis or clubbing. No edema.  Right knee with polar ice.  Still decrease ROM/Pain   Skin: Skin color, texture, turgor normal. No rashes or lesions  Neurologic: Normal strength and tone. No focal numbness or weakness     Laboratory Data:  No results for input(s): WBC, RBC, HGB, HCT, PLT, MCV, MCH, MCHC in the last 24 hours.    BMP:     Recent Labs  Lab 01/10/18  0537      *   K 3.7      CO2 30*   BUN 17   CREATININE 1.0   CALCIUM 8.4*   MG 2.1     Lab Results "   Component Value Date    CALCIUM 8.4 (L) 01/10/2018    PHOS 2.7 01/10/2018               Medications:  Medication list was reviewed and changes noted under Assessment/Plan.    Diagnostic Results:        ASSESSMENT/PLAN:     1. CKD stage 3a-Appears at baseline.Renally dose meds, avoid nephrotoxins, and monitor I/O's closely.  2. HTN-Continue home meds  3. PVD/ Hypercholersterolemia/ Hx CVA/ Bilat CEA-Continue home meds  4. A. Fib on CAC/ Chronic Combined CHF-Appears well compensated. Initial INR supratheraputic and now wnl.   5. Right Knee pain, Edema, Leukocytosis- slowly improving.  Defer to ortho/ID.  6. Osteopenia/ OA/ Hyperuricemia- prn pain meds, Continue allopurinol.  7. BPH-Continue Finestride      Will follow from afar  Call with questions.   Nothing to add except keep keep polar ice cooling....

## 2018-01-11 NOTE — ASSESSMENT & PLAN NOTE
- Monitor  - Afebrile, WBC WNL now.  - Blood culture 1/7/18 no growth so far.   - ID noted:     - resolved  - doubt septic arthritis  - seems more likely a resolving hemarthrosis  - culture is pending  - if sterile, consider changing to po doxycycline for prophylaxis until blood is reabsorbed   - Discussed with Dr. Anders and if culture remains no growth would prescribe doxycycline for 2 week limit with prompt orthopedic follow up, but noting that recommendation is for doxycycline prophylaxis until blood is reabsorbed.

## 2018-01-11 NOTE — PROGRESS NOTES
[facility]  Infectious Disease  Progress Note    Patient Name: Alan Gutiérrez  MRN: [id number]  Admission Date: 1/7/2018  Length of Stay: 0 days  Attending Physician: Michael Boston MD  Primary Care Provider: Dave Luna Sr, MD    Isolation Status: No active isolations  Assessment/Plan:      Leukocytosis    - resolved  - doubt septic arthritis  - seems more likely a resolving hemarthrosis  - culture is NGTD  - rash heat rash v drug rxn  - will change to po doxycycline x 14 days  - okay to discharge from an ID standpoint           I will follow-up with patient. Please contact us if you have any additional questions.    Obi Anders MD  Infectious Disease  [facility]    Subjective:     Principal Problem:Cellulitis of right knee    HPI: Patient is a 77 y.o. male presents with worsening right knee pain. Pt had Rt Knee pain and underwent steroid injection by Dr. Diehl 12/21/17 with interval improvement, but pain slowly returned so that he presented for re-evaluation in outpt ortho clinic on 1/5/18 and had another steroid injection in same knee. This did not relieve pain, but rather over next few days pain escalated such that he could no longer walk or sleep and was advised to come to ED. The patient was seen by Dr. Gonzalez and an aspiration was performed. Culture is NGTD. I am consulted for abx recommendations.  Interval History: Doing well. Rash noted - better today. Knee improved but still with some pain.    Review of Systems   Constitutional: Negative for chills and fever.   Musculoskeletal: Positive for arthralgias.   Skin: Positive for rash.   All other systems reviewed and are negative.    Objective:     Vital Signs (Most Recent):  Temp: 97.3 °F (36.3 °C) (01/11/18 0741)  Pulse: 77 (01/11/18 0741)  Resp: 18 (01/11/18 0741)  BP: (!) 173/92 (01/11/18 0741)  SpO2: 96 % (01/11/18 0741) Vital Signs (24h Range):  Temp:  [97.3 °F (36.3 °C)-97.9 °F (36.6 °C)] 97.3 °F (36.3  °C)  Pulse:  [73-97] 77  Resp:  [18] 18  SpO2:  [96 %-99 %] 96 %  BP: (130-173)/(66-92) 173/92     Weight: 80.5 kg (177 lb 6.4 oz)  Body mass index is 26.97 kg/m².    Estimated Creatinine Clearance: 58 mL/min (based on SCr of 1 mg/dL).    Physical Exam   Constitutional: He is oriented to person, place, and time. He appears well-developed and well-nourished.   HENT:   Head: Normocephalic.   Eyes: Pupils are equal, round, and reactive to light.   Neurological: He is alert and oriented to person, place, and time.   Skin: Skin is warm and dry. Rash noted. He is not diaphoretic.   Nursing note and vitals reviewed.      Significant Labs:   Wound Culture:   Recent Labs  Lab 01/08/18  1304   LABAERO No growth       Significant Imaging: I have reviewed all pertinent imaging results/findings within the past 24 hours.

## 2018-01-11 NOTE — PLAN OF CARE
Problem: Occupational Therapy Goal  Goal: Occupational Therapy Goal  Goals to be met by: 1/10/2018    Patient will increase functional independence with ADLs by performing:    LE Dressing with Contact Guard Assistance.  Toileting from bedside commode with Stand-by Assistance for hygiene and clothing management.   Supine to sit with Supervision.  Stand pivot transfers with Stand-by Assistance.  Toilet transfer to bedside commode with Stand-by Assistance.    Outcome: Ongoing (interventions implemented as appropriate)  Pt c/o R knee pain 7/10 with transitional movement and WB with fx transfers with RW.  Pt. Would benefit from continued OT.   DME  Needs for home:  Hip kit and BSC.  Pt. and wife willing to pay OOP for hip kit.  HHHOT/HHPT recommended at discharge.  Continue with OT POC.

## 2018-01-11 NOTE — PLAN OF CARE
DC PLANNING:    Patient to D/C home with wife, [facility] to deliver Hip Kit and Bedside Commode. [facility] to follow 1/12/18. [facility] to process prescriptions. CM to remain supportive.     01/11/18 1990   Final Note   Assessment Type Final Discharge Note   Discharge Disposition Home-Health  ([facility])   Hospital Follow Up  Appt(s) scheduled? Yes   Discharge plans and expectations educations in teach back method with documentation complete? Yes

## 2018-01-11 NOTE — PLAN OF CARE
Problem: Patient Care Overview  Goal: Plan of Care Review  Outcome: Ongoing (interventions implemented as appropriate)  Plan of care reviewed with patient and spouse.  Pain managed with medication.  VSS.  IV antibiotics administered as ordered.  Purposeful rounding completed.  Call bell within reach, no needs at this time, will continue to monitor.

## 2018-01-11 NOTE — PT/OT/SLP EVAL
"Occupational Therapy   Evaluation    Name: Alan Gutiérrez  MRN: [id number]  Admitting Diagnosis:  Cellulitis of right knee      Recommendations:     Discharge Recommendations: home with home health, home health PT, home health OT  Discharge Equipment Recommendations:  bedside commode, hip kit  Barriers to discharge:  None    History:     Occupational Profile:  Living Environment: Pt lives with spouse in 2 story house with 4" step to enter. He has a bedroom and bathroom with shower stall and shower chair on wheels as well as standard height toilet with grab bar on the first floor and does not need to go to second story.    Prior level of function: patients level of function was modified independent earlier this year up until "the holidays" when pt's knee became more painful. Recently he has still been ambulating with a rolling walker, sits to shower on recently purchased shower chair, and uses the grab bar for toilet transfers. Since the holidays he has been unable to drive or grocery shop. His spouse purchased a transport w/c that they used to come to the hospital. Equipment Owned:  shower chair, grab bar, wheelchair, walker, rolling;  Assistance upon Discharge: wife    Past Medical History:   Diagnosis Date    A-fib     Carotid artery stenosis 8/28/2014 8/26/2014: Carotid duplex: DIANNA: mild. LICA: critical - 3.4 m/s.    CHF (congestive heart failure)     Chronic anticoagulation 8/28/2014    Heart failure, systolic and diastolic, chronic 11/17/2016 8/1/2016: Echo: Normal left ventricular size with mild systolic dysfunction. EF 45%. Markedly dilated LA. Moderate aortic valve sclerosis - 1.6 m/s.    Herniated disc     Hypercholesterolemia 11/17/2016    2004: Began statin.    Hypertension     Hypertension 11/17/2016    1990: Diagnosed.    Lipids abnormal     OA (osteoarthritis)     Osteopenia     Peripheral artery disease 12/1/2016    10/25/2016: Arterial Duplex: Right: SFA: Distal 1.8 m/s. Left: No " obstructive disease above knee.    Stroke     2004       Past Surgical History:   Procedure Laterality Date    BLADDER TUMOR EXCISION      CAROTID ENDARTERECTOMY      Left    EYE SURGERY      bilateral kory    HERNIA REPAIR      knee scope Right     SKIN BIOPSY      ca    TONSILLECTOMY      tumors removed chest Bilateral        Subjective     Chief Complaint: R knee pain  Patient/Family stated goals: home with HH  Communicated with: nurse prior to session.  Pain/Comfort:  · Pain Rating 1: 7/10  · Location - Side 1: Right  · Location - Orientation 1: generalized  · Location 1: knee  · Pain Addressed 1: Pre-medicate for activity, Reposition, Distraction, Cessation of Activity, Nurse notified  · Pain Rating Post-Intervention 1: 7/10    Objective:     Patient found with:  (polar ice R knee)    General Precautions: Standard, fall   Orthopedic Precautions:N/A   Braces: N/A     Occupational Performance:    Bed Mobility:    · Patient completed Scooting/Bridging with contact guard assistance  · Patient completed Supine to Sit with minimum assistance    Functional Mobility/Transfers:  · Patient completed Sit <> Stand Transfer with moderate assistance  with  rolling walker   · Patient completed Bed <> Chair Transfer using Step Transfer technique with minimum assistance with rolling walker  · Patient completed Toilet Transfer Step Transfer technique with minimum assistance with  bedside commode    Activities of Daily Living:  · Feeding:  independence bed level  · Grooming: supervision seated BS chair  · LB Dressing: moderate assistance L sock SBA; R sock total assist;  underwear threading R foot and cga pulling garment over hips standing with RW  · Toileting: minimum assistance with RW for clothes management    Cognitive/Visual Perceptual:  Cognitive/Psychosocial Skills:     -       Oriented to: Person, Place, Time and Situation   -       Follows Commands/attention:Follows one-step commands  -       Communication:  "clear/fluent  -       Memory: No Deficits noted  -       Safety awareness/insight to disability: impaired   -       Mood/Affect/Coping skills/emotional control: Appropriate to situation  Visual/Perceptual:      -Intact    Physical Exam:  Balance:    -       staic sitting;'  fair plus-rounded shoulders and forward trunk flexion  Postural examination/scapula alignment:    -       Rounded shoulders  -       Forward head  -       Abnormal trunk flexion  Skin integrity: Visible skin intact  Edema:  Mild R knee and Moderate same   Sensation:    -       Intact  Dominant hand:    -       right  Upper Extremity Range of Motion:     -       Right Upper Extremity: WFL  -       Left Upper Extremity: WFL  Upper Extremity Strength:    -       Right Upper Extremity: WFL  -       Left Upper Extremity: WFL   Strength:    -       Right Upper Extremity: WFL  -       Left Upper Extremity: WFL    Patient left up in chair with all lines intact, call button in reach, nurse and PT; spouse notified and spouse present    AMPA 6 Click:  [facility] Total Score: 17    Treatment & Education:  Hip kit  Education with pictures and purchase options;  Bed transfers with step by step instruction for  Supine >sit with assist for RLE 2/2 pain in knee; BSC stand step with RW and min assist  Education:    Assessment:     Alan Gutiérrez is a 77 y.o. male with a medical diagnosis of Cellulitis of right knee.  He presents with R knee pain . Would benefit from HHOT/HHPT. DME needs for home:  Hip kit and BSc.  Continue with OT POC..  Performance deficits affecting function are weakness, impaired functional mobilty, pain, decreased ROM, gait instability, impaired endurance, impaired self care skills, decreased safety awareness, impaired balance, edema.      Rehab Prognosis:  good; patient would benefit from acute skilled OT services to address these deficits and reach maximum level of function.         Clinical Decision Makin.  OT Low:  "Pt " "evaluation falls under low complexity for evaluation coding due to performance deficits noted in 1-3 areas as stated above and 0 co-morbities affecting current functional status. Data obtained from problem focused assessments. No modifications or assistance was required for completion of evaluation. Only brief occupational profile and history review completed."     Plan:     Patient to be seen 5 x/week to address the above listed problems via self-care/home management, therapeutic activities, therapeutic exercises  · Plan of Care Expires: 01/24/18  · Plan of Care Reviewed with: patient, spouse    This Plan of care has been discussed with the patient who was involved in its development and understands and is in agreement with the identified goals and treatment plan    GOALS:    Occupational Therapy Goals        Problem: Occupational Therapy Goal    Goal Priority Disciplines Outcome Interventions   Occupational Therapy Goal     OT, PT/OT Ongoing (interventions implemented as appropriate)    Description:  Goals to be met by: 1/10/2018    Patient will increase functional independence with ADLs by performing:    LE Dressing with Contact Guard Assistance.  Toileting from bedside commode with Stand-by Assistance for hygiene and clothing management.   Supine to sit with Supervision.  Stand pivot transfers with Stand-by Assistance.  Toilet transfer to bedside commode with Stand-by Assistance.                      Time Tracking:     OT Date of Treatment: 01/11/18  OT Start Time: 1113  OT Stop Time: 1151  OT Total Time (min): 38 min    Billable Minutes:Evaluation 15  Self Care/Home Management 10  Therapeutic Activity 13  Total Time 38    Lakeshia Bailey OT  1/11/2018    "

## 2018-01-11 NOTE — ASSESSMENT & PLAN NOTE
- Cardiology noted:   2010: Diagnosed with permanent atrial fibrillation.              CHADS2=4 (CHAS2). [id number]=9 ([id number])              On warfarin.              On metoprolol 25 mg Q24 and digoxin 0.25 mg Q24.              Rate appear well controlled.

## 2018-01-11 NOTE — ASSESSMENT & PLAN NOTE
- Monitor  - Afebrile, WBC WNL now.  - Blood culture and knee fluid culture 1/7/18 no growth so far.   - ID noted:     - resolved  - doubt septic arthritis  - seems more likely a resolving hemarthrosis  - culture is pending  - if sterile, consider changing to po doxycycline for prophylaxis until blood is reabsorbed   - Discussed with Dr. Anders and if culture remains no growth would prescribe doxycycline for 2 week limit with prompt orthopedic follow up, but noting that recommendation is for doxycycline prophylaxis until blood is reabsorbed.

## 2018-01-11 NOTE — NURSING
Removed PIV, catheter tip intact. Dressing applied. New orders for discharge noted, pt and family agreeable to discharge. Discharge teaching, appointments and meds reviewed with patient and wife. [facility] to fill scripts and bring up to patient. Case management made arrangements for homehealth and supplies needed at discharge. Will d/c per w/c.

## 2018-01-11 NOTE — PLAN OF CARE
CM faxed order for BSC and short hip kit to [facility]. Per Kirit Han hip kit can be delivered with the bsc but will need to be paid for at time of delivery. Pt and spouse informed and are in agreeable with this plan. Equipment tonight.

## 2018-01-11 NOTE — SUBJECTIVE & OBJECTIVE
Interval History: Doing well. Rash noted - better today. Knee improved but still with some pain.    Review of Systems   Constitutional: Negative for chills and fever.   Musculoskeletal: Positive for arthralgias.   Skin: Positive for rash.   All other systems reviewed and are negative.    Objective:     Vital Signs (Most Recent):  Temp: 97.3 °F (36.3 °C) (01/11/18 0741)  Pulse: 77 (01/11/18 0741)  Resp: 18 (01/11/18 0741)  BP: (!) 173/92 (01/11/18 0741)  SpO2: 96 % (01/11/18 0741) Vital Signs (24h Range):  Temp:  [97.3 °F (36.3 °C)-97.9 °F (36.6 °C)] 97.3 °F (36.3 °C)  Pulse:  [73-97] 77  Resp:  [18] 18  SpO2:  [96 %-99 %] 96 %  BP: (130-173)/(66-92) 173/92     Weight: 80.5 kg (177 lb 6.4 oz)  Body mass index is 26.97 kg/m².    Estimated Creatinine Clearance: 58 mL/min (based on SCr of 1 mg/dL).    Physical Exam   Constitutional: He is oriented to person, place, and time. He appears well-developed and well-nourished.   HENT:   Head: Normocephalic.   Eyes: Pupils are equal, round, and reactive to light.   Neurological: He is alert and oriented to person, place, and time.   Skin: Skin is warm and dry. Rash noted. He is not diaphoretic.   Nursing note and vitals reviewed.      Significant Labs:   Wound Culture:   Recent Labs  Lab 01/08/18  1304   LABAERO No growth       Significant Imaging: I have reviewed all pertinent imaging results/findings within the past 24 hours.

## 2018-01-11 NOTE — SUBJECTIVE & OBJECTIVE
Interval History:   Doing okay, has right knee pain as previous.  States was OOB.  Discussed POC fully with wife.    Review of Systems   Constitutional: Negative for appetite change and fever.   Respiratory: Negative for cough and shortness of breath.    Cardiovascular: Negative for chest pain and palpitations.   Gastrointestinal: Negative for nausea and vomiting.   Musculoskeletal: Positive for joint swelling.        Right knee pain   Skin: Negative for color change and rash.   Neurological: Negative for dizziness and weakness.   Psychiatric/Behavioral: Negative for agitation and confusion.     Objective:     Vital Signs (Most Recent):  Temp: 97.3 °F (36.3 °C) (01/11/18 0741)  Pulse: 77 (01/11/18 0741)  Resp: 18 (01/11/18 0741)  BP: (!) 173/92 (01/11/18 0741)  SpO2: 96 % (01/11/18 0741) Vital Signs (24h Range):  Temp:  [97.3 °F (36.3 °C)-97.9 °F (36.6 °C)] 97.3 °F (36.3 °C)  Pulse:  [73-97] 77  Resp:  [18] 18  SpO2:  [96 %-99 %] 96 %  BP: (130-173)/(66-92) 173/92     Weight: 80.5 kg (177 lb 6.4 oz)  Body mass index is 26.97 kg/m².    Intake/Output Summary (Last 24 hours) at 01/11/18 1459  Last data filed at 01/11/18 0400   Gross per 24 hour   Intake                0 ml   Output              350 ml   Net             -350 ml      Physical Exam    Significant Labs:   BMP:   Recent Labs  Lab 01/10/18  0537      *   K 3.7      CO2 30*   BUN 17   CREATININE 1.0   CALCIUM 8.4*   MG 2.1     CBC:   Recent Labs  Lab 01/10/18  0537   WBC 9.71   HGB 10.3*   HCT 31.1*          Significant Imaging: I have reviewed all pertinent imaging results/findings within the past 24 hours.

## 2018-01-11 NOTE — PLAN OF CARE
[facility]     Department of Hospital Medicine     [address] [place] [address]     [phone]       HOME  HEALTH ORDERS      01/11/2018    Admit to Home Health    Diagnoses:  Active Hospital Problems    Diagnosis  POA    *Cellulitis of right knee [L03.115]  Yes    Rash of back [R21]  No    Leukocytosis [D72.829]  Yes    Heart failure, systolic and diastolic, chronic [I50.42]  Yes     8/1/2016: Echo: Normal left ventricular size with mild systolic dysfunction. EF 45%. Markedly dilated LA. Moderate aortic valve sclerosis - 1.6 m/s.      Hypercholesterolemia [E78.00]  Yes     2004: Began statin.      Hypertension [I10]  Yes     1990: Diagnosed.      Chronic anticoagulation [Z79.01]  Not Applicable     2010: Diagnosed with permanent atrial fibrillation.  CHADS2=4 (CHAS2). [id number]=9 (RQX2L4V)  On warfarin. Anticoagulation managed by Dr. Pressley.      Atrial fibrillation [I48.91]  Yes     2010: Diagnosed with permanent atrial fibrillation.  CHADS2=4 (CHAS2). [id number]=1 (MCD2A9T)  On warfarin.        Resolved Hospital Problems    Diagnosis Date Resolved POA   No resolved problems to display.       Patient is homebound due to:  Cellulitis of right knee    Allergies:Review of patient's allergies indicates:  No Known Allergies    Diet:  Regular    Acitivities: As tolerated    Nursing:   SN to complete comprehensive assessment including routine vital signs. Instruct on disease process and s/s of complications to report to MD. Review/verify medication list sent home with the patient at time of discharge  and instruct patient/caregiver as needed. Frequency may be adjusted depending on start of care date.    Notify MD if SBP > 160 or < 90; DBP > 90 or < 50; HR > 120 or < 50; Temp > 101; Other:  Severe right knee pain or swelling.    CONSULTS:     Physical Therapy to evaluate and treat. Evaluate for home safety and equipment needs; Establish/upgrade home exercise program.  Perform / instruct on therapeutic exercises, gait training, transfer training, and Range of Motion.    Occupational Therapy to evaluate and treat. Evaluate home environment for safety and equipment needs. Perform/Instruct on transfers, ADL training, ROM, and therapeutic exercises.    Aide to provide assistance with personal care, ADLs, and vital signs    Medications: Review discharge medications with patient and family and provide education.         Alan Gutiérrez   Home Medication Instructions ELHAM:[id number]    Printed on:01/11/18 6445   Medication Information                      allopurinol (ZYLOPRIM) 100 MG tablet  Take 100 mg by mouth once daily.             apixaban 5 mg Tab  Take 1 tablet (5 mg total) by mouth 2 (two) times daily.  - Start Saturday, 1/13/18.             ascorbic acid (VITAMIN C) 100 MG tablet  Take 100 mg by mouth once daily.             calcium citrate (CALCITRATE) 200 mg (950 mg) tablet  Take 1 tablet by mouth once daily.             cloNIDine (CATAPRES) 0.1 MG tablet  take one tablet by mouth every 8 hours at 7 am, 1 pm, and 9 pm             co-enzyme Q-10 50 mg capsule  Take 50 mg by mouth once daily.             digoxin (LANOXIN) 125 mcg tablet  TAKE 1 TABLET ONE TIME DAILY             doxycycline (MONODOX) 100 MG capsule  Take 1 capsule (100 mg total) by mouth every 12 (twelve) hours.             finasteride (PROSCAR) 5 mg tablet  Take 5 mg by mouth once daily.             fish oil-omega-3 fatty acids 300-1,000 mg capsule  Take 2 g by mouth once daily.             furosemide (LASIX) 40 MG tablet  Take 1 tablet (40 mg total) by mouth once daily.             gabapentin (NEURONTIN) 100 MG capsule  Take 200 mg by mouth 3 (three) times daily.             glucosamine-chondroitin 500-400 mg tablet  Take 1 tablet by mouth 3 (three) times daily.             hydrocodone-acetaminophen 5-325mg (NORCO) 5-325 mg per tablet  Take 1 tablet by mouth every 8 (eight) hours as needed for Pain.              influenza (FLUZONE HIGH-DOSE 2017-18, PF,) 180 mcg/0.5 mL vaccine  Inject 0.5 mLs into the muscle once. a year             losartan (COZAAR) 100 MG tablet  Take 100 mg by mouth once daily.             metoprolol succinate (TOPROL-XL) 25 MG 24 hr tablet  TAKE 1 TABLET ONE TIME DAILY             MULTIVITAMIN W-MINERALS/LUTEIN (CENTRUM SILVER ORAL)  Take 1 tablet by mouth once daily.              NIFEdipine (PROCARDIA-XL) 30 MG (OSM) 24 hr tablet  Take 30 mg by mouth every morning.             NIFEdipine (PROCARDIA-XL) 60 MG (OSM) 24 hr tablet  Take 60 mg by mouth every evening.             potassium chloride (MICRO-K) 10 MEQ CpSR  Take 10 mEq by mouth once daily.              rosuvastatin (CRESTOR) 40 MG Tab  Take 40 mg by mouth once daily.             vitamin D 1000 units Tab  Take 185 mg by mouth once daily.                       _________________________________  Michael Boston MD  01/11/2018

## 2018-01-11 NOTE — DISCHARGE SUMMARY
"[facility] Hospital Medicine  Discharge Summary      Patient Name: Alan Gutiérrez  MRN: [id number]  Admission Date: 1/7/2018  Hospital Length of Stay: 0 days  Discharge Date and Time:  01/11/2018 4:15 PM  Attending Physician: Michael Boston MD   Discharging Provider: Michael Boston MD  Primary Care Provider: Dave Luna Sr, MD      HPI:   Patient is a 77 y.o. male who presents to the ED with complaint of right knee pain. He has had intermittent chronic right knee pain for over twenty years s/p arthroscopic knee surgery, and is followed by Dr. Gonzalez, orthopedic surgery. Wife reports onset of current episode of knee pain two weeks ago, and states "this is the worst it has ever been." Patient woke up unable to walk or bear weight secondary to pain two weeks ago. He was seen by Dr. Gonzalez at that time and reportedly had fluid drained and was given a cortisone shot with temporary improvement. 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 Gonzalez's PA two days ago and had fluid drained and was given cortisone injection again without relief. He was seen here yesterday for intractable pain, and had a dry tap. He reports temporary relief to pain with morphine yesterday. Patient reports worsening of pain since being discharged yesterday, and currently complains of pain and swelling of the knee. Pain radiates from the knee into the distal thigh and is worse with movement. He is unable to bear weight secondary to pain. He denies fever, nausea, or vomiting. He has an appointment with physical therapy in two days, and is "starting the process of having the knee replaced." He has no additional complaints.    * No surgery found *      Hospital Course:   No notes on file     Consults:   Consults         Status Ordering Provider     Inpatient consult to Cardiology  Once     Provider:  Zachary Pressley MD    Completed LARSEN, " LYNNE LALA     Inpatient consult to Cardiology  Once     Provider:  Zachary Pressley MD    Acknowledged RASHAD WALTER.     Inpatient consult to Infectious Diseases  Once     Provider:  Obi Anders MD    Completed RASHAD WALTER.     Inpatient consult to Nephrology-[facility]  Once     Provider:  Cathy Cifuentes MD    Completed RASHAD WALTER.     Inpatient consult to Orthopedic Surgery  Once     Provider:  Nicholas Gonzalez MD    Acknowledged RASHAD WALTER.          * Cellulitis of right knee    - Did not appreciated much cellulitis by time seen.   - Right knee join with swelling and warmth. WBC elevated. Afebrile  - Vancomycin/Rocephin 3 days in hospital.   .  - Right knee fluid culture and blood culture no growth at 3 days.  Will change to oral doxycycline.   - Orthopedics consulted.  Discussed with Dr. Gonzalez.  Dr. Gonzalez feels patient may have a hemarthrosis as patient on coumadin with elevated INR, and that it will likely resorb.  Discussed with Dr. Anders and ultimately no MRI obtained.  US of RLE showed no evidence DVT.   - Plan to discussed with radiology to determine optimal imaging modality to characterize.   - ESR and CRP elevated  - Follow up with PCP Dr. Luna.             Rash of back    - Noted today.  - May be related to antibiotic vs other med, vs heat rash?  - Mild.  - Gave Benadryl dose and prednisone 10 day before discharge, still present on back.  Discussed with Dr. Anders, and agree may be heat rash or related to contact.  Not present on front torso.   - Discussed with wife would expect to resolve.               Leukocytosis    - Monitor  - Afebrile, WBC WNL now.  - Blood culture and knee fluid culture 1/7/18 no growth so far.   - ID noted:     - resolved  - doubt septic arthritis  - seems more likely a resolving hemarthrosis  - culture is pending  - if sterile, consider changing to po doxycycline for prophylaxis until blood is reabsorbed   - Discussed with Dr. Anders and if culture remains no  growth would prescribe doxycycline for 2 week limit with prompt orthopedic follow up, but noting that recommendation is for doxycycline prophylaxis until blood is reabsorbed.          Heart failure, systolic and diastolic, chronic    - Appears stable currently  - Dr. Pressley followed.                Hypercholesterolemia    - Continue Crestor          Hypertension    - Continue clonidine, nifedipine, toprol          Chronic anticoagulation    - Cardiology following  - INR daily  - Discussed with Dr. Pressley and given concern for hemarthrosis, would start Eliquis in 2 days.  - Given one DVT prophylaxis dose Lovenox prior to discharge.              Atrial fibrillation    - Cardiology noted:   2010: Diagnosed with permanent atrial fibrillation.              CHADS2=4 (CHAS2). [id number]=3 ([id number])              On warfarin.  Will change to Eliquis after discharge.               On metoprolol 25 mg Q24 and digoxin 0.25 mg Q24.              Rate appear well controlled.  - Discussed with Dr. Presslye and will change to Eliquis 5 mg BID after discharge.   - Discussed with Dr. Pressley, and discussed with wife to begin Eliquis on Saturday, 1/13/18, as there was concern for hemarthrosis.   - Wife expressed understanding.           Discussed with wife fully plan of care, including to start Eliquis in two day, and she expressed understanding.  All questions answered to satisfaction.       Final Active Diagnoses:    Diagnosis Date Noted POA    PRINCIPAL PROBLEM:  Cellulitis of right knee [L03.115] 01/07/2018 Yes    Rash of back [R21] 01/10/2018 No    Leukocytosis [D72.829] 01/09/2018 Yes    Heart failure, systolic and diastolic, chronic [I50.42] 11/17/2016 Yes    Hypercholesterolemia [E78.00] 11/17/2016 Yes    Hypertension [I10] 11/17/2016 Yes    Chronic anticoagulation [Z79.01] 08/28/2014 Not Applicable    Atrial fibrillation [I48.91] 05/22/2013 Yes      Problems Resolved During this Admission:    Diagnosis Date  "Noted Date Resolved POA       Discharged Condition: good    Disposition: Home or Self Care    Follow Up:  Follow-up Information     Nicholas Gonzalez MD. Schedule an appointment as soon as possible for a visit in 1 week.    Specialty:  Orthopedic Surgery  Contact information:  [address]  [facility]  [facility] [id number] [phone]             Zachary Pressley MD. Schedule an appointment as soon as possible for a visit in 5 days.    Specialty:  Cardiology  Contact information:  [address]  [facility] [id number] [phone]             Dave Luna Sr, MD. Schedule an appointment as soon as possible for a visit in 5 days.    Specialty:  Nephrology  Contact information:  [address] [facility]  [address]  [facility] [address] [phone]             Obi Anders MD. Schedule an appointment as soon as possible for a visit in 1 week.    Specialties:  Infectious Diseases, Hospice and Palliative Medicine  Contact information:  [address]  [facility] [address] [phone]             Call [facility].    Specialties:  DME Provider, Home Health Services  Why:  With questions regarding RN follow up 01/12/18  Contact information:  [address] [facility] [id number] [phone]                 Patient Instructions:     COMMODE FOR HOME USE   Order Specific Question Answer Comments   Type: Standard    Height: 5' 8" (1.727 m)    Weight: 80.5 kg (177 lb 6.4 oz)    Does patient have medical equipment at home? walker, rolling shower chair on wheels / shower chair on wheels   Does patient have medical equipment at home? wheelchair    Length of need (1-99 months): 99      HIP KIT FOR HOME USE   Order Specific Question Answer Comments   Height: 5' 8" (1.727 m)    Weight: 80.5 kg (177 lb 6.4 oz)    Does patient have medical equipment at home? shower chair    Does patient have medical equipment at home? grab bar    Does patient have medical equipment at " home? wheelchair    Does patient have medical equipment at home? walker, rolling    Length of need (1-99 months): 99    Type: [place] Hip Kit      AMB REFERRAL TO INFECTIOUS DISEASE   Referral Priority: Routine Referral Type: Consultation   Referral Reason: Specialty Services Required    Referred to Provider: JERI KOROMA Requested Specialty: Infectious Diseases   Number of Visits Requested: 1      Activity as tolerated     Notify your health care provider if you experience any of the following:  increased confusion or weakness     Notify your health care provider if you experience any of the following:  persistent dizziness, light-headedness, or visual disturbances     Notify your health care provider if you experience any of the following:  worsening rash     Notify your health care provider if you experience any of the following:  severe persistent headache     Notify your health care provider if you experience any of the following:  difficulty breathing or increased cough     Notify your health care provider if you experience any of the following:  redness, tenderness, or signs of infection (pain, swelling, redness, odor or green/yellow discharge around incision site)     Notify your health care provider if you experience any of the following:  severe uncontrolled pain     Notify your health care provider if you experience any of the following:  persistent nausea and vomiting or diarrhea     Notify your health care provider if you experience any of the following:  temperature >100.4         Significant Diagnostic Studies:     Imaging Results          US Lower Extremity Veins Right (Final result)  Result time 01/11/18 15:50:36    Final result by Seferino Nielson MD (01/11/18 15:50:36)                 Impression:      No sonographic evidence for deep venous thrombosis within the right lower extremity.      Electronically signed by: SEFERINO NIELSON MD  Date:     01/11/18  Time:    15:50               Narrative:    Sonographic evaluation of the deep veins of the right lower extremity was performed using grayscale, color flow, and duplex Doppler ultrasound.  The right common femoral, proximal greater saphenous, proximal deep femoral, femoral, popliteal, peroneal, posterior tibial, and anterior tibial veins were evaluated using a combination of graded compression and augmentation techniques.  These veins are noted to be patent.  There is no sonographic evidence for deep venous thrombosis within the right lower extremity.                              Pending Diagnostic Studies:     None         Medications:  Reconciled Home Medications:   Current Discharge Medication List      START taking these medications    Details   apixaban 5 mg Tab Take 1 tablet (5 mg total) by mouth 2 (two) times daily.  Qty: 60 tablet, Refills: 0    Associated Diagnoses: Chronic atrial fibrillation      doxycycline (MONODOX) 100 MG capsule Take 1 capsule (100 mg total) by mouth every 12 (twelve) hours.  Qty: 28 capsule, Refills: 0    Associated Diagnoses: Hemarthrosis involving knee joint, right         CONTINUE these medications which have CHANGED    Details   hydrocodone-acetaminophen 5-325mg (NORCO) 5-325 mg per tablet Take 1 tablet by mouth every 8 (eight) hours as needed for Pain.  Qty: 30 tablet, Refills: 0    Associated Diagnoses: Acute pain of right knee         CONTINUE these medications which have NOT CHANGED    Details   allopurinol (ZYLOPRIM) 100 MG tablet Take 100 mg by mouth once daily.      ascorbic acid (VITAMIN C) 100 MG tablet Take 100 mg by mouth once daily.      calcium citrate (CALCITRATE) 200 mg (950 mg) tablet Take 1 tablet by mouth once daily.      cloNIDine (CATAPRES) 0.1 MG tablet take one tablet by mouth every 8 hours at 7 am, 1 pm, and 9 pm  Refills: 1      digoxin (LANOXIN) 125 mcg tablet TAKE 1 TABLET ONE TIME DAILY  Qty: 90 tablet, Refills: 3    Associated Diagnoses: Permanent atrial fibrillation       finasteride (PROSCAR) 5 mg tablet Take 5 mg by mouth once daily.      fish oil-omega-3 fatty acids 300-1,000 mg capsule Take 2 g by mouth once daily.      furosemide (LASIX) 40 MG tablet Take 1 tablet (40 mg total) by mouth once daily.  Qty: 30 tablet, Refills: 11      gabapentin (NEURONTIN) 100 MG capsule Take 200 mg by mouth 3 (three) times daily.      glucosamine-chondroitin 500-400 mg tablet Take 1 tablet by mouth 3 (three) times daily.      losartan (COZAAR) 100 MG tablet Take 100 mg by mouth once daily.      metoprolol succinate (TOPROL-XL) 25 MG 24 hr tablet TAKE 1 TABLET ONE TIME DAILY  Qty: 90 tablet, Refills: 3    Associated Diagnoses: Permanent atrial fibrillation      !! NIFEdipine (PROCARDIA-XL) 30 MG (OSM) 24 hr tablet Take 30 mg by mouth every morning.      !! NIFEdipine (PROCARDIA-XL) 60 MG (OSM) 24 hr tablet Take 60 mg by mouth every evening.      potassium chloride (MICRO-K) 10 MEQ CpSR Take 10 mEq by mouth once daily.       rosuvastatin (CRESTOR) 40 MG Tab Take 40 mg by mouth once daily.      vitamin D 1000 units Tab Take 185 mg by mouth once daily.      co-enzyme Q-10 50 mg capsule Take 50 mg by mouth once daily.      influenza (FLUZONE HIGH-DOSE 2017-18, PF,) 180 mcg/0.5 mL vaccine Inject 0.5 mLs into the muscle once. a year      MULTIVITAMIN W-MINERALS/LUTEIN (CENTRUM SILVER ORAL) Take 1 tablet by mouth once daily.        !! - Potential duplicate medications found. Please discuss with provider.      STOP taking these medications       warfarin (COUMADIN) 5 MG tablet Comments:   Reason for Stopping:               Indwelling Lines/Drains at time of discharge:   Lines/Drains/Airways          No matching active lines, drains, or airways          Time spent on the discharge of patient: > 30 minutes  Patient was seen and examined on the date of discharge and determined to be suitable for discharge.         Michael Boston MD  Department of Hospital Medicine  [facility]

## 2018-01-11 NOTE — ASSESSMENT & PLAN NOTE
- Cardiology following  - INR daily  - Discussed with Dr. Pressley and given concern for hemarthrosis, would start Eliquis in 2 days.  - Place on DVT prophylaxis dose Lovenox.

## 2018-01-12 ENCOUNTER — PATIENT OUTREACH (OUTPATIENT)
Dept: ADMINISTRATIVE | Facility: CLINIC | Age: 78
End: 2018-01-12

## 2018-01-12 LAB — BACTERIA SPEC AEROBE CULT: NO GROWTH

## 2018-01-12 NOTE — PROGRESS NOTES
Please forward this important TCC information to your provider in order to maximize the post discharge care delivery of this patient.    C3 nurse spoke with the wife of  Alan Gutiérrez  for a TCC post hospital discharge follow up call. The patient has a scheduled HOSFU appointment with Dave Luna Sr, MD on 1\16\18 @ 2293.  Respectfully,  Melody Chen RN  [facility]    [email or web address]       Please do not reply to this message, as this inbox is not routinely monitored.

## 2018-01-12 NOTE — PROGRESS NOTES
Physical Therapy Discharge Summary    Name: Alan Gutiérrez  MRN: [id number]   Principal Problem: Cellulitis of right knee     Patient Discharged from acute Physical Therapy on 18.  Please refer to prior PT noted date on 18 for functional status.     Assessment:     Patient appropriate for care in another setting.    Objective:     GOALS:    Physical Therapy Goals     Not on file          Multidisciplinary Problems (Resolved)        Problem: Physical Therapy Goal    Goal Priority Disciplines Outcome Goal Variances Interventions   Physical Therapy Goal   (Resolved)     PT/OT, PT Outcome(s) achieved     Description:  Goals to be met by: 18     Patient will increase functional independence with mobility by performin. Sit<>stand transfer with supervision using rolling walker.   2. Gait > 15 feet with SBA using rolling walker.   3. Ascend/descend curb step with CGA with rolling walker.                      Reasons for Discontinuation of Therapy Services  Transfer to alternate level of care.      Plan:     Patient Discharged to: Home with Home Health Service.    Yifan Guerra, PT  2018

## 2018-01-12 NOTE — PATIENT INSTRUCTIONS
Discharge Instructions for Cellulitis  You have been diagnosed with cellulitis. This is an infection in the deepest layer of the skin. In some cases, the infection also affects the muscle. Cellulitis is caused by bacteria. The bacteria can enter the body through broken skin. This can happen with a cut, scratch, animal bite, or an insect bite that has been scratched. You may have been treated in the hospital with antibiotics and fluids. You will likely be given a prescription for antibiotics to take at home. This sheet will help you take care of yourself at home.  Home care  When you are home:  · Take the prescribed antibiotic medicine you are given as directed until it is gone. Take it even if you feel better. It treats the infection and stops it from returning. Not taking all the medicine can make future infections hard to treat.  · Keep the infected area clean.  · When possible, raise the infected area above the level of your heart. This helps keep swelling down.  · Talk with your healthcare provider if you are in pain. Ask what kind of over-the-counter medicine you can take for pain.  · Apply clean bandages as advised.  · Take your temperature once a day for a week.  · Wash your hands often to prevent spreading the infection.  In the future, wash your hands before and after you touch cuts, scratches, or bandages. This will help prevent infection.   When to call your healthcare provider  Call your healthcare provider immediately if you have any of the following:  · Difficulty or pain when moving the joints above or below the infected area  · Discharge or pus draining from the area  · Fever of 100.4°F (38°C) or higher, or as directed by your healthcare provider  · Pain that gets worse in or around the infected   · Redness that gets worse in or around the infected area, particularly if the area of redness expands to a wider area  · Shaking chills  · Swelling of the infected area  · Vomiting   Date Last Reviewed:  8/1/2016  © 9890-8324 [employer]. [address] [place] [address], [place] [address]. All rights reserved. This information is not intended as a substitute for professional medical care. Always follow your healthcare professional's instructions.

## 2018-01-12 NOTE — PT/OT/SLP DISCHARGE
Occupational Therapy Discharge Summary    Alan Gutiérrez  MRN: [id number]   Principal Problem: Cellulitis of right knee      Patient Discharged from acute Occupational Therapy on 01/11/18.  Please refer to prior OT note dated 01/11/18 for functional status.    Assessment:      Patient appropriate for care in another setting.    Objective:     GOALS:    Occupational Therapy Goals     Not on file          Multidisciplinary Problems (Resolved)        Problem: Occupational Therapy Goal    Goal Priority Disciplines Outcome Interventions   Occupational Therapy Goal   (Resolved)     OT, PT/OT Outcome(s) achieved    Description:  Goals to be met by: 1/10/2018    Patient will increase functional independence with ADLs by performing:    LE Dressing with Contact Guard Assistance.  Toileting from bedside commode with Stand-by Assistance for hygiene and clothing management.   Supine to sit with Supervision.  Stand pivot transfers with Stand-by Assistance.  Toilet transfer to bedside commode with Stand-by Assistance.                      Reasons for Discontinuation of Therapy Services  Transfer to alternate level of care.      Plan:     Patient Discharged to: Home with Home Health Service     OT of record unavailable for documentation.    PRUDENCIO Dover  1/12/2018

## 2018-01-13 LAB
BACTERIA BLD CULT: NORMAL
BACTERIA BLD CULT: NORMAL

## 2018-01-15 ENCOUNTER — OFFICE VISIT (OUTPATIENT)
Dept: CARDIOLOGY | Facility: CLINIC | Age: 78
End: 2018-01-15
Attending: INTERNAL MEDICINE
Payer: MEDICARE

## 2018-01-15 VITALS
HEIGHT: 68 IN | SYSTOLIC BLOOD PRESSURE: 134 MMHG | HEART RATE: 97 BPM | WEIGHT: 172 LBS | DIASTOLIC BLOOD PRESSURE: 85 MMHG | BODY MASS INDEX: 26.07 KG/M2

## 2018-01-15 DIAGNOSIS — I48.20 CHRONIC ATRIAL FIBRILLATION: ICD-10-CM

## 2018-01-15 DIAGNOSIS — Z86.73 HISTORY OF CEREBROVASCULAR ACCIDENT: ICD-10-CM

## 2018-01-15 DIAGNOSIS — I73.9 PERIPHERAL ARTERY DISEASE: ICD-10-CM

## 2018-01-15 DIAGNOSIS — I48.21 PERMANENT ATRIAL FIBRILLATION: ICD-10-CM

## 2018-01-15 DIAGNOSIS — M25.00 HEMARTHROSIS: ICD-10-CM

## 2018-01-15 DIAGNOSIS — I50.42 HEART FAILURE, SYSTOLIC AND DIASTOLIC, CHRONIC: ICD-10-CM

## 2018-01-15 DIAGNOSIS — I10 ESSENTIAL HYPERTENSION: ICD-10-CM

## 2018-01-15 DIAGNOSIS — I65.23 BILATERAL CAROTID ARTERY STENOSIS: ICD-10-CM

## 2018-01-15 DIAGNOSIS — E78.00 HYPERCHOLESTEROLEMIA: ICD-10-CM

## 2018-01-15 DIAGNOSIS — Z79.01 CHRONIC ANTICOAGULATION: ICD-10-CM

## 2018-01-15 PROCEDURE — 93000 ELECTROCARDIOGRAM COMPLETE: CPT | Mod: S$GLB,,, | Performed by: INTERNAL MEDICINE

## 2018-01-15 PROCEDURE — 99214 OFFICE O/P EST MOD 30 MIN: CPT | Mod: 25,S$GLB,, | Performed by: INTERNAL MEDICINE

## 2018-01-15 RX ORDER — METOPROLOL SUCCINATE 50 MG/1
50 TABLET, EXTENDED RELEASE ORAL DAILY
Qty: 90 TABLET | Refills: 3 | Status: SHIPPED | OUTPATIENT
Start: 2018-01-15 | End: 2018-11-27 | Stop reason: SDUPTHER

## 2018-01-15 NOTE — PROGRESS NOTES
Subjective:     Alan Gutiérrez is a 77 y.o. male with hypertension and hypercholesterolemia. He is overweight. He has a history of a cerebrovascular accident in 2004 and then had right carotid endarterectomy. He was diagnosed with atrial fibrillation in 2010. He has CHADS2 score of 5 (CHAS2) and a [id number] score of 7 ([id number]) being on warfarin. He has well compensated systolic as well as diastolic heart failure. He underwent left CEA on 9/12/2014. He has received DRAKE injection which has helped his back pain. He has been bothered by swelling of especially the left ankle which has improved after he began wrapping the legs. He presented with severe pain in the right knee on 1/7/2018 and had hemarthrosis of the right knee in the setting of having had an elevated INR and a knee injection. He denies any chest pain or dyspnea. No palpitations or weak spells. No bleeding.    Congestive Heart Failure   Presents for follow-up visit. Pertinent negatives include no abdominal pain, claudication, edema, fatigue, near-syncope, nocturia, orthopnea, paroxysmal nocturnal dyspnea or unexpected weight change. The symptoms have been stable.   Atrial Fibrillation   Presents for follow-up visit. Symptoms are negative for bradycardia, hemodynamic instability, hypotension, syncope, tachycardia and weakness. The symptoms have been stable. Past medical history includes atrial fibrillation.   Cerebrovascular Accident   The current episode started today. The problem has been unchanged. Pertinent negatives include no abdominal pain, anorexia, arthralgias, change in bowel habit, chills, congestion, coughing, diaphoresis, fatigue, fever, headaches, joint swelling, myalgias, nausea, neck pain, numbness, rash, sore throat, swollen glands, urinary symptoms, vertigo, visual change, vomiting or weakness.   Hypertension   This is a chronic problem. The current episode started today. The problem is unchanged. The problem is uncontrolled (usually  120-140/70-80 mmHg at home). Pertinent negatives include no anxiety, blurred vision, headaches, malaise/fatigue, neck pain, orthopnea, peripheral edema, PND or sweats. There is no history of chronic renal disease.   Hyperlipidemia   This is a chronic problem. The current episode started more than 1 year ago. The problem is controlled. Recent lipid tests were reviewed and are normal. He has no history of chronic renal disease, diabetes, hypothyroidism, liver disease or nephrotic syndrome. Pertinent negatives include no focal sensory loss, focal weakness, leg pain or myalgias.       Review of Systems   Constitution: Negative for chills, diaphoresis, fatigue, fever, weakness, malaise/fatigue and unexpected weight change.   HENT: Negative for congestion and sore throat.    Eyes: Negative for blurred vision, vision loss in left eye and vision loss in right eye.   Cardiovascular: Negative for claudication, dyspnea on exertion, irregular heartbeat, near-syncope, orthopnea, paroxysmal nocturnal dyspnea and syncope.   Respiratory: Negative for cough and wheezing.    Endocrine: Negative for cold intolerance.   Hematologic/Lymphatic: Negative for bleeding problem. Does not bruise/bleed easily.   Skin: Negative for rash.   Musculoskeletal: Positive for joint pain (right knee). Negative for arthralgias, falls, gout, joint swelling, myalgias and neck pain.   Gastrointestinal: Negative for abdominal pain, anorexia, change in bowel habit, hematochezia, hemorrhoids, jaundice, melena, nausea and vomiting.   Genitourinary: Negative for frequency, hematuria and nocturia.   Neurological: Negative for focal weakness, headaches, light-headedness, loss of balance, numbness and vertigo.   Psychiatric/Behavioral: Negative for altered mental status, depression and memory loss. The patient is not nervous/anxious.    Allergic/Immunologic: Negative for hives and persistent infections.       Current Outpatient Prescriptions on File Prior to Visit    Medication Sig Dispense Refill    allopurinol (ZYLOPRIM) 100 MG tablet Take 100 mg by mouth once daily.      apixaban 5 mg Tab Take 1 tablet (5 mg total) by mouth 2 (two) times daily. 60 tablet 0    ascorbic acid (VITAMIN C) 100 MG tablet Take 100 mg by mouth once daily.      calcium citrate (CALCITRATE) 200 mg (950 mg) tablet Take 1 tablet by mouth once daily.      cloNIDine (CATAPRES) 0.1 MG tablet take one tablet by mouth every 8 hours at 7 am, 1 pm, and 9 pm  1    co-enzyme Q-10 50 mg capsule Take 50 mg by mouth once daily.      digoxin (LANOXIN) 125 mcg tablet TAKE 1 TABLET ONE TIME DAILY 90 tablet 3    doxycycline (MONODOX) 100 MG capsule Take 1 capsule (100 mg total) by mouth every 12 (twelve) hours. 28 capsule 0    finasteride (PROSCAR) 5 mg tablet Take 5 mg by mouth once daily.      fish oil-omega-3 fatty acids 300-1,000 mg capsule Take 2 g by mouth once daily.      furosemide (LASIX) 40 MG tablet Take 1 tablet (40 mg total) by mouth once daily. 30 tablet 11    glucosamine-chondroitin 500-400 mg tablet Take 1 tablet by mouth 3 (three) times daily.      hydrocodone-acetaminophen 5-325mg (NORCO) 5-325 mg per tablet Take 1 tablet by mouth every 8 (eight) hours as needed for Pain. 30 tablet 0    influenza (FLUZONE HIGH-DOSE 2017-18, PF,) 180 mcg/0.5 mL vaccine Inject 0.5 mLs into the muscle once. a year      losartan (COZAAR) 100 MG tablet Take 100 mg by mouth once daily.      metoprolol succinate (TOPROL-XL) 25 MG 24 hr tablet TAKE 1 TABLET ONE TIME DAILY 90 tablet 3    MULTIVITAMIN W-MINERALS/LUTEIN (CENTRUM SILVER ORAL) Take 1 tablet by mouth once daily.       NIFEdipine (PROCARDIA-XL) 30 MG (OSM) 24 hr tablet Take 30 mg by mouth every morning.      NIFEdipine (PROCARDIA-XL) 60 MG (OSM) 24 hr tablet Take 60 mg by mouth every evening.      potassium chloride (MICRO-K) 10 MEQ CpSR Take 10 mEq by mouth once daily.       rosuvastatin (CRESTOR) 40 MG Tab Take 40 mg by mouth once daily.    "   vitamin D 1000 units Tab Take 185 mg by mouth once daily.       No current facility-administered medications on file prior to visit.        /85   Pulse 97   Ht 5' 8" (1.727 m)   Wt 78 kg (172 lb)   BMI 26.15 kg/m²       Objective:     Physical Exam   Constitutional: He is oriented to person, place, and time. He appears well-developed and well-nourished. He does not appear ill. No distress.   HENT:   Head: Normocephalic and atraumatic.   Nose: Nose normal.   Eyes: Right eye exhibits no discharge. Left eye exhibits no discharge. Right conjunctiva is not injected. Left conjunctiva is not injected. Right pupil is round. Left pupil is round. Pupils are equal.   Neck: Neck supple. No JVD present. Carotid bruit is not present. No thyromegaly present.   Cardiovascular: Normal rate, S1 normal and S2 normal.  An irregularly irregular rhythm present.  No extrasystoles are present. PMI is not displaced.  Exam reveals no gallop.    Murmur heard.   Harsh midsystolic murmur is present with a grade of 2/6  at the upper right sternal border radiating to the neck  Pulses:       Radial pulses are 2+ on the right side, and 2+ on the left side.        Femoral pulses are 2+ on the right side, and 2+ on the left side.       Dorsalis pedis pulses are 2+ on the right side, and 1+ on the left side.        Posterior tibial pulses are 2+ on the right side, and 0 on the left side.   Pulmonary/Chest: Effort normal and breath sounds normal.   Abdominal: Soft. Normal appearance. There is no hepatosplenomegaly. There is no tenderness.   Musculoskeletal:        Right ankle: He exhibits swelling. He exhibits no ecchymosis and no deformity.        Left ankle: He exhibits swelling. He exhibits no ecchymosis and no deformity.   Lymphadenopathy:        Head (right side): No submandibular adenopathy present.        Head (left side): No submandibular adenopathy present.     He has no cervical adenopathy.   Neurological: He is alert and " oriented to person, place, and time. He is not disoriented. No cranial nerve deficit.   Skin: Skin is warm, dry and intact. No rash noted. He is not diaphoretic. No cyanosis. Nails show no clubbing.   Psychiatric: He has a normal mood and affect. His speech is normal and behavior is normal. Judgment and thought content normal. Cognition and memory are normal.       Assessment:     1. Heart failure, systolic and diastolic, chronic    2. Chronic atrial fibrillation    3. Chronic anticoagulation    4. Bilateral carotid artery stenosis    5. History of cerebrovascular accident    6. Peripheral artery disease    7. Essential hypertension    8. Hypercholesterolemia    9. Hemarthrosis        Plan:     1. Heart Failure, Systolic & Diastolic, Chronic              8/1/2016: Echo: Normal left ventricular size with mild systolic dysfunction. EF 45%. Markedly dilated LA. Moderate aortic valve sclerosis - 1.6 m/s.              1/8/2018: Normal left ventricular size with low normal systolic dysfunction. EF 50-55%. Anteroseptal wall mildly hypokinetic. Mild LVH. Markedly dilated LA. Moderate aortic valve sclerosis - 1.5 m/s.               On losartan 100 mg Q24, metoprolol 25 Q24 and furosemide 40 mg Q24.              Off ramipril 10 mg Q24 due to dry cough.              Appears reasonably well compensated.              Previously seen edema of ankles well controlled with pressure stockings.     2. Atrial Fibrillation              2010: Diagnosed with permanent atrial fibrillation.              CHADS2=4 (CHAS2). [id number]=0 ([id number])              On warfarin.              On metoprolol 25 mg Q24 and digoxin 0.25 mg Q24.   1/15/2018:  bpm.              Rate appears high.   Increase metoprolol to 50 mg Q24.   Do Holter in 1 week.     3. Chronic Anticoagulation              2010: Diagnosed with permanent atrial fibrillation.              CHADS2=4 (CHAS2). [id number]=5 ([id number]).              Used to be on warfarin.  Anticoagulation managed by Dr. Pressley.   1/13/2018: Began apixiban.   On apixiban 5 mg Q12.              No aspirin or NSAID.                 4. Carotid Artery Stenosis              2004: Right CEA.              8/26/2014: Carotid Duplex: DIANNA: Mild. LICA: Critical - 3.4 m/s.              9/11/2014: [facility]: 4V: DIANNA: Mild. Left Vert: 90%. LICA: 95%.              9/2014: Left CEA.              12/1/2016: Carotid Duplex: Moderate plaquing.              12/13/2017: Carotid Duplex: Moderate plaquing.              No need for aspirin.              Plan next Carotid Duplex in 12/2018 and yearly.     5. History of Cerebrovascular Accident              5/2004: CVA. Left sided hemiplegia. Received tPA. Good recovery.              7/2004: Left temporal CVA. Weak.     6. Peripheral Artery Disease              10/25/2016: Arterial Duplex: Right: SFA: Distal 1.8 m/s. Left: No obstructive disease above knee.              Asymptomatic.     7. Hypertension              1990: Diagnosed.               On losartan 100 mg Q24, metoprolol 25 Q24, clonidine 0.1 mg Q8, nifedipine 30 mg Q12 and furosemide 40 mg Q24.              Keeping log at home              Well controlled.     8. Hypercholesterolemia              2004: Began statin.              On rosuvastatin 40 mg Q24.              Tells me well controlled.     9. Knee Pain              1/5/2018: Right knee pain became severe.              Appears to have had hemarthrosis.   INR was 4.4.    F/u 2 months.    Zachary Pressley M.D.      1/25/2018 8:07 PM, Addendum:    1/23/2018: Holter: Atrial fibrillation 81 () bpm. Briefly in 30s at night. Longest RR 2.1 sec.    Appears reasonable to sta on same AV slowing regimen.    I discussed the above test result and the implications of the findings over the phone.    Zachary Pressley M.D.        2/27/2018 6:53 PM, Addendum:    I have been informed about plans for surgery - total knee replacement - Dr. Nicholas Gonzalez.    He will need to hold  apixiban prior to surgery - no apixiban for 48 hours prior to surgery [ - last dose Sunday morning for surgery on a Tuesday morning].    Holding anticoagulation obviously puts him at a small risk for embolic events.    Appears cardiac risk with planned surgery is acceptable.    Zachary Pressley M.D.      Copy:    Dr. Nicholas Gonzalez.    Dr. Dave Luna, Sr..

## 2018-01-16 ENCOUNTER — OFFICE VISIT (OUTPATIENT)
Dept: INFECTIOUS DISEASES | Facility: CLINIC | Age: 78
End: 2018-01-16
Payer: MEDICARE

## 2018-01-16 ENCOUNTER — LAB VISIT (OUTPATIENT)
Dept: LAB | Facility: HOSPITAL | Age: 78
End: 2018-01-16
Payer: MEDICARE

## 2018-01-16 VITALS
WEIGHT: 172 LBS | TEMPERATURE: 98 F | HEIGHT: 68 IN | HEART RATE: 96 BPM | BODY MASS INDEX: 26.07 KG/M2 | DIASTOLIC BLOOD PRESSURE: 83 MMHG | SYSTOLIC BLOOD PRESSURE: 132 MMHG

## 2018-01-16 DIAGNOSIS — M25.00 HEMARTHROSIS: Primary | ICD-10-CM

## 2018-01-16 DIAGNOSIS — M25.561 ACUTE PAIN OF RIGHT KNEE: ICD-10-CM

## 2018-01-16 DIAGNOSIS — M25.00 HEMARTHROSIS: ICD-10-CM

## 2018-01-16 PROBLEM — L03.115 CELLULITIS OF RIGHT KNEE: Status: RESOLVED | Noted: 2018-01-07 | Resolved: 2018-01-16

## 2018-01-16 LAB
BACTERIA SPEC ANAEROBE CULT: NORMAL
BASOPHILS # BLD AUTO: 0.11 K/UL
BASOPHILS NFR BLD: 0.8 %
CRP SERPL-MCNC: 82.4 MG/L
DIFFERENTIAL METHOD: ABNORMAL
EOSINOPHIL # BLD AUTO: 0.3 K/UL
EOSINOPHIL NFR BLD: 2.5 %
ERYTHROCYTE [DISTWIDTH] IN BLOOD BY AUTOMATED COUNT: 14.9 %
ERYTHROCYTE [SEDIMENTATION RATE] IN BLOOD BY WESTERGREN METHOD: 75 MM/HR
HCT VFR BLD AUTO: 35.4 %
HGB BLD-MCNC: 11.7 G/DL
IMM GRANULOCYTES # BLD AUTO: 0.3 K/UL
IMM GRANULOCYTES NFR BLD AUTO: 2.3 %
LYMPHOCYTES # BLD AUTO: 1.4 K/UL
LYMPHOCYTES NFR BLD: 10.8 %
MCH RBC QN AUTO: 28.3 PG
MCHC RBC AUTO-ENTMCNC: 33.1 G/DL
MCV RBC AUTO: 86 FL
MONOCYTES # BLD AUTO: 1.1 K/UL
MONOCYTES NFR BLD: 8.1 %
NEUTROPHILS # BLD AUTO: 9.8 K/UL
NEUTROPHILS NFR BLD: 75.5 %
NRBC BLD-RTO: 0 /100 WBC
PLATELET # BLD AUTO: 540 K/UL
PMV BLD AUTO: 8.8 FL
RBC # BLD AUTO: 4.13 M/UL
WBC # BLD AUTO: 13.01 K/UL

## 2018-01-16 PROCEDURE — 85025 COMPLETE CBC W/AUTO DIFF WBC: CPT

## 2018-01-16 PROCEDURE — 86140 C-REACTIVE PROTEIN: CPT

## 2018-01-16 PROCEDURE — 99999 PR PBB SHADOW E&M-EST. PATIENT-LVL IV: CPT | Mod: PBBFAC,,, | Performed by: PHYSICIAN ASSISTANT

## 2018-01-16 PROCEDURE — 85651 RBC SED RATE NONAUTOMATED: CPT

## 2018-01-16 PROCEDURE — 99213 OFFICE O/P EST LOW 20 MIN: CPT | Mod: S$GLB,,, | Performed by: PHYSICIAN ASSISTANT

## 2018-01-16 PROCEDURE — 36415 COLL VENOUS BLD VENIPUNCTURE: CPT

## 2018-01-16 RX ORDER — UBIDECARENONE 30 MG
CAPSULE ORAL
Status: ON HOLD | COMMUNITY
Start: 2017-11-18 | End: 2018-03-09 | Stop reason: HOSPADM

## 2018-01-16 NOTE — LETTER
January 16, 2018      Michael Boston MD  [address]  [facility] [address]           [facility]  [address]  [facility] [address]  Phone: [phone]  Fax: [phone]          Patient: Alan Gutiérrez   MR Number: [id number]   YOB: 1940   Date of Visit: 1/16/2018       Dear Dr. Michael Boston:    Thank you for referring Alan Gutiérrez to me for evaluation. Attached you will find relevant portions of my assessment and plan of care.    If you have questions, please do not hesitate to call me. I look forward to following Alan Gutiérrez along with you.    Sincerely,    SAIRA Camargo    Enclosure  CC:  No Recipients    If you would like to receive this communication electronically, please contact [email or web address] or [phone] to request more information on [employer] Link access.    For providers and/or their staff who would like to refer a patient to [facility], please contact us through our one-stop-shop provider referral line, [facility], at [phone].    If you feel you have received this communication in error or would no longer like to receive these types of communications, please e-mail [email or web address]

## 2018-01-16 NOTE — PROGRESS NOTES
Subjective:      Patient ID: Alan Gutiérrez is a 77 y.o. male.    Chief Complaint:Hospital Follow Up      History of Present Illness  Presents as a hospital follow up for right knee hemarthrosis. Went in for right knee pain. Knee was tapped; cultures were negative. Blood cultures negative. Patient was seen by marysol Fuentes, as well as ID, Dr. Anders in hospital. There was low suspicion for septic joint and thought to be from hemarthrosis as patient is on coumadin and had supratherapeutic INR. Patient was placed on doxycycline 100 mg PO BID for prophylaxis and follows up today. Patient was discharged on 1/11. He is here today with his wife. He still complains of severe pain, states now 7-8/10; it was 9/10. Denies fevers or chills. No new redness. Still has swelling and warmth. No new symptoms. Tolerating the doxycycline okay with diarrhea or nausea. Taking pain medication as needed.    Review of Systems   Constitution: Positive for decreased appetite, weakness and malaise/fatigue. Negative for chills, fever, night sweats, weight gain and weight loss.   HENT: Negative for congestion, ear pain, hearing loss, hoarse voice, sore throat and tinnitus.    Eyes: Negative for blurred vision, redness and visual disturbance.   Cardiovascular: Positive for leg swelling. Negative for chest pain and palpitations.   Respiratory: Negative for cough, hemoptysis, shortness of breath and sputum production.    Hematologic/Lymphatic: Negative for adenopathy. Bruises/bleeds easily.   Skin: Positive for rash. Negative for dry skin, itching and suspicious lesions.   Musculoskeletal: Positive for back pain, joint pain and myalgias. Negative for neck pain.   Gastrointestinal: Positive for constipation. Negative for abdominal pain, diarrhea, heartburn, nausea and vomiting.   Genitourinary: Positive for dysuria. Negative for flank pain, frequency, hematuria, hesitancy and urgency.   Neurological: Negative for dizziness, headaches,  numbness and paresthesias.   Psychiatric/Behavioral: Negative for depression and memory loss. The patient has insomnia. The patient is not nervous/anxious.      Objective:   Physical Exam   Constitutional: He is oriented to person, place, and time. He appears well-developed and well-nourished.   HENT:   Head: Normocephalic and atraumatic.   Eyes: EOM are normal.   Musculoskeletal: Normal range of motion. He exhibits edema and tenderness.   Right knee with swelling and warmth; no erythema. TTP. Full range of motion.   Neurological: He is alert and oriented to person, place, and time.   Skin: Skin is dry. No rash noted. No erythema.   Psychiatric: He has a normal mood and affect. His behavior is normal.     Assessment:       1. Hemarthrosis    2. Acute pain of right knee        Still with pain, swelling, and warmth but pain slightly better. No erythema. Cultures negative. No fever or chills. Tolerating doxycycline. Has appt with ortho Thursday.    Plan:      1. Continue doxycycline 100 mg PO BID for prophylaxis given hemarthrosis; may need to extend abx if swelling does not go down  2. Will repeat labs today: CBC, ESR, and CRP (although may still be elevated given inflammation)  3. F/u with Dr. Gonzalez on Thursday as scheduled  4. F/u ID clinic 2 weeks of sooner for issues    Also seen by Dr. Anders, ID staff.

## 2018-01-22 ENCOUNTER — CLINICAL SUPPORT (OUTPATIENT)
Dept: CARDIOLOGY | Facility: CLINIC | Age: 78
End: 2018-01-22
Payer: MEDICARE

## 2018-01-22 DIAGNOSIS — I48.21 PERMANENT ATRIAL FIBRILLATION: ICD-10-CM

## 2018-01-22 PROCEDURE — 93224 XTRNL ECG REC UP TO 48 HRS: CPT | Mod: S$GLB,,, | Performed by: INTERNAL MEDICINE

## 2018-02-07 ENCOUNTER — OFFICE VISIT (OUTPATIENT)
Dept: INFECTIOUS DISEASES | Facility: CLINIC | Age: 78
End: 2018-02-07
Payer: MEDICARE

## 2018-02-07 VITALS
BODY MASS INDEX: 25.01 KG/M2 | WEIGHT: 165 LBS | SYSTOLIC BLOOD PRESSURE: 114 MMHG | HEART RATE: 96 BPM | DIASTOLIC BLOOD PRESSURE: 78 MMHG | TEMPERATURE: 98 F | HEIGHT: 68 IN

## 2018-02-07 DIAGNOSIS — M25.00 HEMARTHROSIS: Primary | ICD-10-CM

## 2018-02-07 PROCEDURE — 99213 OFFICE O/P EST LOW 20 MIN: CPT | Mod: S$GLB,,, | Performed by: PHYSICIAN ASSISTANT

## 2018-02-07 PROCEDURE — 1159F MED LIST DOCD IN RCRD: CPT | Mod: S$GLB,,, | Performed by: PHYSICIAN ASSISTANT

## 2018-02-07 PROCEDURE — 1126F AMNT PAIN NOTED NONE PRSNT: CPT | Mod: S$GLB,,, | Performed by: PHYSICIAN ASSISTANT

## 2018-02-07 PROCEDURE — 3008F BODY MASS INDEX DOCD: CPT | Mod: S$GLB,,, | Performed by: PHYSICIAN ASSISTANT

## 2018-02-07 PROCEDURE — 99999 PR PBB SHADOW E&M-EST. PATIENT-LVL IV: CPT | Mod: PBBFAC,,, | Performed by: PHYSICIAN ASSISTANT

## 2018-02-07 NOTE — PROGRESS NOTES
Subjective:      Patient ID: Alan Gutiérrez is a 77 y.o. male.    Chief Complaint:Follow-up      History of Present Illness    {ID HPI BLOCKS:68853}    Review of Systems   Constitution: Negative for chills, decreased appetite, fever, weakness, malaise/fatigue, night sweats, weight gain and weight loss.   HENT: Negative for congestion, ear pain, hearing loss, hoarse voice, sore throat and tinnitus.    Eyes: Negative for blurred vision, redness and visual disturbance.   Cardiovascular: Positive for leg swelling and palpitations. Negative for chest pain.   Respiratory: Negative for cough, hemoptysis, shortness of breath and sputum production.    Hematologic/Lymphatic: Negative for adenopathy. Bruises/bleeds easily.   Skin: Positive for dry skin. Negative for itching, rash and suspicious lesions.   Musculoskeletal: Positive for joint pain. Negative for back pain, myalgias and neck pain.   Gastrointestinal: Negative for abdominal pain, constipation, diarrhea, heartburn, nausea and vomiting.   Genitourinary: Negative for dysuria, flank pain, frequency, hematuria, hesitancy and urgency.   Neurological: Negative for dizziness, headaches, numbness and paresthesias.   Psychiatric/Behavioral: Negative for depression and memory loss. The patient has insomnia. The patient is not nervous/anxious.      Objective:   Physical Exam  Assessment:       No diagnosis found.      Plan:       ***

## 2018-02-22 DIAGNOSIS — M25.561 RIGHT KNEE PAIN: Primary | ICD-10-CM

## 2018-02-26 ENCOUNTER — CLINICAL SUPPORT (OUTPATIENT)
Dept: REHABILITATION | Facility: HOSPITAL | Age: 78
End: 2018-02-26
Payer: MEDICARE

## 2018-02-26 DIAGNOSIS — G89.29 CHRONIC PAIN OF RIGHT KNEE: ICD-10-CM

## 2018-02-26 DIAGNOSIS — M25.561 CHRONIC PAIN OF RIGHT KNEE: ICD-10-CM

## 2018-02-26 DIAGNOSIS — M25.661 DECREASED RANGE OF MOTION OF RIGHT KNEE: ICD-10-CM

## 2018-02-26 DIAGNOSIS — Z74.09 IMPAIRED FUNCTIONAL MOBILITY, BALANCE, GAIT, AND ENDURANCE: ICD-10-CM

## 2018-02-26 DIAGNOSIS — M62.81 MUSCLE WEAKNESS: ICD-10-CM

## 2018-02-26 PROCEDURE — 97161 PT EVAL LOW COMPLEX 20 MIN: CPT | Mod: PN

## 2018-02-26 PROCEDURE — G8978 MOBILITY CURRENT STATUS: HCPCS | Mod: CL,PN

## 2018-02-26 PROCEDURE — 97110 THERAPEUTIC EXERCISES: CPT | Mod: PN

## 2018-02-26 PROCEDURE — G8979 MOBILITY GOAL STATUS: HCPCS | Mod: CK,PN

## 2018-02-26 NOTE — PLAN OF CARE
Physical Therapy Evaluation    Name: Alan Gutiérrez  [facility] Number: [id number]    Diagnosis:   Encounter Diagnoses   Name Primary?    Chronic pain of right knee     Impaired functional mobility, balance, gait, and endurance     Muscle weakness     Decreased range of motion of right knee      Physician: Nicholas Gonzalez MD  Treatment Orders: PT Eval and Treat    Past Medical History:   Diagnosis Date    A-fib     Carotid artery stenosis 8/28/2014 8/26/2014: Carotid duplex: DIANNA: mild. LICA: critical - 3.4 m/s.    CHF (congestive heart failure)     Chronic anticoagulation 8/28/2014    Heart failure, systolic and diastolic, chronic 11/17/2016 8/1/2016: Echo: Normal left ventricular size with mild systolic dysfunction. EF 45%. Markedly dilated LA. Moderate aortic valve sclerosis - 1.6 m/s.    Herniated disc     Hypercholesterolemia 11/17/2016 2004: Began statin.    Hypertension     Hypertension 11/17/2016 1990: Diagnosed.    Lipids abnormal     OA (osteoarthritis)     Osteopenia     Peripheral artery disease 12/1/2016    10/25/2016: Arterial Duplex: Right: SFA: Distal 1.8 m/s. Left: No obstructive disease above knee.    Stroke     2004     Current Outpatient Prescriptions   Medication Sig    allopurinol (ZYLOPRIM) 100 MG tablet Take 100 mg by mouth once daily.    apixaban 5 mg Tab Take 1 tablet (5 mg total) by mouth 2 (two) times daily.    ascorbic acid (VITAMIN C) 100 MG tablet Take 100 mg by mouth once daily.    calcium citrate (CALCITRATE) 200 mg (950 mg) tablet Take 1 tablet by mouth once daily.    cloNIDine (CATAPRES) 0.1 MG tablet take one tablet by mouth every 8 hours at 7 am, 1 pm, and 9 pm    co-enzyme Q-10 30 mg capsule     digoxin (LANOXIN) 125 mcg tablet TAKE 1 TABLET ONE TIME DAILY    finasteride (PROSCAR) 5 mg tablet Take 5 mg by mouth once daily.    fish oil-omega-3 fatty acids 300-1,000 mg capsule Take 2 g by mouth once daily.    furosemide (LASIX) 40 MG tablet  Take 1 tablet (40 mg total) by mouth once daily.    glucosamine-chondroitin 500-400 mg tablet Take 1 tablet by mouth 3 (three) times daily.    hydrocodone-acetaminophen 5-325mg (NORCO) 5-325 mg per tablet Take 1 tablet by mouth every 8 (eight) hours as needed for Pain.    influenza (FLUZONE HIGH-DOSE 2017-18, PF,) 180 mcg/0.5 mL vaccine Inject 0.5 mLs into the muscle once. a year    losartan (COZAAR) 100 MG tablet Take 100 mg by mouth once daily.    metoprolol succinate (TOPROL-XL) 50 MG 24 hr tablet Take 1 tablet (50 mg total) by mouth once daily.    MULTIVITAMIN W-MINERALS/LUTEIN (CENTRUM SILVER ORAL) Take 1 tablet by mouth once daily.     NIFEdipine (PROCARDIA-XL) 30 MG (OSM) 24 hr tablet Take 30 mg by mouth every morning.    NIFEdipine (PROCARDIA-XL) 60 MG (OSM) 24 hr tablet Take 60 mg by mouth every evening.    potassium chloride (MICRO-K) 10 MEQ CpSR Take 10 mEq by mouth once daily.     rosuvastatin (CRESTOR) 40 MG Tab Take 40 mg by mouth once daily.    vitamin D 1000 units Tab Take 185 mg by mouth once daily.     No current facility-administered medications for this visit.      Review of patient's allergies indicates:  No Known Allergies  Precautions: upper body skin cancer, CHF, blood thinner    Evaluation Date: 2/26/18  Visit # authorized: 20  Authorization period: 12/31/18    Juan Pablo LOPEZ is a 77 y.o. male that presents to [facility] outpatient clinic secondary to R knee pain.     Plans to have R TKA on 3/9/18.  Pt presents to clinic with wife and RW.   Pt reports sedentary lifestyle.    Patient c/o: continuous symptoms  Radicular symptoms: none  Onset: insidious about 25 years  Pain Scale: Rates pain on a scale of 0-10 to be 9 at worst; 2 currently; 2 at best .  Aggravating factors: walking long distances, standing, car transfers  Relieving factors: icing, rest, sitting  Previous treatment: ESIs in R knee and back- last knee injection on Jan 4th, 2018 and HH/outpatient PT with some  "improvements with continuation of HEP  Imaging: x-ray the week before Christmas 2017 with bone on bone arthritis  Past surgical history: R knee scope about 25 years, 6 months ago lumbar denervation surgery  Functional deficits: walking long distances, driving, prolonged standing, stair ambulation, household chores  Prior level of function: independent with all ADLs  Occupation: not currently employed  Environment: 2 story home with 1 step to enter, has RW, rollator, and crutches, lives with wife  No cultural or spiritual barriers identified to treatment or learning.  Patient's goals: "to get my knee in shape before surgery so I don't have to worry about it"    Objective     Observation: pleasant and cooperative   Gait: increased trunk flexion, decreased B heel strike, excessive B knee flexion    Posture: stands with B knee flexion, forward trunk flexion    Range of Motion:   Knee Right AROM/PROM Left AROM/PROM   Flexion 110/- 135/-   Extension 10 deg flex/- 0/-     Lower Extremity Strength  Right LE  Left LE    Knee extension: NT Knee extension: 4+/5   Knee flexion: 4/5 Knee flexion: 4+/5   Hip flexion: 4/5 Hip flexion: 4+/5   Hip extension:  3/5 Hip extension: 3/5   Hip abduction: 4/5 Hip abduction: 4/5   Hip adduction: 2/5 Hip adduction 3+/5   Ankle dorsiflexion: 4/5 Ankle dorsiflexion: 4+/5   Ankle plantarflexion: 5/5 Ankle plantarflexion: 5/5     Joint Mobility:     Patellar sup./inf: B hypomobile (R>L)   Patellar med/lat: B WFL    Palpation:    Joint line: R TTP   Quad contraction: R fair, L good    Sensation: B LEs intact to light touch but feels tenderness in lower leg, darkened skin discoloration noted in lower legs    Flexibility:   Ely's test: R = 80 degrees ; L = 95 degrees   Popliteal Angle: R = -35 degrees ; L = -45 degrees    Functional Limitations Reports - G Codes  CMS Impairment/Limitation/Restriction for FOTO Knee Survey  Status Limitation G-Code CMS Severity Modifier  Intake 30% 70% Current Status " CL - At least 60 percent but less than 80 percent  Predicted 49% 51% Goal Status+ CK - At least 40 percent but less than 60 percent  +Based on FOTO predicted change score    PT Evaluation Completed? Yes  Discussed Plan of Care with patient: Yes    TREATMENT:  Alan LOPEZ received therapeutic exercises to develop strength and endurance, flexibility for 20 minutes including:    Quad sets 5 sec hold x 20  SLR x 10  SL hip abd x 10  SL hip add x 10  Prone hip ext x 10  Supine hamstring stretch w strap 3 x 30 sec  Bridges x 10    HEP provided: See Patient Instructions from 2/26/2018 (supine HS stretch, prone quad stretch, quad sets, SAQ, LAQ, SLR, SL hip abduction, SL hip adduction, prone hip extension, heel slides, bridges, and ankle pumps)  Instructed pt. Regarding: Proper technique with all exercises. Pt demo good understanding of the education provided. Alan LOPEZ demonstrated good return demonstration of activities.    Assessment     This is a 77 y.o. male referred to outpatient physical therapy and presents with a medical diagnosis of R knee pain and demonstrates limitations as described in the problem list. Pt presents to clinic with R knee pain, decreased knee AROM, muscle weakness, muscle tightness, and impaired gait, balance, endurance, and functional mobility. Pt plans for R TKA on 3/9/18. Pt will benefit from physcial therapy services in order to maximize pain free and/or functional use of right LE. The following goals were discussed with the patient and patient is in agreement with them as to be addressed in the treatment plan. Pt was given a HEP consisting of supine HS stretch, prone quad stretch, quad sets, SAQ, LAQ, SLR, SL hip abduction, SL hip adduction, prone hip extension, heel slides, bridges, and ankle pumps. Pt verbally understood the instructions as they were given and demonstrated proper form and technique during therapy. Pt was advised to perform these exercises free of pain, and to stop performing  them if pain occurs.     History  Co-morbidities and personal factors that may impact the plan of care Examination  Body Structures and Functions, activity limitations and participation restrictions that may impact the plan of care Clinical Presentation   Decision Making/ Complexity Score   Co-morbidities:     CHF    Personal Factors:     Sedentary lifestyle Body Regions: R LE    Body Systems: musculoskeletal (muscle weakness, muscle tightness, R knee pain, impaired gait, balance, endurance, and functional mobility, decreased R knee ROM)    Activity limitations: walking long distances, driving, prolonged standing, stair ambulation, household chores    Participation Restrictions: ADLs, IADLs, domestic duties   Stable and predictable   Low     Prognosis: fair to good    Anticipated barriers to physical therapy: none    Medical necessity is demonstrated by the following IMPAIRMENTS/PROBLEM LIST:   1) Increase in pain level limiting function   2) LE weakness   3) Difficulty walking long distances   4) Impaired gait   5) Lack of HEP    GOALS: Short Term Goals: 2 weeks  1. Report decreased R knee pain  <  / =  5/10 at worst to increase tolerance for walking.  2. Pt will be able to tolerate multi-directional LE strengthening in order to improve ability to perform household chores.  3. Pt will report 50% improvement in ability to walk long distances since start of care to indicate improved functional mobility.   4. Pt will ambulate 200' demonstrating only mild gait deviations to improve gait pattern prior to TKA.   5. Pt to tolerate HEP to improve ROM and independence with ADL's    Plan     Pt will be treated by physical therapy 3 times a week for 2 weeks for Pt Education, HEP, therapeutic exercises, neuromuscular re-education, joint mobilizations, modalities prn to achieve established goals. Alan LOPEZ may at times be seen by a PTA as part of the Rehab Team.     Cont PT for  2 weeks.     I certify the need for these  services furnished under this plan of treatment and while under my care.______________________________ Physician/Referring Practitioner  Date of Signature

## 2018-02-26 NOTE — PATIENT INSTRUCTIONS
Supine: Leg Stretch With Strap (Intermediate)        Lie on back with one knee bent, foot flat on floor. Hook strap around other foot. Straighten knee. Raise leg further toward its maximal range. Hold 30 seconds. Relax leg completely down.   Repeat 3 times per session. Do 2-3 sessions per day.    Copyright © [facility]. All rights reserved.     KNEE: Quadriceps - Prone        Place strap around ankle. Bring ankle toward buttocks. Press hip into surface. Hold for 30 seconds 3 times resting in between each stretch. Repeat on other leg. Perform 2 times a day everyday.     Copyright © I. All rights reserved.     Strengthening: Quadriceps Set    Tighten muscles on top of thighs by pushing knees down into surface. Hold 5 seconds.  Repeat 10 times right leg per set. Do 2 sets per session. Do 2 sessions per day.      Heel Slides    With towel around left heel, gently pull knee up with towel until stretch is felt. Hold 5 seconds.  Repeat 10 times right leg per set. Do 2 sets per session. Do 2 sessions per day.      Straight Leg Raise     With right leg straight, other leg bent, raise straight leg until knees are even. Slowly lower. Roll on your side and repeat lifting top leg up, on other side, lifting bottom leg up, and on stomach kicking back (squeezing your rear end before you kick back).  Repeat 10 times right leg per set. Do 2 sets per session. Do 2 sessions per day.    Bridging        Lift buttocks, engage abdominals keeping back straight and arms on floor.  Hold 1 seconds. Repeat 10 times for 2 sets.    Copyright © [employer]. All rights reserved.       Ankle pumps    Ankle pumps can help prevent circulation problems, such as blood clots. Do ankle pumps by pointing and flexing your feet.    KNEE: Extension, Short Arc Quads - Supine        Place bolster under knees. Raise one leg until knee is straight. 10 reps per set, 2 sets per day. Perform everyday.    Copyright © I. All rights reserved.     Leg Extension        Sit with  back of one knee against edge of seat, foot not touching floor, back straight. Inhale, then while exhaling, straighten leg. Hold 1 count. Lower leg slowly.  Repeat 10 times per set. Do 2 sets per session. Do 2 sessions per day.    Copyright © [employer]. All rights reserved.

## 2018-02-26 NOTE — PROGRESS NOTES
Physical Therapy Evaluation    Name: Alan Gutiérrez  [facility] Number: [id number]    Diagnosis:   Encounter Diagnoses   Name Primary?    Chronic pain of right knee     Impaired functional mobility, balance, gait, and endurance     Muscle weakness     Decreased range of motion of right knee      Physician: Nicholas Gonzalez MD  Treatment Orders: PT Eval and Treat    Past Medical History:   Diagnosis Date    A-fib     Carotid artery stenosis 8/28/2014 8/26/2014: Carotid duplex: DIANNA: mild. LICA: critical - 3.4 m/s.    CHF (congestive heart failure)     Chronic anticoagulation 8/28/2014    Heart failure, systolic and diastolic, chronic 11/17/2016 8/1/2016: Echo: Normal left ventricular size with mild systolic dysfunction. EF 45%. Markedly dilated LA. Moderate aortic valve sclerosis - 1.6 m/s.    Herniated disc     Hypercholesterolemia 11/17/2016 2004: Began statin.    Hypertension     Hypertension 11/17/2016 1990: Diagnosed.    Lipids abnormal     OA (osteoarthritis)     Osteopenia     Peripheral artery disease 12/1/2016    10/25/2016: Arterial Duplex: Right: SFA: Distal 1.8 m/s. Left: No obstructive disease above knee.    Stroke     2004     Current Outpatient Prescriptions   Medication Sig    allopurinol (ZYLOPRIM) 100 MG tablet Take 100 mg by mouth once daily.    apixaban 5 mg Tab Take 1 tablet (5 mg total) by mouth 2 (two) times daily.    ascorbic acid (VITAMIN C) 100 MG tablet Take 100 mg by mouth once daily.    calcium citrate (CALCITRATE) 200 mg (950 mg) tablet Take 1 tablet by mouth once daily.    cloNIDine (CATAPRES) 0.1 MG tablet take one tablet by mouth every 8 hours at 7 am, 1 pm, and 9 pm    co-enzyme Q-10 30 mg capsule     digoxin (LANOXIN) 125 mcg tablet TAKE 1 TABLET ONE TIME DAILY    finasteride (PROSCAR) 5 mg tablet Take 5 mg by mouth once daily.    fish oil-omega-3 fatty acids 300-1,000 mg capsule Take 2 g by mouth once daily.    furosemide (LASIX) 40 MG tablet  Take 1 tablet (40 mg total) by mouth once daily.    glucosamine-chondroitin 500-400 mg tablet Take 1 tablet by mouth 3 (three) times daily.    hydrocodone-acetaminophen 5-325mg (NORCO) 5-325 mg per tablet Take 1 tablet by mouth every 8 (eight) hours as needed for Pain.    influenza (FLUZONE HIGH-DOSE 2017-18, PF,) 180 mcg/0.5 mL vaccine Inject 0.5 mLs into the muscle once. a year    losartan (COZAAR) 100 MG tablet Take 100 mg by mouth once daily.    metoprolol succinate (TOPROL-XL) 50 MG 24 hr tablet Take 1 tablet (50 mg total) by mouth once daily.    MULTIVITAMIN W-MINERALS/LUTEIN (CENTRUM SILVER ORAL) Take 1 tablet by mouth once daily.     NIFEdipine (PROCARDIA-XL) 30 MG (OSM) 24 hr tablet Take 30 mg by mouth every morning.    NIFEdipine (PROCARDIA-XL) 60 MG (OSM) 24 hr tablet Take 60 mg by mouth every evening.    potassium chloride (MICRO-K) 10 MEQ CpSR Take 10 mEq by mouth once daily.     rosuvastatin (CRESTOR) 40 MG Tab Take 40 mg by mouth once daily.    vitamin D 1000 units Tab Take 185 mg by mouth once daily.     No current facility-administered medications for this visit.      Review of patient's allergies indicates:  No Known Allergies  Precautions: upper body skin cancer, CHF, blood thinner    Evaluation Date: 2/26/18  Visit # authorized: 20  Authorization period: 12/31/18    Juan Pablo LOPEZ is a 77 y.o. male that presents to [facility] outpatient clinic secondary to R knee pain.     Plans to have R TKA on 3/9/18.  Pt presents to clinic with wife and RW.   Pt reports sedentary lifestyle.    Patient c/o: continuous symptoms  Radicular symptoms: none  Onset: insidious about 25 years  Pain Scale: Rates pain on a scale of 0-10 to be 9 at worst; 2 currently; 2 at best .  Aggravating factors: walking long distances, standing, car transfers  Relieving factors: icing, rest, sitting  Previous treatment: ESIs in R knee and back- last knee injection on Jan 4th, 2018 and HH/outpatient PT with some  "improvements with continuation of HEP  Imaging: x-ray the week before Christmas 2017 with bone on bone arthritis  Past surgical history: R knee scope about 25 years, 6 months ago lumbar denervation surgery  Functional deficits: walking long distances, driving, prolonged standing, stair ambulation, household chores  Prior level of function: independent with all ADLs  Occupation: not currently employed  Environment: 2 story home with 1 step to enter, has RW, rollator, and crutches, lives with wife  No cultural or spiritual barriers identified to treatment or learning.  Patient's goals: "to get my knee in shape before surgery so I don't have to worry about it"    Objective     Observation: pleasant and cooperative   Gait: increased trunk flexion, decreased B heel strike, excessive B knee flexion    Posture: stands with B knee flexion, forward trunk flexion    Range of Motion:   Knee Right AROM/PROM Left AROM/PROM   Flexion 110/- 135/-   Extension 10 deg flex/- 0/-     Lower Extremity Strength  Right LE  Left LE    Knee extension: NT Knee extension: 4+/5   Knee flexion: 4/5 Knee flexion: 4+/5   Hip flexion: 4/5 Hip flexion: 4+/5   Hip extension:  3/5 Hip extension: 3/5   Hip abduction: 4/5 Hip abduction: 4/5   Hip adduction: 2/5 Hip adduction 3+/5   Ankle dorsiflexion: 4/5 Ankle dorsiflexion: 4+/5   Ankle plantarflexion: 5/5 Ankle plantarflexion: 5/5     Joint Mobility:     Patellar sup./inf: B hypomobile (R>L)   Patellar med/lat: B WFL    Palpation:    Joint line: R TTP   Quad contraction: R fair, L good    Sensation: B LEs intact to light touch but feels tenderness in lower leg, darkened skin discoloration noted in lower legs    Flexibility:   Ely's test: R = 80 degrees ; L = 95 degrees   Popliteal Angle: R = -35 degrees ; L = -45 degrees    Functional Limitations Reports - G Codes  CMS Impairment/Limitation/Restriction for FOTO Knee Survey  Status Limitation G-Code CMS Severity Modifier  Intake 30% 70% Current Status " CL - At least 60 percent but less than 80 percent  Predicted 49% 51% Goal Status+ CK - At least 40 percent but less than 60 percent  +Based on FOTO predicted change score    PT Evaluation Completed? Yes  Discussed Plan of Care with patient: Yes    TREATMENT:  Alan LOPEZ received therapeutic exercises to develop strength and endurance, flexibility for 20 minutes including:    Quad sets 5 sec hold x 20  SLR x 10  SL hip abd x 10  SL hip add x 10  Prone hip ext x 10  Supine hamstring stretch w strap 3 x 30 sec  Bridges x 10    HEP provided: See Patient Instructions from 2/26/2018 (supine HS stretch, prone quad stretch, quad sets, SAQ, LAQ, SLR, SL hip abduction, SL hip adduction, prone hip extension, heel slides, bridges, and ankle pumps)  Instructed pt. Regarding: Proper technique with all exercises. Pt demo good understanding of the education provided. Alan LOPEZ demonstrated good return demonstration of activities.    Assessment     This is a 77 y.o. male referred to outpatient physical therapy and presents with a medical diagnosis of R knee pain and demonstrates limitations as described in the problem list. Pt presents to clinic with R knee pain, decreased knee AROM, muscle weakness, muscle tightness, and impaired gait, balance, endurance, and functional mobility. Pt plans for R TKA on 3/9/18. Pt will benefit from physcial therapy services in order to maximize pain free and/or functional use of right LE. The following goals were discussed with the patient and patient is in agreement with them as to be addressed in the treatment plan. Pt was given a HEP consisting of supine HS stretch, prone quad stretch, quad sets, SAQ, LAQ, SLR, SL hip abduction, SL hip adduction, prone hip extension, heel slides, bridges, and ankle pumps. Pt verbally understood the instructions as they were given and demonstrated proper form and technique during therapy. Pt was advised to perform these exercises free of pain, and to stop performing  them if pain occurs.     History  Co-morbidities and personal factors that may impact the plan of care Examination  Body Structures and Functions, activity limitations and participation restrictions that may impact the plan of care Clinical Presentation   Decision Making/ Complexity Score   Co-morbidities:     CHF    Personal Factors:     Sedentary lifestyle Body Regions: R LE    Body Systems: musculoskeletal (muscle weakness, muscle tightness, R knee pain, impaired gait, balance, endurance, and functional mobility, decreased R knee ROM)    Activity limitations: walking long distances, driving, prolonged standing, stair ambulation, household chores    Participation Restrictions: ADLs, IADLs, domestic duties   Stable and predictable   Low     Prognosis: fair to good    Anticipated barriers to physical therapy: none    Medical necessity is demonstrated by the following IMPAIRMENTS/PROBLEM LIST:   1) Increase in pain level limiting function   2) LE weakness   3) Difficulty walking long distances   4) Impaired gait   5) Lack of HEP    GOALS: Short Term Goals: 2 weeks  1. Report decreased R knee pain  <  / =  5/10 at worst to increase tolerance for walking.  2. Pt will be able to tolerate multi-directional LE strengthening in order to improve ability to perform household chores.  3. Pt will report 50% improvement in ability to walk long distances since start of care to indicate improved functional mobility.   4. Pt will ambulate 200' demonstrating only mild gait deviations to improve gait pattern prior to TKA.   5. Pt to tolerate HEP to improve ROM and independence with ADL's    Plan     Pt will be treated by physical therapy 3 times a week for 2 weeks for Pt Education, HEP, therapeutic exercises, neuromuscular re-education, joint mobilizations, modalities prn to achieve established goals. Alan LOPEZ may at times be seen by a PTA as part of the Rehab Team.     Cont PT for  2 weeks.     I certify the need for these  services furnished under this plan of treatment and while under my care.______________________________ Physician/Referring Practitioner  Date of Signature

## 2018-02-26 NOTE — PROGRESS NOTES
Physical Therapy Daily Note     Name: Alan LOPEZ [facility] Number: [id number]  Diagnosis:   Encounter Diagnoses   Name Primary?    Chronic pain of right knee Yes    Impaired functional mobility, balance, gait, and endurance     Muscle weakness     Decreased range of motion of right knee      Physician: Nicholas Gonzalez MD  Precautions: prehab   Visit #: 2 of 20  PTA Visit #: 1    Time In: 1000  Time Out: 1050  Total Treatment Time: 50'      Subjective     Pt reports: reports that he is doing okay today.  Patient states that he is having some pain in his knee today.   Pain Scale: Alan LOPEZ rates pain on a scale of 0-10 to be 2 currently.    Objective     Alan LOPEZ received individual therapeutic exercises to develop strength, endurance, ROM, flexibility, posture and core stabilization for 50 minutes including:    Nustep x 2' <--- stopped due to pain   Quad sets 5 sec hold x 20  SLR 2  x 10  SL hip abd 2 x 10  HL hip add  X 3'   Supine hamstring stretch w strap 4 x 30 sec  Bridges 2 x 10   SAQ 2 x 10 x 2''    LAQ 2 x 10 x 2'' hold     + Standing Heel raises 2 x 10  + Standing Marches 2 x 10     Sit <> stands 2 x 10       Alan LOPEZ received the following manual therapy techniques: patella mobs were applied to the: right knee for 0 minutes including:      Written Home Exercises Reviewed.  Pt demo good understanding of the education provided. Alan LOPEZ demonstrated good return demonstration of activities.     Education provided re: Compliance with HEP.     Alan LOPEZ verbalized good understanding of education provided.   No spiritual or educational barriers to learning provided    Assessment     Patient tolerated treatment session well today.  Patient with fairly good tolerance to exercises with provocation of right knee pain.  Pt unable to complete Nustep today due to audible crepitus and pain. Patient demonstrated good quad activation being able to hold for 3  seconds.  Plan to progress with quad ans hip strengthening as tolerated by the patient.    This is a 77 y.o. male referred to outpatient physical therapy and presents with a medical diagnosis of R knee pain   and demonstrates limitations as described in the problem list. Pt prognosis is Good. Pt will continue to benefit from skilled outpatient physical therapy to address the deficits listed in the problem list, provide pt/family education and to maximize pt's level of independence in the home and community environment.     Goals as follows:  GOALS: Short Term Goals: 2 weeks  1. Report decreased R knee pain  <  / =  5/10 at worst to increase tolerance for walking.  2. Pt will be able to tolerate multi-directional LE strengthening in order to improve ability to perform household chores.  3. Pt will report 50% improvement in ability to walk long distances since start of care to indicate improved functional mobility.   4. Pt will ambulate 200' demonstrating only mild gait deviations to improve gait pattern prior to TKA.   5. Pt to tolerate HEP to improve ROM and independence with ADL's     Plan     Continue with established Plan of Care towards PT goals.    Therapist: Cheri Agosto, PTA  2/26/2018

## 2018-02-28 ENCOUNTER — CLINICAL SUPPORT (OUTPATIENT)
Dept: REHABILITATION | Facility: HOSPITAL | Age: 78
End: 2018-02-28
Payer: MEDICARE

## 2018-02-28 DIAGNOSIS — G89.29 CHRONIC PAIN OF RIGHT KNEE: Primary | ICD-10-CM

## 2018-02-28 DIAGNOSIS — M25.561 CHRONIC PAIN OF RIGHT KNEE: Primary | ICD-10-CM

## 2018-02-28 DIAGNOSIS — Z74.09 IMPAIRED FUNCTIONAL MOBILITY, BALANCE, GAIT, AND ENDURANCE: ICD-10-CM

## 2018-02-28 DIAGNOSIS — M62.81 MUSCLE WEAKNESS: ICD-10-CM

## 2018-02-28 DIAGNOSIS — M25.661 DECREASED RANGE OF MOTION OF RIGHT KNEE: ICD-10-CM

## 2018-02-28 PROCEDURE — 97110 THERAPEUTIC EXERCISES: CPT | Mod: PN

## 2018-03-02 ENCOUNTER — HOSPITAL ENCOUNTER (OUTPATIENT)
Dept: PREADMISSION TESTING | Facility: OTHER | Age: 78
Discharge: HOME | End: 2018-03-02
Attending: ORTHOPAEDIC SURGERY
Payer: MEDICARE

## 2018-03-02 ENCOUNTER — ANESTHESIA EVENT (OUTPATIENT)
Dept: SURGERY | Facility: OTHER | Age: 78
DRG: 470 | End: 2018-03-02
Payer: MEDICARE

## 2018-03-02 ENCOUNTER — CLINICAL SUPPORT (OUTPATIENT)
Dept: REHABILITATION | Facility: HOSPITAL | Age: 78
End: 2018-03-02
Payer: MEDICARE

## 2018-03-02 VITALS
TEMPERATURE: 98 F | SYSTOLIC BLOOD PRESSURE: 130 MMHG | OXYGEN SATURATION: 99 % | DIASTOLIC BLOOD PRESSURE: 70 MMHG | BODY MASS INDEX: 25.01 KG/M2 | HEART RATE: 72 BPM | HEIGHT: 68 IN | WEIGHT: 165 LBS

## 2018-03-02 DIAGNOSIS — G89.29 CHRONIC PAIN OF RIGHT KNEE: ICD-10-CM

## 2018-03-02 DIAGNOSIS — M25.561 CHRONIC PAIN OF RIGHT KNEE: ICD-10-CM

## 2018-03-02 DIAGNOSIS — M17.11 PRIMARY LOCALIZED OSTEOARTHROSIS OF THE KNEE, RIGHT: Primary | ICD-10-CM

## 2018-03-02 DIAGNOSIS — M17.11 PRIMARY OSTEOARTHRITIS OF RIGHT KNEE: ICD-10-CM

## 2018-03-02 DIAGNOSIS — M62.81 MUSCLE WEAKNESS: ICD-10-CM

## 2018-03-02 DIAGNOSIS — Z74.09 IMPAIRED FUNCTIONAL MOBILITY, BALANCE, GAIT, AND ENDURANCE: ICD-10-CM

## 2018-03-02 DIAGNOSIS — M25.661 DECREASED RANGE OF MOTION OF RIGHT KNEE: ICD-10-CM

## 2018-03-02 LAB
ABO + RH BLD: NORMAL
ALBUMIN SERPL BCP-MCNC: 3.7 G/DL
ALP SERPL-CCNC: 60 U/L
ALT SERPL W/O P-5'-P-CCNC: 27 U/L
ANION GAP SERPL CALC-SCNC: 9 MMOL/L
AST SERPL-CCNC: 26 U/L
BASOPHILS # BLD AUTO: 0.05 K/UL
BASOPHILS NFR BLD: 0.6 %
BILIRUB SERPL-MCNC: 0.5 MG/DL
BILIRUB UR QL STRIP: NEGATIVE
BLD GP AB SCN CELLS X3 SERPL QL: NORMAL
BUN SERPL-MCNC: 23 MG/DL
CALCIUM SERPL-MCNC: 9.3 MG/DL
CHLORIDE SERPL-SCNC: 103 MMOL/L
CLARITY UR: CLEAR
CO2 SERPL-SCNC: 30 MMOL/L
COLOR UR: YELLOW
CREAT SERPL-MCNC: 1.3 MG/DL
DIFFERENTIAL METHOD: NORMAL
EOSINOPHIL # BLD AUTO: 0.5 K/UL
EOSINOPHIL NFR BLD: 5 %
ERYTHROCYTE [DISTWIDTH] IN BLOOD BY AUTOMATED COUNT: 14.5 %
EST. GFR  (AFRICAN AMERICAN): 60 ML/MIN/1.73 M^2
EST. GFR  (NON AFRICAN AMERICAN): 52 ML/MIN/1.73 M^2
GLUCOSE SERPL-MCNC: 83 MG/DL
GLUCOSE UR QL STRIP: NEGATIVE
HCT VFR BLD AUTO: 44.8 %
HGB BLD-MCNC: 14.5 G/DL
HGB UR QL STRIP: NEGATIVE
KETONES UR QL STRIP: NEGATIVE
LEUKOCYTE ESTERASE UR QL STRIP: NEGATIVE
LYMPHOCYTES # BLD AUTO: 2 K/UL
LYMPHOCYTES NFR BLD: 21.8 %
MCH RBC QN AUTO: 28.7 PG
MCHC RBC AUTO-ENTMCNC: 32.4 G/DL
MCV RBC AUTO: 89 FL
MONOCYTES # BLD AUTO: 0.8 K/UL
MONOCYTES NFR BLD: 8.6 %
NEUTROPHILS # BLD AUTO: 5.7 K/UL
NEUTROPHILS NFR BLD: 62.8 %
NITRITE UR QL STRIP: NEGATIVE
PH UR STRIP: 6 [PH] (ref 5–8)
PLATELET # BLD AUTO: 274 K/UL
PMV BLD AUTO: 9.9 FL
POTASSIUM SERPL-SCNC: 4 MMOL/L
PROT SERPL-MCNC: 6.8 G/DL
PROT UR QL STRIP: NEGATIVE
RBC # BLD AUTO: 5.05 M/UL
SODIUM SERPL-SCNC: 142 MMOL/L
SP GR UR STRIP: <=1.005 (ref 1–1.03)
URN SPEC COLLECT METH UR: ABNORMAL
UROBILINOGEN UR STRIP-ACNC: NEGATIVE EU/DL
WBC # BLD AUTO: 9.07 K/UL

## 2018-03-02 PROCEDURE — 85025 COMPLETE CBC W/AUTO DIFF WBC: CPT

## 2018-03-02 PROCEDURE — 80053 COMPREHEN METABOLIC PANEL: CPT

## 2018-03-02 PROCEDURE — 36415 COLL VENOUS BLD VENIPUNCTURE: CPT

## 2018-03-02 PROCEDURE — 81003 URINALYSIS AUTO W/O SCOPE: CPT

## 2018-03-02 PROCEDURE — 86901 BLOOD TYPING SEROLOGIC RH(D): CPT

## 2018-03-02 PROCEDURE — 87086 URINE CULTURE/COLONY COUNT: CPT

## 2018-03-02 PROCEDURE — 97110 THERAPEUTIC EXERCISES: CPT | Mod: PN

## 2018-03-02 RX ORDER — SODIUM CHLORIDE, SODIUM LACTATE, POTASSIUM CHLORIDE, CALCIUM CHLORIDE 600; 310; 30; 20 MG/100ML; MG/100ML; MG/100ML; MG/100ML
INJECTION, SOLUTION INTRAVENOUS CONTINUOUS
Status: CANCELLED | OUTPATIENT
Start: 2018-03-02

## 2018-03-02 NOTE — DISCHARGE INSTRUCTIONS
PRE-ADMIT TESTING -  [phone]    [address]  [place] [facility]          Your surgery has been scheduled at [facility]. We are pleased to have the opportunity to serve you. For Further Information please call [phone].    On the day of surgery please report to the Information Desk on the 1st floor.    · CONTACT YOUR PHYSICIAN'S OFFICE THE DAY PRIOR TO YOUR SURGERY TO OBTAIN YOUR ARRIVAL TIME.     · The evening before surgery do not eat anything after 9 p.m. ( this includes hard candy, chewing gum and mints).  You may only have GATORADE, POWERADE AND WATER  from 9 p.m. until you leave your home.   DO NOT DRINK ANY LIQUIDS ON THE WAY TO THE HOSPITAL.      SPECIAL MEDICATION INSTRUCTIONS: TAKE medications checked off by the Anesthesiologist on your Medication List.    Angiogram Patients: Take medications as instructed by your physician, including aspirin.     Surgery Patients:    If you take ASPIRIN - Your PHYSICIAN/SURGEON will need to inform you IF/OR when you need to stop taking aspirin prior to your surgery.     Do Not take any medications containing IBUPROFEN.  Do Not Wear any make-up or dark nail polish   (especially eye make-up) to surgery. If you come to surgery with makeup on you will be required to remove the makeup or nail polish.    Do not shave your surgical area at least 5 days prior to your surgery. The surgical prep will be performed at the hospital according to Infection Control regulations.    Leave all valuables at home.   Do Not wear any jewelry or watches, including any metal in body piercings.  Contact Lens must be removed before surgery. Either do not wear the contact lens or bring a case and solution for storage.  Please bring a container for eyeglasses or dentures as required.  Bring any paperwork your physician has provided, such as consent forms,  history and physicals, doctor's orders, etc.   Bring comfortable clothes that are loose fitting to wear upon  discharge. Take into consideration the type of surgery being performed.  Maintain your diet as advised per your physician the day prior to surgery.      Adequate rest the night before surgery is advised.   Park in the Parking lot behind the hospital or in the [place] [facility] across the street from the parking lot. Parking is complimentary.  If you will be discharged the same day as your procedure, please arrange for a responsible adult to drive you home or to accompany you if traveling by taxi.   YOU WILL NOT BE PERMITTED TO DRIVE OR TO LEAVE THE HOSPITAL ALONE AFTER SURGERY.   It is strongly recommended that you arrange for someone to remain with you for the first 24 hrs following your surgery.       Thank you for your cooperation.  The Staff of [facility].        Bathing Instructions                                                                 Please shower the evening before and morning of your procedure with    ANTIBACTERIAL SOAP. ( DIAL, etc )  Concentrate on the surgical area   for at least 3 minutes and rinse completely. Dry off as usual.   Do not use any deodorant, powder, body lotions, perfume, after shave or    cologne.

## 2018-03-02 NOTE — PROGRESS NOTES
Physical Therapy Daily Note     Name: Alan LOPEZ [facility] Number: [id number]  Diagnosis:   Encounter Diagnoses   Name Primary?    Chronic pain of right knee     Impaired functional mobility, balance, gait, and endurance     Muscle weakness     Decreased range of motion of right knee      Physician: Nicholas Gonzalez MD  Precautions: prehab   Visit #: 3 of 20  PTA Visit #: NA    Time In: 0922  Time Out: 1000  Total Treatment Time: 38'      Subjective     Pt reports: compliance with HEP. No other change in status.  Pain Scale: Alan LOPEZ rates pain on a scale of 0-10 to be 2 currently.    Objective     Alan LOPEZ received individual therapeutic exercises to develop strength, endurance, ROM, flexibility, posture and core stabilization for 38 minutes including:    Nustep x 2'- NP  Quad sets 5 sec hold x 20- NP  SLR 2  x 10  SL hip abd 2 x 10  HL hip add 2 x 10- NP  Supine hamstring stretch w strap 4 x 30 sec  Bridges 2 x 10   SAQ 2 x 10 x 2'' - NP  LAQ 2 x 10 x 2'' hold- NP  +Prone HS curls x 20    Standing Heel raises 2 x 10  Standing Marches 2 x 10   +Standing hip abd x 20  +Standing hip ext x 20    Sit <> stands 2 x 10- NP     Alan LOPEZ received the following manual therapy techniques: patella mobs were applied to the: right knee for 0 minutes including:    Written Home Exercises Reviewed.  Pt demo good understanding of the education provided. Alan LOPEZ demonstrated good return demonstration of activities.     Education provided re: Compliance with HEP.     Alan LOPEZ verbalized good understanding of education provided.   No spiritual or educational barriers to learning provided    Assessment     Alan had good tolerance to short session today with no adverse effects. Post-treatment knee pain rated as 2/10. Pt with difficulties with bed mobility and LE weakness. He was not able to perform weight squats due to knee pain. Will continue to progress LE  strengthening and mobility to tolerance.     This is a 77 y.o. male referred to outpatient physical therapy and presents with a medical diagnosis of R knee pain and demonstrates limitations as described in the problem list. Pt prognosis is Good. Pt will continue to benefit from skilled outpatient physical therapy to address the deficits listed in the problem list, provide pt/family education and to maximize pt's level of independence in the home and community environment.     Goals as follows:  GOALS: Short Term Goals: 2 weeks  1. Report decreased R knee pain  <  / =  5/10 at worst to increase tolerance for walking.  2. Pt will be able to tolerate multi-directional LE strengthening in order to improve ability to perform household chores.  3. Pt will report 50% improvement in ability to walk long distances since start of care to indicate improved functional mobility.   4. Pt will ambulate 200' demonstrating only mild gait deviations to improve gait pattern prior to TKA.   5. Pt to tolerate HEP to improve ROM and independence with ADL's     Plan     Continue with established Plan of Care towards PT goals.    Therapist: Negrita Dumont, PT  3/2/2018

## 2018-03-02 NOTE — ANESTHESIA PREPROCEDURE EVALUATION
03/02/2018  Alan Gutiérrez is a 77 y.o., male.    Anesthesia Evaluation    I have reviewed the Patient Summary Reports.    I have reviewed the Nursing Notes.   I have reviewed the Medications.     Review of Systems  Anesthesia Hx:  No problems with previous Anesthesia  Denies Family Hx of Anesthesia complications.   Denies Personal Hx of Anesthesia complications.   Social:  Non-Smoker    Hematology/Oncology:  Hematology Normal       -- Cancer in past history:  Oncology Comments: skin     EENT/Dental:EENT/Dental Normal   Cardiovascular:   Exercise tolerance: good Hypertension Dysrhythmias atrial fibrillation CHF PVD    Pulmonary:  Pulmonary Normal    Renal/:  Renal/ Normal     Musculoskeletal:   Arthritis   Spine Disorders: lumbar Degenerative disease    Neurological:   CVA, no residual symptoms CVA x 2 2004   Endocrine:  Endocrine Normal    Dermatological:  Skin Normal    Psych:  Psychiatric Normal           Physical Exam  General:  Well nourished    Airway/Jaw/Neck:  Airway Findings: Mouth Opening: Normal Tongue: Normal  General Airway Assessment: Adult  Mallampati: I  TM Distance: Normal, at least 6 cm  Jaw/Neck Findings:  Neck ROM: Normal ROM      Dental:  Dental Findings: In tact      Musculoskeletal:  Musculoskeletal Findings: Lumbar Lordosis          Anesthesia Plan  Type of Anesthesia, risks & benefits discussed:  Anesthesia Type:  spinal  Patient's Preference:   Intra-op Monitoring Plan:   Intra-op Monitoring Plan Comments:   Post Op Pain Control Plan:   Post Op Pain Control Plan Comments:   Induction:   IV  Beta Blocker:         Informed Consent: Patient understands risks and agrees with Anesthesia plan.  Questions answered. Anesthesia consent signed with patient.  ASA Score: 3     Day of Surgery Review of History & Physical:    H&P update referred to the surgeon.     Anesthesia Plan Notes:  Clearance and H and P by Dr. Nguyen paper on chart        Ready For Surgery From Anesthesia Perspective.

## 2018-03-04 LAB — BACTERIA UR CULT: NO GROWTH

## 2018-03-05 ENCOUNTER — CLINICAL SUPPORT (OUTPATIENT)
Dept: REHABILITATION | Facility: HOSPITAL | Age: 78
End: 2018-03-05
Payer: MEDICARE

## 2018-03-05 DIAGNOSIS — G89.29 CHRONIC PAIN OF RIGHT KNEE: ICD-10-CM

## 2018-03-05 DIAGNOSIS — Z74.09 IMPAIRED FUNCTIONAL MOBILITY, BALANCE, GAIT, AND ENDURANCE: ICD-10-CM

## 2018-03-05 DIAGNOSIS — M62.81 MUSCLE WEAKNESS: ICD-10-CM

## 2018-03-05 DIAGNOSIS — M25.561 CHRONIC PAIN OF RIGHT KNEE: ICD-10-CM

## 2018-03-05 DIAGNOSIS — M25.661 DECREASED RANGE OF MOTION OF RIGHT KNEE: ICD-10-CM

## 2018-03-05 PROCEDURE — 97110 THERAPEUTIC EXERCISES: CPT | Mod: PN

## 2018-03-05 NOTE — PROGRESS NOTES
Physical Therapy Daily Note     Name: Alan LOPEZ [facility] Number: [id number]  Diagnosis:   Encounter Diagnoses   Name Primary?    Chronic pain of right knee     Impaired functional mobility, balance, gait, and endurance     Muscle weakness     Decreased range of motion of right knee      Physician: Nicholas Gonzalez MD Eval date: 2/26/18  Last G-CODE/PN: eval  Visit #: 4 of 20  PTA Visit #: NA    Time In: 1003  Time Out: 1048  Total Treatment Time: 45'      Precautions: prehab   Subjective     Pt reports: no new complaints. No soreness following last treatment. He is compliant with HEP.  Pain Scale: Alan LOPEZ rates pain on a scale of 0-10 to be 2 currently.    Objective     Alan LOPEZ received individual therapeutic exercises to develop strength, endurance, ROM, flexibility, posture and core stabilization for 40 minutes including:    Nustep x 2'- NP  Quad sets 5 sec hold x 20- NP  SLR 2# 2 x 10  Supine heel slides x 20  SL clams x 20  SL hip abd 2# 2 x 10  SL hip add 2# 2 x 10  Supine hamstring stretch w strap 3 x 30 sec  Bridges 2 x 10   SAQ 2# 2 x 10  LAQ 2# 2 x 10 x 2'' hold  Prone HS curls 2# x 20  Prone hip ext 2# x 20    Standing Heel raises 2 x 10  Standing Marches 2# 2 x 10   Standing hip abd 2# x 20  Standing hip ext 2# x 20    Sit <> stands from blue mat 2 x 10    Alan LOPEZ received the following manual therapy techniques: patella mobs were applied to the: right knee for 5 minutes including:    Written Home Exercises Reviewed.  Pt demo good understanding of the education provided. Alan LOPEZ demonstrated good return demonstration of activities.     Education provided re: Compliance with HEP.     Alan LOPEZ verbalized good understanding of education provided.   No spiritual or educational barriers to learning provided    Assessment     Alan was re-assessed today with 3/5 STGs being met indicating improvements in R knee pain, LE strengthening, and  tolerance for HEP since start of care. Pt continues with gait pattern deficits and knee pain limiting functional mobility. TKA planned for Friday. Pt will be seen for 2 more treatment sessions prior to surgery to continue LE strengthening. Discharge on Thursday if no change in status present.     He had good tolerance to treatment today with exacerbation of symptoms. Post-treatment pain rated as 2/10. Pt with good response to addition of ankle weight with therapeutic exercises. He continues to demonstrate good independence with exercises indicating good HEP compliance. Slight improvement in knee flexion AROM with heel slides.     This is a 77 y.o. male referred to outpatient physical therapy and presents with a medical diagnosis of R knee pain and demonstrates limitations as described in the problem list. Pt prognosis is Good. Pt will continue to benefit from skilled outpatient physical therapy to address the deficits listed in the problem list, provide pt/family education and to maximize pt's level of independence in the home and community environment.     Goals as follows:  GOALS: Short Term Goals: 2 weeks  1. Report decreased R knee pain  <  / =  5/10 at worst to increase tolerance for walking.- met 3/5/18  2. Pt will be able to tolerate multi-directional LE strengthening in order to improve ability to perform household chores.- met 3/5/18  3. Pt will report 50% improvement in ability to walk long distances since start of care to indicate improved functional mobility.- in progress   4. Pt will ambulate 200' demonstrating only mild gait deviations to improve gait pattern prior to TKA.- in progress   5. Pt to tolerate HEP to improve ROM and independence with ADL's- met 3/5/18     Plan     Continue with established Plan of Care towards PT goals.    Therapist: Negrita Dumont, PT  3/5/2018

## 2018-03-07 ENCOUNTER — CLINICAL SUPPORT (OUTPATIENT)
Dept: REHABILITATION | Facility: HOSPITAL | Age: 78
End: 2018-03-07
Payer: MEDICARE

## 2018-03-07 DIAGNOSIS — M25.661 DECREASED RANGE OF MOTION OF RIGHT KNEE: ICD-10-CM

## 2018-03-07 DIAGNOSIS — Z74.09 IMPAIRED FUNCTIONAL MOBILITY, BALANCE, GAIT, AND ENDURANCE: ICD-10-CM

## 2018-03-07 DIAGNOSIS — M25.561 CHRONIC PAIN OF RIGHT KNEE: Primary | ICD-10-CM

## 2018-03-07 DIAGNOSIS — G89.29 CHRONIC PAIN OF RIGHT KNEE: Primary | ICD-10-CM

## 2018-03-07 DIAGNOSIS — M62.81 MUSCLE WEAKNESS: ICD-10-CM

## 2018-03-07 PROCEDURE — 97110 THERAPEUTIC EXERCISES: CPT | Mod: PN

## 2018-03-07 NOTE — PROGRESS NOTES
Physical Therapy Daily Note     Name: Alan LOPEZ [facility] Number: [id number]  Diagnosis:   Encounter Diagnoses   Name Primary?    Chronic pain of right knee Yes    Impaired functional mobility, balance, gait, and endurance     Muscle weakness     Decreased range of motion of right knee      Physician: Nicholas Gonzalez MD Eval date: 2/26/18  Last G-CODE/PN: eval  Visit #: 5 of 20  PTA Visit #: 1    Time In: 1000  Time Out: 1050  Total Treatment Time:  50'     Precautions: prehab   Subjective     Pt reports: that he is doing well today.  Pt reports some soreness after last session but is better today.  Pt stated that he is having his TKR on Friday.   Pain Scale: Alan LOPEZ rates pain on a scale of 0-10 to be 0 currently.    Objective     Alan LOPEZ received individual therapeutic exercises to develop strength, endurance, ROM, flexibility, posture and core stabilization for 40 minutes including:    Nustep x 2'- NP  Quad sets 5 sec hold x 20- NP  SLR 2# 2 x 10  Supine heel slides x 20  SL clams x 20  SL hip abd 2# 2 x 10  SL hip add 2# 2 x 10  Supine hamstring stretch w strap 3 x 30 sec  Bridges 2 x 10   SAQ 2# 2 x 10  LAQ 2# 2 x 10 x 2'' hold  Prone HS curls 2# x 20  Prone hip ext 2# x 20    Standing Heel raises 2 x 10  Standing Marches 2# 2 x 10   + Standing hip flexion 2 x 10 2#  Standing hip abd 2# x 20  Standing hip ext 2# x 20    Sit <> stands from blue mat 2 x 10    Alan LOPEZ received the following manual therapy techniques: patella mobs were applied to the: right knee for 0 minutes including:    Written Home Exercises Reviewed.  Pt demo good understanding of the education provided. Alan LOPEZ demonstrated good return demonstration of activities.     Education provided re: Compliance with HEP.     Alan LOPEZ verbalized good understanding of education provided.   No spiritual or educational barriers to learning provided    Assessment     Alan ospina  treatment session well today.  Patient with good tolerance to exercises without provocation of pain.  Patient required minimal verbal cueing to perform exercises in a slow and controlled motion.   This is a 77 y.o. male referred to outpatient physical therapy and presents with a medical diagnosis of R knee pain and demonstrates limitations as described in the problem list. Pt prognosis is Good. Pt will continue to benefit from skilled outpatient physical therapy to address the deficits listed in the problem list, provide pt/family education and to maximize pt's level of independence in the home and community environment.     Goals as follows:  GOALS: Short Term Goals: 2 weeks  1. Report decreased R knee pain  <  / =  5/10 at worst to increase tolerance for walking.  2. Pt will be able to tolerate multi-directional LE strengthening in order to improve ability to perform household chores.  3. Pt will report 50% improvement in ability to walk long distances since start of care to indicate improved functional mobility.   4. Pt will ambulate 200' demonstrating only mild gait deviations to improve gait pattern prior to TKA.   5. Pt to tolerate HEP to improve ROM and independence with ADL's     Plan     Continue with established Plan of Care towards PT goals.    Therapist: Cheri Agosto, PTA  3/7/2018

## 2018-03-08 ENCOUNTER — CLINICAL SUPPORT (OUTPATIENT)
Dept: REHABILITATION | Facility: HOSPITAL | Age: 78
End: 2018-03-08
Payer: MEDICARE

## 2018-03-08 DIAGNOSIS — G89.29 CHRONIC PAIN OF RIGHT KNEE: ICD-10-CM

## 2018-03-08 DIAGNOSIS — M25.661 DECREASED RANGE OF MOTION OF RIGHT KNEE: ICD-10-CM

## 2018-03-08 DIAGNOSIS — M25.561 CHRONIC PAIN OF RIGHT KNEE: ICD-10-CM

## 2018-03-08 DIAGNOSIS — Z74.09 IMPAIRED FUNCTIONAL MOBILITY, BALANCE, GAIT, AND ENDURANCE: ICD-10-CM

## 2018-03-08 DIAGNOSIS — M62.81 MUSCLE WEAKNESS: ICD-10-CM

## 2018-03-08 PROCEDURE — 97110 THERAPEUTIC EXERCISES: CPT | Mod: PN

## 2018-03-08 NOTE — PROGRESS NOTES
Physical Therapy Daily Note     Name: Alan LOPEZ [facility] Number: [id number]  Diagnosis:   Encounter Diagnoses   Name Primary?    Chronic pain of right knee     Impaired functional mobility, balance, gait, and endurance     Muscle weakness     Decreased range of motion of right knee      Physician: Nicholas Gonzalez MD Eval date: 2/26/18  Last G-CODE/PN: eval  Visit #: 6 of 20  PTA Visit #: 2    Time In: 0930  Time Out: 1002  Total Treatment Time:  32 minutes (1:1 with PTA duration of treatment session)    Precautions: prehab     Subjective     Pt reports: Mr. Phillips reports having his usual pain this morning in his knee. Patient states that he has surgery tomorrow for a total knee replacement.   Pain Scale: Alan LOPEZ rates pain on a scale of 0-10 to be 2 currently, R knee.    Objective     Alan LOPEZ received individual therapeutic exercises to develop strength, endurance, ROM, flexibility, posture and core stabilization for 32 minutes including:    Nustep x 2' - NP  Quad sets 5 sec hold x 20- NP  SLR 2# 2 x 10  Supine heel slides x 20  SL clams x 20  SL hip abd 2# 2 x 10  SL hip add 2# 2 x 10  Supine hamstring stretch w strap 3 x 30 sec  Bridges 2 x 10   SAQ 2# 2 x 10  LAQ 2# 2 x 10 x 2'' hold  Prone HS curls 2# x 20  Prone hip ext 2# x 20    Standing Heel raises 2 x 10  Standing Marches 2# 2 x 10   Standing hip flexion 2 x 10 2#  Standing hip abd 2# x 20  Standing hip ext 2# x 20    Sit <> stands from blue mat 2 x 10    Alan LOPEZ received the following manual therapy techniques: patella mobs were applied to the: right knee for 0 minutes including:    Written Home Exercises Reviewed.  Pt demo good understanding of the education provided. Alan LOPEZ demonstrated good return demonstration of activities.     Education provided re: Compliance with HEP.     Alan LOPEZ verbalized good understanding of education provided.   No spiritual or educational barriers to  learning provided    Assessment     Alan tolerated treatment session very well today with no provocation of pain. Minimal verbal cueing required for proper technique and muscle activation. Patient reported appropriate muscle fatigue at the end of session.   This is a 77 y.o. male referred to outpatient physical therapy and presents with a medical diagnosis of R knee pain and demonstrates limitations as described in the problem list. Pt prognosis is Good. Pt will continue to benefit from skilled outpatient physical therapy to address the deficits listed in the problem list, provide pt/family education and to maximize pt's level of independence in the home and community environment.     Goals as follows:  GOALS: Short Term Goals: 2 weeks  1. Report decreased R knee pain  <  / =  5/10 at worst to increase tolerance for walking.  2. Pt will be able to tolerate multi-directional LE strengthening in order to improve ability to perform household chores.  3. Pt will report 50% improvement in ability to walk long distances since start of care to indicate improved functional mobility.   4. Pt will ambulate 200' demonstrating only mild gait deviations to improve gait pattern prior to TKA.   5. Pt to tolerate HEP to improve ROM and independence with ADL's     Plan     Continue with established Plan of Care towards PT goals.    Therapist: Michelle Ray, PTA  3/8/2018

## 2018-03-09 ENCOUNTER — HOSPITAL ENCOUNTER (INPATIENT)
Facility: OTHER | Age: 78
LOS: 2 days | Discharge: HOME HEALTH | DRG: 470 | End: 2018-03-11
Attending: ORTHOPAEDIC SURGERY | Admitting: ORTHOPAEDIC SURGERY
Payer: MEDICARE

## 2018-03-09 ENCOUNTER — ANESTHESIA (OUTPATIENT)
Dept: SURGERY | Facility: OTHER | Age: 78
DRG: 470 | End: 2018-03-09
Payer: MEDICARE

## 2018-03-09 ENCOUNTER — DOCUMENTATION ONLY (OUTPATIENT)
Dept: REHABILITATION | Facility: HOSPITAL | Age: 78
End: 2018-03-09

## 2018-03-09 DIAGNOSIS — M17.11 PRIMARY OSTEOARTHRITIS OF RIGHT KNEE: ICD-10-CM

## 2018-03-09 DIAGNOSIS — M17.11 PRIMARY LOCALIZED OSTEOARTHROSIS OF THE KNEE, RIGHT: Primary | ICD-10-CM

## 2018-03-09 DIAGNOSIS — I48.20 CHRONIC ATRIAL FIBRILLATION: ICD-10-CM

## 2018-03-09 PROCEDURE — 94799 UNLISTED PULMONARY SVC/PX: CPT

## 2018-03-09 PROCEDURE — 36000711: Performed by: ORTHOPAEDIC SURGERY

## 2018-03-09 PROCEDURE — 25000003 PHARM REV CODE 250: Performed by: ORTHOPAEDIC SURGERY

## 2018-03-09 PROCEDURE — 63600175 PHARM REV CODE 636 W HCPCS: Performed by: ORTHOPAEDIC SURGERY

## 2018-03-09 PROCEDURE — 0SRC0J9 REPLACEMENT OF RIGHT KNEE JOINT WITH SYNTHETIC SUBSTITUTE, CEMENTED, OPEN APPROACH: ICD-10-PCS | Performed by: ORTHOPAEDIC SURGERY

## 2018-03-09 PROCEDURE — C9290 INJ, BUPIVACAINE LIPOSOME: HCPCS | Performed by: ORTHOPAEDIC SURGERY

## 2018-03-09 PROCEDURE — 63600175 PHARM REV CODE 636 W HCPCS

## 2018-03-09 PROCEDURE — 25000003 PHARM REV CODE 250: Performed by: ANESTHESIOLOGY

## 2018-03-09 PROCEDURE — 36000710: Performed by: ORTHOPAEDIC SURGERY

## 2018-03-09 PROCEDURE — 37000008 HC ANESTHESIA 1ST 15 MINUTES: Performed by: ORTHOPAEDIC SURGERY

## 2018-03-09 PROCEDURE — C1729 CATH, DRAINAGE: HCPCS | Performed by: ORTHOPAEDIC SURGERY

## 2018-03-09 PROCEDURE — C1713 ANCHOR/SCREW BN/BN,TIS/BN: HCPCS | Performed by: ORTHOPAEDIC SURGERY

## 2018-03-09 PROCEDURE — 97116 GAIT TRAINING THERAPY: CPT

## 2018-03-09 PROCEDURE — 25000003 PHARM REV CODE 250: Performed by: NURSE PRACTITIONER

## 2018-03-09 PROCEDURE — 37000009 HC ANESTHESIA EA ADD 15 MINS: Performed by: ORTHOPAEDIC SURGERY

## 2018-03-09 PROCEDURE — 11000001 HC ACUTE MED/SURG PRIVATE ROOM

## 2018-03-09 PROCEDURE — 25000003 PHARM REV CODE 250

## 2018-03-09 PROCEDURE — 25000003 PHARM REV CODE 250: Performed by: NURSE ANESTHETIST, CERTIFIED REGISTERED

## 2018-03-09 PROCEDURE — 71000033 HC RECOVERY, INTIAL HOUR: Performed by: ORTHOPAEDIC SURGERY

## 2018-03-09 PROCEDURE — C1776 JOINT DEVICE (IMPLANTABLE): HCPCS | Performed by: ORTHOPAEDIC SURGERY

## 2018-03-09 PROCEDURE — 94761 N-INVAS EAR/PLS OXIMETRY MLT: CPT

## 2018-03-09 PROCEDURE — 63600175 PHARM REV CODE 636 W HCPCS: Performed by: NURSE ANESTHETIST, CERTIFIED REGISTERED

## 2018-03-09 PROCEDURE — 63600175 PHARM REV CODE 636 W HCPCS: Performed by: SPECIALIST

## 2018-03-09 PROCEDURE — 97110 THERAPEUTIC EXERCISES: CPT

## 2018-03-09 PROCEDURE — 71000039 HC RECOVERY, EACH ADD'L HOUR: Performed by: ORTHOPAEDIC SURGERY

## 2018-03-09 PROCEDURE — 97161 PT EVAL LOW COMPLEX 20 MIN: CPT

## 2018-03-09 PROCEDURE — 27201423 OPTIME MED/SURG SUP & DEVICES STERILE SUPPLY: Performed by: ORTHOPAEDIC SURGERY

## 2018-03-09 DEVICE — IMPLANTABLE DEVICE: Type: IMPLANTABLE DEVICE | Site: KNEE | Status: FUNCTIONAL

## 2018-03-09 DEVICE — CEMENT BONE RDPQ 40G PDR 20ML: Type: IMPLANTABLE DEVICE | Site: KNEE | Status: FUNCTIONAL

## 2018-03-09 DEVICE — TIBIAL BASEPLATE CEMENTED #4: Type: IMPLANTABLE DEVICE | Site: KNEE | Status: FUNCTIONAL

## 2018-03-09 DEVICE — PATELLA XPE X-LARGE 38MM: Type: IMPLANTABLE DEVICE | Site: KNEE | Status: FUNCTIONAL

## 2018-03-09 RX ORDER — ONDANSETRON 2 MG/ML
4 INJECTION INTRAMUSCULAR; INTRAVENOUS EVERY 12 HOURS PRN
Status: DISCONTINUED | OUTPATIENT
Start: 2018-03-09 | End: 2018-03-11 | Stop reason: HOSPADM

## 2018-03-09 RX ORDER — CEFAZOLIN SODIUM 2 G/50ML
2 SOLUTION INTRAVENOUS
Status: COMPLETED | OUTPATIENT
Start: 2018-03-09 | End: 2018-03-09

## 2018-03-09 RX ORDER — OXYCODONE HYDROCHLORIDE 5 MG/1
5 TABLET ORAL EVERY 4 HOURS PRN
Status: DISCONTINUED | OUTPATIENT
Start: 2018-03-09 | End: 2018-03-11 | Stop reason: HOSPADM

## 2018-03-09 RX ORDER — FINASTERIDE 5 MG/1
5 TABLET, FILM COATED ORAL DAILY
Status: DISCONTINUED | OUTPATIENT
Start: 2018-03-09 | End: 2018-03-11 | Stop reason: HOSPADM

## 2018-03-09 RX ORDER — ACETAMINOPHEN 10 MG/ML
1000 INJECTION, SOLUTION INTRAVENOUS EVERY 8 HOURS
Status: COMPLETED | OUTPATIENT
Start: 2018-03-09 | End: 2018-03-10

## 2018-03-09 RX ORDER — PROPOFOL 10 MG/ML
VIAL (ML) INTRAVENOUS CONTINUOUS PRN
Status: DISCONTINUED | OUTPATIENT
Start: 2018-03-09 | End: 2018-03-09

## 2018-03-09 RX ORDER — METOPROLOL SUCCINATE 50 MG/1
50 TABLET, EXTENDED RELEASE ORAL DAILY
Status: DISCONTINUED | OUTPATIENT
Start: 2018-03-09 | End: 2018-03-11 | Stop reason: HOSPADM

## 2018-03-09 RX ORDER — ONDANSETRON 2 MG/ML
INJECTION INTRAMUSCULAR; INTRAVENOUS
Status: DISCONTINUED | OUTPATIENT
Start: 2018-03-09 | End: 2018-03-09

## 2018-03-09 RX ORDER — ACETAMINOPHEN 10 MG/ML
INJECTION, SOLUTION INTRAVENOUS
Status: DISPENSED
Start: 2018-03-09 | End: 2018-03-10

## 2018-03-09 RX ORDER — NIFEDIPINE 30 MG/1
60 TABLET, EXTENDED RELEASE ORAL NIGHTLY
Status: DISCONTINUED | OUTPATIENT
Start: 2018-03-09 | End: 2018-03-11 | Stop reason: HOSPADM

## 2018-03-09 RX ORDER — DIPHENHYDRAMINE HYDROCHLORIDE 50 MG/ML
25 INJECTION INTRAMUSCULAR; INTRAVENOUS EVERY 8 HOURS PRN
Status: DISCONTINUED | OUTPATIENT
Start: 2018-03-09 | End: 2018-03-11 | Stop reason: HOSPADM

## 2018-03-09 RX ORDER — DIGOXIN 125 MCG
TABLET ORAL
Status: DISPENSED
Start: 2018-03-09 | End: 2018-03-10

## 2018-03-09 RX ORDER — OXYCODONE HYDROCHLORIDE 5 MG/1
10 TABLET ORAL EVERY 4 HOURS PRN
Status: DISCONTINUED | OUTPATIENT
Start: 2018-03-09 | End: 2018-03-11 | Stop reason: HOSPADM

## 2018-03-09 RX ORDER — DEXTROSE MONOHYDRATE AND SODIUM CHLORIDE 5; .9 G/100ML; G/100ML
INJECTION, SOLUTION INTRAVENOUS CONTINUOUS
Status: DISCONTINUED | OUTPATIENT
Start: 2018-03-09 | End: 2018-03-11 | Stop reason: HOSPADM

## 2018-03-09 RX ORDER — LOSARTAN POTASSIUM 50 MG/1
100 TABLET ORAL DAILY
Status: DISCONTINUED | OUTPATIENT
Start: 2018-03-09 | End: 2018-03-11 | Stop reason: HOSPADM

## 2018-03-09 RX ORDER — CELECOXIB 200 MG/1
200 CAPSULE ORAL 2 TIMES DAILY
Status: DISCONTINUED | OUTPATIENT
Start: 2018-03-09 | End: 2018-03-09

## 2018-03-09 RX ORDER — DIGOXIN 125 MCG
0.12 TABLET ORAL DAILY
Status: DISCONTINUED | OUTPATIENT
Start: 2018-03-09 | End: 2018-03-11 | Stop reason: HOSPADM

## 2018-03-09 RX ORDER — HYDROMORPHONE HYDROCHLORIDE 1 MG/ML
0.5 INJECTION, SOLUTION INTRAMUSCULAR; INTRAVENOUS; SUBCUTANEOUS EVERY 4 HOURS PRN
Status: DISPENSED | OUTPATIENT
Start: 2018-03-09 | End: 2018-03-10

## 2018-03-09 RX ORDER — KETOROLAC TROMETHAMINE 30 MG/ML
INJECTION, SOLUTION INTRAMUSCULAR; INTRAVENOUS
Status: DISCONTINUED | OUTPATIENT
Start: 2018-03-09 | End: 2018-03-09 | Stop reason: HOSPADM

## 2018-03-09 RX ORDER — NIFEDIPINE 30 MG/1
30 TABLET, EXTENDED RELEASE ORAL EVERY MORNING
Status: DISCONTINUED | OUTPATIENT
Start: 2018-03-09 | End: 2018-03-11 | Stop reason: HOSPADM

## 2018-03-09 RX ORDER — ONDANSETRON 2 MG/ML
4 INJECTION INTRAMUSCULAR; INTRAVENOUS DAILY PRN
Status: DISCONTINUED | OUTPATIENT
Start: 2018-03-09 | End: 2018-03-09 | Stop reason: HOSPADM

## 2018-03-09 RX ORDER — MEPERIDINE HYDROCHLORIDE 50 MG/ML
12.5 INJECTION INTRAMUSCULAR; INTRAVENOUS; SUBCUTANEOUS ONCE AS NEEDED
Status: DISCONTINUED | OUTPATIENT
Start: 2018-03-09 | End: 2018-03-09 | Stop reason: HOSPADM

## 2018-03-09 RX ORDER — MUPIROCIN 20 MG/G
1 OINTMENT TOPICAL 2 TIMES DAILY
Status: DISCONTINUED | OUTPATIENT
Start: 2018-03-09 | End: 2018-03-11 | Stop reason: HOSPADM

## 2018-03-09 RX ORDER — ALLOPURINOL 100 MG/1
TABLET ORAL
Status: DISPENSED
Start: 2018-03-09 | End: 2018-03-10

## 2018-03-09 RX ORDER — ACETAMINOPHEN 10 MG/ML
1000 INJECTION, SOLUTION INTRAVENOUS
Status: COMPLETED | OUTPATIENT
Start: 2018-03-09 | End: 2018-03-09

## 2018-03-09 RX ORDER — FENTANYL CITRATE 50 UG/ML
25 INJECTION, SOLUTION INTRAMUSCULAR; INTRAVENOUS EVERY 5 MIN PRN
Status: DISCONTINUED | OUTPATIENT
Start: 2018-03-09 | End: 2018-03-09 | Stop reason: HOSPADM

## 2018-03-09 RX ORDER — DOCUSATE SODIUM 100 MG/1
100 CAPSULE, LIQUID FILLED ORAL EVERY 12 HOURS
Status: DISCONTINUED | OUTPATIENT
Start: 2018-03-09 | End: 2018-03-11 | Stop reason: HOSPADM

## 2018-03-09 RX ORDER — BACITRACIN 50000 [IU]/1
INJECTION, POWDER, FOR SOLUTION INTRAMUSCULAR
Status: DISCONTINUED | OUTPATIENT
Start: 2018-03-09 | End: 2018-03-09 | Stop reason: HOSPADM

## 2018-03-09 RX ORDER — PHENYLEPHRINE HYDROCHLORIDE 10 MG/ML
INJECTION INTRAVENOUS
Status: DISCONTINUED | OUTPATIENT
Start: 2018-03-09 | End: 2018-03-09

## 2018-03-09 RX ORDER — DIPHENHYDRAMINE HYDROCHLORIDE 50 MG/ML
25 INJECTION INTRAMUSCULAR; INTRAVENOUS EVERY 6 HOURS PRN
Status: DISCONTINUED | OUTPATIENT
Start: 2018-03-09 | End: 2018-03-09 | Stop reason: HOSPADM

## 2018-03-09 RX ORDER — OXYCODONE AND ACETAMINOPHEN 5; 325 MG/1; MG/1
TABLET ORAL
Qty: 90 TABLET | Refills: 0 | Status: SHIPPED | OUTPATIENT
Start: 2018-03-09 | End: 2022-08-26

## 2018-03-09 RX ORDER — CLONIDINE HYDROCHLORIDE 0.1 MG/1
0.1 TABLET ORAL 3 TIMES DAILY PRN
Status: DISCONTINUED | OUTPATIENT
Start: 2018-03-09 | End: 2018-03-11 | Stop reason: HOSPADM

## 2018-03-09 RX ORDER — ROSUVASTATIN CALCIUM 10 MG/1
TABLET, COATED ORAL
Status: DISPENSED
Start: 2018-03-09 | End: 2018-03-10

## 2018-03-09 RX ORDER — SODIUM CHLORIDE, SODIUM LACTATE, POTASSIUM CHLORIDE, CALCIUM CHLORIDE 600; 310; 30; 20 MG/100ML; MG/100ML; MG/100ML; MG/100ML
INJECTION, SOLUTION INTRAVENOUS CONTINUOUS
Status: DISCONTINUED | OUTPATIENT
Start: 2018-03-09 | End: 2018-03-09

## 2018-03-09 RX ORDER — BUPIVACAINE HYDROCHLORIDE 2.5 MG/ML
INJECTION, SOLUTION EPIDURAL; INFILTRATION; INTRACAUDAL
Status: DISCONTINUED | OUTPATIENT
Start: 2018-03-09 | End: 2018-03-09 | Stop reason: HOSPADM

## 2018-03-09 RX ORDER — HYDROMORPHONE HYDROCHLORIDE 2 MG/ML
0.4 INJECTION, SOLUTION INTRAMUSCULAR; INTRAVENOUS; SUBCUTANEOUS EVERY 5 MIN PRN
Status: DISCONTINUED | OUTPATIENT
Start: 2018-03-09 | End: 2018-03-09 | Stop reason: HOSPADM

## 2018-03-09 RX ORDER — POLYETHYLENE GLYCOL 3350 17 G/17G
17 POWDER, FOR SOLUTION ORAL DAILY
Status: DISCONTINUED | OUTPATIENT
Start: 2018-03-10 | End: 2018-03-11 | Stop reason: HOSPADM

## 2018-03-09 RX ORDER — CEFAZOLIN SODIUM 1 G/3ML
1 INJECTION, POWDER, FOR SOLUTION INTRAMUSCULAR; INTRAVENOUS
Status: COMPLETED | OUTPATIENT
Start: 2018-03-09 | End: 2018-03-09

## 2018-03-09 RX ORDER — CEFAZOLIN SODIUM 2 G/50ML
SOLUTION INTRAVENOUS
Status: DISPENSED
Start: 2018-03-09 | End: 2018-03-10

## 2018-03-09 RX ORDER — SODIUM CHLORIDE 0.9 % (FLUSH) 0.9 %
3 SYRINGE (ML) INJECTION
Status: DISCONTINUED | OUTPATIENT
Start: 2018-03-09 | End: 2018-03-11 | Stop reason: HOSPADM

## 2018-03-09 RX ORDER — TRANEXAMIC ACID 100 MG/ML
INJECTION, SOLUTION INTRAVENOUS
Status: DISCONTINUED | OUTPATIENT
Start: 2018-03-09 | End: 2018-03-09

## 2018-03-09 RX ORDER — ALLOPURINOL 100 MG/1
100 TABLET ORAL DAILY
Status: DISCONTINUED | OUTPATIENT
Start: 2018-03-09 | End: 2018-03-11 | Stop reason: HOSPADM

## 2018-03-09 RX ORDER — FAMOTIDINE 20 MG/1
20 TABLET, FILM COATED ORAL 2 TIMES DAILY
Status: DISCONTINUED | OUTPATIENT
Start: 2018-03-09 | End: 2018-03-11 | Stop reason: HOSPADM

## 2018-03-09 RX ORDER — ROPIVACAINE HYDROCHLORIDE 5 MG/ML
INJECTION, SOLUTION EPIDURAL; INFILTRATION; PERINEURAL
Status: DISCONTINUED | OUTPATIENT
Start: 2018-03-09 | End: 2018-03-09

## 2018-03-09 RX ORDER — OXYCODONE HYDROCHLORIDE 5 MG/1
5 TABLET ORAL
Status: DISCONTINUED | OUTPATIENT
Start: 2018-03-09 | End: 2018-03-09 | Stop reason: HOSPADM

## 2018-03-09 RX ORDER — ROSUVASTATIN CALCIUM 10 MG/1
40 TABLET, COATED ORAL DAILY
Status: DISCONTINUED | OUTPATIENT
Start: 2018-03-09 | End: 2018-03-11 | Stop reason: HOSPADM

## 2018-03-09 RX ORDER — SODIUM CHLORIDE 0.9 % (FLUSH) 0.9 %
5 SYRINGE (ML) INJECTION
Status: DISCONTINUED | OUTPATIENT
Start: 2018-03-09 | End: 2018-03-11 | Stop reason: HOSPADM

## 2018-03-09 RX ADMIN — CEFAZOLIN SODIUM 2 G: 2 SOLUTION INTRAVENOUS at 03:03

## 2018-03-09 RX ADMIN — VANCOMYCIN HYDROCHLORIDE 1000 MG: 1 INJECTION, POWDER, LYOPHILIZED, FOR SOLUTION INTRAVENOUS at 06:03

## 2018-03-09 RX ADMIN — TRANEXAMIC ACID 1000 MG: 100 INJECTION, SOLUTION INTRAVENOUS at 07:03

## 2018-03-09 RX ADMIN — DEXTROSE MONOHYDRATE AND SODIUM CHLORIDE: 5; .9 INJECTION, SOLUTION INTRAVENOUS at 09:03

## 2018-03-09 RX ADMIN — DOCUSATE SODIUM 100 MG: 100 CAPSULE, LIQUID FILLED ORAL at 08:03

## 2018-03-09 RX ADMIN — MUPIROCIN 1 G: 20 OINTMENT TOPICAL at 08:03

## 2018-03-09 RX ADMIN — CEFAZOLIN 2 G: 330 INJECTION, POWDER, FOR SOLUTION INTRAMUSCULAR; INTRAVENOUS at 07:03

## 2018-03-09 RX ADMIN — ACETAMINOPHEN 1000 MG: 10 INJECTION, SOLUTION INTRAVENOUS at 02:03

## 2018-03-09 RX ADMIN — FAMOTIDINE 20 MG: 20 TABLET, FILM COATED ORAL at 08:03

## 2018-03-09 RX ADMIN — SODIUM CHLORIDE, POTASSIUM CHLORIDE, SODIUM LACTATE AND CALCIUM CHLORIDE: 600; 310; 30; 20 INJECTION, SOLUTION INTRAVENOUS at 05:03

## 2018-03-09 RX ADMIN — DIGOXIN 0.12 MG: 0.12 TABLET ORAL at 01:03

## 2018-03-09 RX ADMIN — EPHEDRINE SULFATE 5 MG: 50 INJECTION INTRAMUSCULAR; INTRAVENOUS; SUBCUTANEOUS at 07:03

## 2018-03-09 RX ADMIN — ONDANSETRON 4 MG: 2 INJECTION INTRAMUSCULAR; INTRAVENOUS at 08:03

## 2018-03-09 RX ADMIN — ACETAMINOPHEN 1000 MG: 10 INJECTION, SOLUTION INTRAVENOUS at 09:03

## 2018-03-09 RX ADMIN — PHENYLEPHRINE HYDROCHLORIDE 100 MCG: 10 INJECTION INTRAVENOUS at 07:03

## 2018-03-09 RX ADMIN — PROPOFOL 75 MCG/KG/MIN: 10 INJECTION, EMULSION INTRAVENOUS at 07:03

## 2018-03-09 RX ADMIN — NIFEDIPINE 60 MG: 30 TABLET, FILM COATED, EXTENDED RELEASE ORAL at 08:03

## 2018-03-09 RX ADMIN — CEFAZOLIN SODIUM 2 G: 2 SOLUTION INTRAVENOUS at 11:03

## 2018-03-09 RX ADMIN — VANCOMYCIN HYDROCHLORIDE 1000 MG: 1 INJECTION, POWDER, LYOPHILIZED, FOR SOLUTION INTRAVENOUS at 05:03

## 2018-03-09 RX ADMIN — ROPIVACAINE HYDROCHLORIDE 3 ML: 5 INJECTION, SOLUTION EPIDURAL; INFILTRATION; PERINEURAL at 06:03

## 2018-03-09 RX ADMIN — ACETAMINOPHEN 1000 MG: 10 INJECTION, SOLUTION INTRAVENOUS at 07:03

## 2018-03-09 RX ADMIN — PHENYLEPHRINE HYDROCHLORIDE 100 MCG: 10 INJECTION INTRAVENOUS at 08:03

## 2018-03-09 RX ADMIN — ALLOPURINOL 100 MG: 100 TABLET ORAL at 01:03

## 2018-03-09 RX ADMIN — OXYCODONE HYDROCHLORIDE 5 MG: 5 TABLET ORAL at 05:03

## 2018-03-09 RX ADMIN — SODIUM CHLORIDE, SODIUM GLUCONATE, SODIUM ACETATE, POTASSIUM CHLORIDE, MAGNESIUM CHLORIDE, SODIUM PHOSPHATE, DIBASIC, AND POTASSIUM PHOSPHATE: .53; .5; .37; .037; .03; .012; .00082 INJECTION, SOLUTION INTRAVENOUS at 07:03

## 2018-03-09 RX ADMIN — ROSUVASTATIN CALCIUM 40 MG: 10 TABLET, FILM COATED ORAL at 01:03

## 2018-03-09 NOTE — PLAN OF CARE
SW met with pt at bedside to complete discharge assessment.  Pt has RW, BSC, WC, SC and hip kit.  Pt reported had [facility] in past and would like to use them again.  Pt will dc with HH.     03/09/18 [id number]   Discharge Assessment   Assessment Type Discharge Planning Assessment   Confirmed/corrected address and phone number on facesheet? Yes   Assessment information obtained from? Patient;Medical Record   Communicated expected length of stay with patient/caregiver no   Prior to hospitilization cognitive status: Alert/Oriented   Prior to hospitalization functional status: Assistive Equipment   Current Functional Status: Independent;Assistive Equipment;Needs Assistance   Lives With spouse   Able to Return to Prior Arrangements yes   Is patient able to care for self after discharge? Unable to determine at this time (comments)   Patient's perception of discharge disposition home health   Readmission Within The Last 30 Days no previous admission in last 30 days   Patient currently being followed by outpatient case management? No   Patient currently receives any other outside agency services? No   Equipment Currently Used at Home hip kit;bedside commode;walker, rolling;wheelchair;shower chair   Do you have any problems affording any of your prescribed medications? No   Is the patient taking medications as prescribed? yes   Does the patient have transportation home? Yes   Transportation Available family or friend will provide   Does the patient receive services at the [facility]? No   Discharge Plan A Home Health   Patient/Family In Agreement With Plan yes

## 2018-03-09 NOTE — CONSULTS
Consult Note  Nephrology    Consult Requested By: Nicholas Gonzalez MD  Reason for Consult: osteoarthritis, hyperlipidemia, HTN, CKD 3, A-fib with chronic anticoagulant use and CHF    SUBJECTIVE:     History of Present Illness:   77 y.o. male presents with a scheduled right knee repair.  Patient seen in PACU. Denies CP,SOB,F,C, N/V. Epic and bedside chart reviewed.    Past Medical History:   Diagnosis Date    A-fib     Cancer     skin    Carotid artery stenosis 8/28/2014 8/26/2014: Carotid duplex: DIANNA: mild. LICA: critical - 3.4 m/s.    CHF (congestive heart failure)     Chronic anticoagulation 8/28/2014    Heart failure, systolic and diastolic, chronic 11/17/2016 8/1/2016: Echo: Normal left ventricular size with mild systolic dysfunction. EF 45%. Markedly dilated LA. Moderate aortic valve sclerosis - 1.6 m/s.    Herniated disc     Hypercholesterolemia 11/17/2016 2004: Began statin.    Hypertension     Hypertension 11/17/2016 1990: Diagnosed.    Lipids abnormal     OA (osteoarthritis)     Osteopenia     Peripheral artery disease 12/1/2016    10/25/2016: Arterial Duplex: Right: SFA: Distal 1.8 m/s. Left: No obstructive disease above knee.    Stroke     2004     Past Surgical History:   Procedure Laterality Date    BLADDER TUMOR EXCISION      CAROTID ENDARTERECTOMY      Left/right    EYE SURGERY      bilateral kory    HERNIA REPAIR      knee scope Right     SKIN BIOPSY      ca    TONSILLECTOMY      tumors removed chest Bilateral      History reviewed. No pertinent family history.  Social History   Substance Use Topics    Smoking status: Never Smoker    Smokeless tobacco: Never Used    Alcohol use Yes      Comment: couple times yearly        Review of patient's allergies indicates:  No Known Allergies     Review of Systems:  Constitutional: No fever or chills  Respiratory: No cough or shortness of breath  Cardiovascular: No chest pain or palpitations  Gastrointestinal: No nausea or  vomiting  Neurological: No confusion or weakness    OBJECTIVE:     Vital Signs (Most Recent)  Temp: 97.5 °F (36.4 °C) (03/09/18 0832)  Pulse: 71 (03/09/18 1150)  Resp: 16 (03/09/18 1150)  BP: 117/71 (03/09/18 1150)  SpO2: 96 % (03/09/18 1150)    Vital Signs Range (Last 24H):  Temp:  [97.5 °F (36.4 °C)-98.2 °F (36.8 °C)]   Pulse:  [62-92]   Resp:  [16]   BP: (113-152)/(58-88)   SpO2:  [94 %-100 %]       Intake/Output Summary (Last 24 hours) at 03/09/18 1208  Last data filed at 03/09/18 0817   Gross per 24 hour   Intake             1400 ml   Output                0 ml   Net             1400 ml       Physical Exam:  General appearance: Well developed, well nourished  Eyes:  Conjunctivae/corneas clear. PERRL.  Lungs: Normal respiratory effort,   clear to auscultation bilaterally   Heart:irregularly irregular rate and rhythm, S1, S2 normal, + murmur, - rub or virgilio.  Abdomen: Soft, non-tender non-distended; bowel sounds normal; no masses,  no organomegaly  Extremities: No cyanosis or clubbing. No edema.  +1 pulses in BLE. ACE to right knee CDI, hemovac in place  Skin: Skin color, texture, turgor normal. No rashes or lesions  Neurologic: Normal strength and tone. No focal numbness or weakness   Bower      Laboratory:    Reviewed    Diagnostic Results:      ASSESSMENT/PLAN:     1. Right knee repair (M17.11): per therapy and ortho teams  2. Hyperlipidemia (E78.5): continue statin  3. HTN (I12.9): continue home meds with strict hold parameters. Clonidine dosing changed to PRN while inpatient, no diuretics for now.  4. Atrial fibrillation with chronic anticoagulation (I48.91, Z79.01): will place on tele, continue meds. Followed by Dr. Pressley.  5. CHF (I50.9): per Dr. Pressley, monitor I/O.  6. DVT prophy: apixaban 2.5 mg BID, CARMELO and SCD per ortho      Plan: Thanks for consult,  See above recommendations and orders. Will follow along.

## 2018-03-09 NOTE — OR NURSING
Giovana James np from Dr Luna's office here to see pt.  Dr Pressley consulted and spoke with Staci,in office.

## 2018-03-09 NOTE — PLAN OF CARE
03/09/18 1432   Final Note   Assessment Type Final Discharge Note   Discharge Disposition Home-Health   What phone number can be called within the next 1-3 days to see how you are doing after discharge? [phone]   Discharge plans and expectations educations in teach back method with documentation complete? Yes   Right Care Referral Info   Post Acute Recommendation Home-care   Facility Name [facility]

## 2018-03-09 NOTE — ANESTHESIA PROCEDURE NOTES
Spinal    Diagnosis: OA R KNEE  Patient location during procedure: holding area  Start time: 3/9/2018 6:45 AM  Timeout: 3/9/2018 6:45 AM  End time: 3/9/2018 6:49 AM  Staffing  Anesthesiologist: RASHAD GREEN  Performed: anesthesiologist   Preanesthetic Checklist  Completed: patient identified, site marked, surgical consent, pre-op evaluation, timeout performed, IV checked, risks and benefits discussed and monitors and equipment checked  Spinal Block  Patient position: sitting  Prep: ChloraPrep  Patient monitoring: heart rate, cardiac monitor and continuous pulse ox  Approach: right paramedian  Location: L4-5  Injection technique: single shot  CSF Fluid: clear free-flowing CSF  Needle  Needle type: pencil-tip   Needle gauge: 24 G  Needle length: 4 in  Additional Documentation: incremental injection, negative aspiration for heme and no paresthesia on injection  Needle localization: anatomical landmarks  Assessment  Sensory level: T5   Dermatomal levels determined by alcohol wipe and pinch or prick  Ease of block: easy  Patient's tolerance of the procedure: comfortable throughout block and no complaints  Medications:  Bolus administered: 3 mL of 0.5 ropivacaine  Epinephrine added: none

## 2018-03-09 NOTE — DISCHARGE SUMMARY
PHYSICAL THERAPY DISCHARGE SUMMARY     Name: Alan Gutiérrez  [facility] Number: [id number]    Diagnosis: R knee pain  Physician: Nicholas Gonzalez MD  Treatment Orders: PT Eval and Treat  Past Medical History:   Diagnosis Date    A-fib     Cancer     skin    Carotid artery stenosis 8/28/2014 8/26/2014: Carotid duplex: DIANNA: mild. LICA: critical - 3.4 m/s.    CHF (congestive heart failure)     Chronic anticoagulation 8/28/2014    Heart failure, systolic and diastolic, chronic 11/17/2016 8/1/2016: Echo: Normal left ventricular size with mild systolic dysfunction. EF 45%. Markedly dilated LA. Moderate aortic valve sclerosis - 1.6 m/s.    Herniated disc     Hypercholesterolemia 11/17/2016 2004: Began statin.    Hypertension     Hypertension 11/17/2016 1990: Diagnosed.    Lipids abnormal     OA (osteoarthritis)     Osteopenia     Peripheral artery disease 12/1/2016    10/25/2016: Arterial Duplex: Right: SFA: Distal 1.8 m/s. Left: No obstructive disease above knee.    Stroke     2004       Initial visit: 2/26/18  Date of Last visit: 3/8/18  Date of Discharge Note: 03/09/2018  Total Visits Received: 6    ASSESSMENT   Status Towards Goals Met:    GOALS:   Short Term Goals: 2 weeks  1. Report decreased R knee pain  <  / =  5/10 at worst to increase tolerance for walking.- met 3/5/18  2. Pt will be able to tolerate multi-directional LE strengthening in order to improve ability to perform household chores.- met 3/5/18  3. Pt will report 50% improvement in ability to walk long distances since start of care to indicate improved functional mobility.- not met   4. Pt will ambulate 200' demonstrating only mild gait deviations to improve gait pattern prior to TKA.- not met   5. Pt to tolerate HEP to improve ROM and independence with ADL's- met 3/5/18    Goals Not achieved and why: Goals not met secondary to pt having surgery.     Discharge reason : Hospital admission for TKA.     G-CODE: Discharge g-code not  filed due to discharge note being documentation only.   CMS Impairment/Limitation/Restriction for FOTO Knee Survey  Status Limitation G-Code CMS Severity Modifier  Intake 30% 70% Current Status CL - At least 60 percent but less than 80 percent  Predicted 49% 51% Goal Status+ CK - At least 40 percent but less than 60 percent  +Based on FOTO predicted change score    PLAN   This patient is discharged from Physical Therapy Services.

## 2018-03-09 NOTE — DISCHARGE INSTRUCTIONS
Knee Athroscopy Discharge Instructions    1) Pain: After surgery your knee will be sore. The knee will likely have been injected with a numbing medicine (Exparel) prior to completion of surgery for pain control. This is indicated on a green bracelet that you will continue to wear for 4 days after surgery. You will   also get a prescription for pain control before you leave the hospital. Ice and elevation will assist with pain control.    a) Apply ice as much as possible for the first 72 hours. After 72 hours, apply ice for 20minutes after therapy,after exercising or whenever experiencing pain. Avoid direct skin contact with ice to prevent frostbit.          b) Elevate the affected leg with the pillow the length of the leg higher than your heart to assist with swelling and pain.  2) Incision Care:  a) Some drainage from the incision in the first 72 hours is normal. If drainage is excessive,remove bandage,  pat dry, cover with sterile gauze and secure with tape. Notify physician about excessive drainage. Staples will be removed 14 days after surgery   3) Activity:  a) Perform exercises 2-3 x day.  b) You may shower 48 hours after surgery providing the dressing is waterproof. No tub or hot tub usage.DR BUCHANAN PATIENTS CANNOT SHOWER UNTIL STAPLES ARE REMOVED. Support help is mandatory during showering. If the dressing becomes wet, replace with a new dressing.   c) Wear thigh high aure hose stockings for 3 weeks after surgery .You may remove stockings for 1- 2 hours during the day only.If your physician orders the CPM machine you are to use it for 2 hours in the am and 2 hours in the pm.Increase the flexion 5 degrees each session if tolerated. This is not to replace your exercise program.  4) For lifetime after your replacement surgery, you may need antibiotic coverage before dental or minor surgical procedures.  5) Possible Complications: Call Surgeon  a) Infection: Report these signs and symptoms to your  surgeon.  i) Unexpected redness around incision   ii) Persistent drainage from wound after 72 hours.  iii) Temperature ,can be treated with Tylenol. Do not go to the emergency room or urgent care center, call your surgeon.   iv) Additional swelling  v) Pain not controlled with current pain medication  b) Blood Clot: Report theses signs and symptoms to your surgeon  i) Unusual pain  ii) Red or discolored skin  iii) Swelling in the leg  iv) Unusual warm skin

## 2018-03-09 NOTE — OP NOTE
[facility]  Operative Report    SUMMARY     Surgery Date: 3/9/2018     Surgeon(s) and Role:     * Nicholas Gonzalez MD - Primary    Assistant: MANUELA Marie FA    Pre-op Diagnosis:  Primary localized osteoarthritis of right knee [M17.11]    Post-op Diagnosis:  Post-Op Diagnosis Codes:     * Primary localized osteoarthritis of right knee [M17.11]    PROCEDURE:  Right Total Knee Replacement ([facility])    Anesthesia: Spinal    Description of Procedure: The appropriate consent was signed. The patient understood and except all risks and complications. The patient was brought to the Operating Room after undergoing spinal anesthetic. Tourniquet was applied to the proximal operative leg. The lower extremity was then prepped and draped in a sterile manner. After exsanguination of Esmarch bandage, tourniquet was inflated to 300 mmHg. An anterior incision with a medial parapatellar arthrotomy was performed. The menisci, anterior cruciate ligament and patellar fat pad were excised. The patella was resurfaced and sized to a 38 mm patella.   Three holes were then drilled for the lugs and this was trialed. A hole was then  drilled in the distal femur, stacia was placed down the femoral canal and the   distal femur cut in 6 degrees of valgus. The tibia was then cut utilizing the   [employer] navigation system in 0 degree varus valgus with 5 degrees in posterior   slope. Extension block was placed and full extension was noted. The femur was   then sized to a #5 and #5 cutting block was placed and the anterior and   posterior cuts as well as chamfer cuts were performed. The tibia was sized to a  size 5 and a trial was performed.Trials were performed and a 9 mm spacer was felt to be appropriate.The tibia was then prepared with the drill and the punch, and trials were   removed and the knee washed out. Aquamantys was used to paint the posterior   capsule and corners. Palacos cement with gentamicin was then mixed and    pressurized sequentially in tibia, femur and patella. Components were impacted   and excess cement was removed. A trial 9 mm spacer was placed and knee   brought out to full extension. Once the cement was allowed to harden, the 9 mm  spacer was felt to be appropriate and this was locked into the tibial   baseplate. Tourniquet was deflated and hemostasis was obtained. Good patellar   tracking was noted. Exparel cocktail was injected into the fascia and   subcutaneous tissue. The fascial closure was obtained with a running #2 Quill suture over a drain. Subcutaneous closure was obtained with #1 and 2-0 Vicryl. Skin was then closed with the staples and a sterile compressive dressing was applied. The patient was then brought to the Recovery Room in a good condition.        Estimated Blood Loss: 200cc         Specimens:   Specimen (12h ago through future)    None

## 2018-03-09 NOTE — PT/OT/SLP PROGRESS
Physical Therapy Treatment    Patient Name:  Alan Gutiérrez   MRN:  [id number]    Recommendations:     Discharge Recommendations:  home, home health PT   Discharge Equipment Recommendations: none   Barriers to discharge: None    Assessment:     Alan Gutiérrez is a 77 y.o. male admitted with a medical diagnosis of Primary localized osteoarthrosis of the knee, right.  He presents with the following impairments/functional limitations:  weakness, impaired endurance, decreased lower extremity function, gait instability, impaired balance, pain. Excellent progress in therapy.  Pt amb 150' with RW supervision and 3/10 pain.    Rehab Prognosis:  excellent; patient would benefit from acute skilled PT services to address these deficits and reach maximum level of function.      Recent Surgery: Procedure(s) (LRB):  REPLACEMENT-KNEE WITH NAVIGATION (Right) Day of Surgery    Plan:     During this hospitalization, patient to be seen BID to address the above listed problems via gait training, therapeutic activities, therapeutic exercises  · Plan of Care Expires:  04/09/18   Plan of Care Reviewed with: patient, spouse    Subjective     Communicated with patient prior to session.  Patient found in BS chair upon PT entry to room, agreeable to treatment.      Chief Complaint: R LE uncomfortable in chair  Patient comments/goals: feels good about how everything is going  Pain/Comfort:  · Pain Rating 1: 2/10  · Location - Side 1: Right  · Location 1: knee  · Pain Addressed 1: Pre-medicate for activity, Distraction  · Pain Rating Post-Intervention 1: 3/10    Patients cultural, spiritual, Jehovah's witness conflicts given the current situation: none mentioned    Objective:     Patient found with: peripheral IV, ricketts catheter, hemovac     General Precautions: Standard, fall   Orthopedic Precautions:Full weight bearing   Braces: N/A     Functional Mobility:  · Bed Mobility:     · Sit to Supine: supervision  · Transfers:     · Sit to Stand:   contact guard assistance with rolling walker  · Gait: amb 150' with RW and supervision  · Balance: good standing dynamic balance      AM-PAC 6 CLICK MOBILITY  Turning over in bed (including adjusting bedclothes, sheets and blankets)?: 3  Sitting down on and standing up from a chair with arms (e.g., wheelchair, bedside commode, etc.): 3  Moving from lying on back to sitting on the side of the bed?: 3  Moving to and from a bed to a chair (including a wheelchair)?: 3  Need to walk in hospital room?: 3  Climbing 3-5 steps with a railing?: 3  Total Score: 18       Therapeutic Activities and Exercises:   In bed supine: AP x20; slr with assist x 15; QS x10;      Patient left with bed in chair position with all lines intact, call button in reach, bed alarm on, nurse notified and wife present..    GOALS:    Physical Therapy Goals        Problem: Physical Therapy Goal    Goal Priority Disciplines Outcome Goal Variances Interventions   Physical Therapy Goal     PT/OT, PT      Description:  Goals to be met by 3-12-18.  1. Sup<>sit mod I  2. Sit<>stand with RW mod I  3. amb 150' with RW mod I                      Time Tracking:     PT Received On: 03/09/18  PT Start Time: 1625     PT Stop Time: 1650  PT Total Time (min): 25 min     Billable Minutes: Gait Training 15 and Therapeutic Exercise 10    Treatment Type: Treatment  PT/PTA: PT           Yifan Guerra, PT  03/09/2018

## 2018-03-09 NOTE — PT/OT/SLP EVAL
Physical Therapy Evaluation    Patient Name:  Alan Gutiérrez   MRN:  [id number]    Recommendations:     Discharge Recommendations:  home, home health PT   Discharge Equipment Recommendations: none   Barriers to discharge: None    Assessment:     Alan Gutiérrez is a 77 y.o. male admitted with a medical diagnosis of Primary localized osteoarthrosis of the knee, right.   Pre-op Diagnosis:  Primary localized osteoarthritis of right knee [M17.11]     Post-op Diagnosis:  Post-Op Diagnosis Codes:     * Primary localized osteoarthritis of right knee [M17.11]     PROCEDURE:  Right Total Knee Replacement ([facility])     He presents with the following impairments/functional limitations:  impaired endurance, weakness, gait instability, impaired balance, decreased lower extremity function, pain, impaired functional mobilty. Pt has been amb with RW mod I PTA and a high level of pain.  Pt will benefit from hospital PT and  PT to regain full independent functional mobility.    Rehab Prognosis:  excellent; patient would benefit from acute skilled PT services to address these deficits and reach maximum level of function.      Recent Surgery: Procedure(s) (LRB):  REPLACEMENT-KNEE WITH NAVIGATION (Right) Day of Surgery    Plan:     During this hospitalization, patient to be seen BID to address the above listed problems via gait training, therapeutic activities, therapeutic exercises  · Plan of Care Expires:  04/09/18   Plan of Care Reviewed with: patient, spouse    Subjective     Communicated with patient and spouse prior to session.  Patient found supine in bed upon PT entry to room, agreeable to evaluation.      Chief Complaint: no   Patient comments/goals: looking forward to recovery so they can visit all the [place] .  Pain/Comfort:  · Pain Rating 1: 2/10  · Location - Side 1: Right  · Location 1: knee  · Pain Addressed 1: Pre-medicate for activity, Distraction  · Pain Rating Post-Intervention 1:  "3/10    Patients cultural, spiritual, Tenriism conflicts given the current situation: none mentioned    Living Environment:  Per pt and spouse: Pt lives with spouse in 2 story house with 4" step to enter. He has a bedroom and bathroom with shower stall and shower chair on wheels as well as standard height toilet with grab bar on the first floor and does not need to go to second story.   Prior to admission, patients level of function was modified independent earlier this year up until "the holidays" when pt's knee became more painful. Recently he has still been ambulating with a rolling walker, sits to shower on recently purchased shower chair, and uses the garb bar for toilet transfers. Since the holidays he has been unable to drive or grocery shop. His spouse purchased a transport w/c that they used to come to the hospital.       Patient has the following equipment: hip kit, bedside commode, walker, rolling, wheelchair, shower chair.  DME owned (not currently used): none.  Upon discharge, patient will have assistance from spouse.    Objective:     Patient found with: peripheral IV, ricketts catheter, hemovac     General Precautions: Standard, fall   Orthopedic Precautions:Full weight bearing   Braces: N/A     Exams:  · Cognitive Exam:  Patient is oriented to Person, Place, Time and Situation and follows 100% of verbal commands   · Fine Motor Coordination:    · -       Impaired  RLE heel shin -  · Gross Motor Coordination:  WFL  · Postural Exam:  Patient presented with the following abnormalities:    · -       Rounded shoulders  · -       Forward head  · -       Kyphosis  · Sensation:    · -       Intact  · Skin Integrity/Edema:      · -       Skin integrity: Visible skin intact  · -       Edema: Mild R LE  · RLE ROM: WFL except limited 2/2 pain  · RLE Strength: WFL except limited 2/2 pain  · LLE ROM: WFL  · LLE Strength: WFL    Functional Mobility:  · Bed Mobility:     · Supine to Sit: minimum " assistance  · Transfers:     · Sit to Stand:  minimum assistance with rolling walker  · Gait: 60' with RW min A  · Balance: fair standing dynamic balance    AM-PAC 6 CLICK MOBILITY  Total Score:18       Patient left up in chair with all lines intact, call button in reach, nurse notified and wife present.    GOALS:    Physical Therapy Goals        Problem: Physical Therapy Goal    Goal Priority Disciplines Outcome Goal Variances Interventions   Physical Therapy Goal     PT/OT, PT      Description:  Goals to be met by 3-12-18.  1. Sup<>sit mod I  2. Sit<>stand with RW mod I  3. amb 150' with RW mod I                      History:     Past Medical History:   Diagnosis Date    A-fib     Cancer     skin    Carotid artery stenosis 8/28/2014 8/26/2014: Carotid duplex: DIANNA: mild. LICA: critical - 3.4 m/s.    CHF (congestive heart failure)     Chronic anticoagulation 8/28/2014    Heart failure, systolic and diastolic, chronic 11/17/2016 8/1/2016: Echo: Normal left ventricular size with mild systolic dysfunction. EF 45%. Markedly dilated LA. Moderate aortic valve sclerosis - 1.6 m/s.    Herniated disc     Hypercholesterolemia 11/17/2016 2004: Began statin.    Hypertension     Hypertension 11/17/2016 1990: Diagnosed.    Lipids abnormal     OA (osteoarthritis)     Osteopenia     Peripheral artery disease 12/1/2016    10/25/2016: Arterial Duplex: Right: SFA: Distal 1.8 m/s. Left: No obstructive disease above knee.    Stroke     2004       Past Surgical History:   Procedure Laterality Date    BLADDER TUMOR EXCISION      CAROTID ENDARTERECTOMY      Left/right    EYE SURGERY      bilateral kory    HERNIA REPAIR      knee scope Right     SKIN BIOPSY      ca    TONSILLECTOMY      tumors removed chest Bilateral        Clinical Decision Making:     History  Co-morbidities and personal factors that may impact the plan of care Examination  Body Structures and Functions, activity limitations and  participation restrictions that may impact the plan of care Clinical Presentation   Decision Making/ Complexity Score   Co-morbidities:   [] Time since onset of injury / illness / exacerbation  [] Status of current condition  []Patient's cognitive status and safety concerns    [] Multiple Medical Problems (see med hx)  Personal Factors:   [] Patient's age  [] Prior Level of function   [] Patient's home situation (environment and family support)  [] Patient's level of motivation  [] Expected progression of patient      HISTORY:(criteria)    [] 39918 - no personal factors/history    [] 79738 - has 1-2 personal factor/comorbidity     [] 82932 - has >3 personal factor/comorbidity     Body Regions:  [] Objective examination findings  [] Head     []  Neck  [] Trunk   [] Upper Extremity  [] Lower Extremity    Body Systems:  [] For communication ability, affect, cognition, language, and learning style: the assessment of the ability to make needs known, consciousness, orientation (person, place, and time), expected emotional /behavioral responses, and learning preferences (eg, learning barriers, education  needs)  [] For the neuromuscular system: a general assessment of gross coordinated movement (eg, balance, gait, locomotion, transfers, and transitions) and motor function  (motor control and motor learning)  [] For the musculoskeletal system: the assessment of gross symmetry, gross range of motion, gross strength, height, and weight  [] For the integumentary system: the assessment of pliability(texture), presence of scar formation, skin color, and skin integrity  [] For cardiovascular/pulmonary system: the assessment of heart rate, respiratory rate, blood pressure, and edema     Activity limitations:    [] Patient's cognitive status and saf ety concerns          [] Status of current condition      [] Weight bearing restriction  [] Cardiopulmunary Restriction    Participation Restrictions:   [] Goals and goal agreement with  the patient     [] Rehab potential (prognosis) and probable outcome      Examination of Body System: (criteria)    [] 48714 - addressing 1-2 elements    [] 24105 - addressing a total of 3 or more elements     [] 34467 -  Addressing a total of 4 or more elements         Clinical Presentation: (criteria)  Choose one     On examination of body system using standardized tests and measures patient presents with (CHOOSE ONE) elements from any of the following: body structures and functions, activity limitations, and/or participation restrictions.  Leading to a clinical presentation that is considered (CHOOSE ONE)                              Clinical Decision Making  (Eval Complexity):  Choose One     Time Tracking:     PT Received On: 03/09/18  PT Start Time: 1340     PT Stop Time: 1425  PT Total Time (min): 45 min     Billable Minutes: Evaluation 30 and Gait Training 15      Yifan Guerra, JOSEPH  03/09/2018

## 2018-03-09 NOTE — PLAN OF CARE
Problem: Physical Therapy Goal  Goal: Physical Therapy Goal  Goals to be met by 3-12-18.  1. Sup<>sit mod I  2. Sit<>stand with RW mod I  3. amb 150' with RW mod I     -    Comments: Excellent progress in therapy.  Pt amb 150' with RW supervision and 3/10 pain.

## 2018-03-09 NOTE — PT/OT/SLP PROGRESS
Occupational Therapy      Patient Name:  Alan Gutiérrez   MRN:  [id number]    Patient not seen today secondary to Other (Occupational Therapy  Not Seen    Alan Gutiérrez   MRN: [id number]     Patient not seen for Occupational Therapy today due to ( ) departmental protocol for elective surgery patients.    Patient with Primary localized osteoarthritis of right knee [M17.11], s/p Procedure(s):  REPLACEMENT-KNEE WITH NAVIGATION 3/9/2018 who will be seen for Occupational Therapy evaluation POD#1.    PRUDENCIO Devi   3/9/2018 ).

## 2018-03-09 NOTE — PLAN OF CARE
[facility]       [address]       [place] [address]       [phone]               [facility] Discharge Orders    Home [place]           Expected Discharge Date: 3/11    Diagnoses:  Post-op  knee replacement    Patient is homebound due to:   Pt requires home health services due to taxing effort to leave the home as a result of immobility from Post-op knee replacement      Weight Bearing Status:   full weight bearing: right leg    CPM: for 3 weeks (2 hours in the morning, 2 hours in the evening); increase by 5 degrees each day until maximum amount then continue to use at the maximum degrees.    TKR:  CPM use should total at least 4-6 hours daily. Start CPM setting around the PROM measurement at Eval.  Progress 5 degrees daily as tolerated until max setting is achieved.  Continue CPM use for 3 weeks post-op then CPM provider [facility] will contact you for CPM pickup.     Physical Therapy   3 times a week   - Ambulate with a rolling walker  - Progress to cane  - Instruct on ROM and strengthening of knee    Aide to provide assistance with personal care and  ADLs  3 times a week    Wound Care:   If patient is discharged with aqua eleonora/silver dressing, leave on for 5 days unless saturated border to border, then follow instructions below:  Cleanse with wound cleanser or normal saline and apply Mepore Pro dressing.  If Mepore pro not available apply gauze and tegaderm.  Change 3 times a week or PRN if dry.  Teach patient to change daily if draining.        Contact:  Please contact the nurse practitionerZohreh at [phone] at Ext [phone]. with concerns.  She is in surgery M,W,F so if urgent and needs to be addressed prior to the end of the day call the  and they with contact her in the OR or Clinic.         BLOOD THINNER:    If sent home on Xarelto         -14 days post-op for TKR       -30 days post-op for THR     If sent home home on ASA    325mg   BID x 4 weeks     Once  finished with prescribed blood thinner, patients can return to pre-surgical ASA dosage if they took ASA before surgery.     Home Health Nurse for Wound Checks and to remove staples on POD #  14  PT/SN to remove staples 14 days Post-op and apply skin prep and steri-strips.    On dressing change, apply new dressing while knee in flexed position.    Pt may shower if incision dressing has waterproof dressing in place. Removal and replacement of dressing after shower only needed if incision is suspected to have gotten wet during shower.  Otherwise change as previously described depending on dressing/drainage    No soaking in the tub or hot tub use. Cold therapy/Ice encouraged at least 20 minutes 2-3 times daily or more if desired.  Incision must be kept waterproof while icing.      FWB unless otherwise indicated.  Progress to cane as able.  Set up for outpatient PT as soon as able after staple removal once patient is MOD I with cane.    Outpatient Therapy: [facility]    [address] [address]   or  [address]  [place] [address]    Call [phone] to schedule appointment  Fax [phone]    If need orders: Call Tracey at Ext [phone]      Wear TEDS Bilateral Thigh High Stockings for 3 weeks  Pawel hose x 3 weeks. Ok to remove pawel hose 1-2 hours/day max if desired.       DME:  - rolling Walker  - 3 in 1 commode  - tub bench / shower chair  - Per PT/OT recommendation          Zohreh Coleman

## 2018-03-09 NOTE — TRANSFER OF CARE
"Anesthesia Transfer of Care Note    Patient: Alan Gutiérrez    Procedure(s) Performed: Procedure(s) (LRB):  REPLACEMENT-KNEE WITH NAVIGATION (Right)    Patient location: PACU    Anesthesia Type: spinal    Transport from OR: Transported from OR on room air with adequate spontaneous ventilation    Post pain: adequate analgesia    Post assessment: no apparent anesthetic complications    Post vital signs: stable    Level of consciousness: awake    Nausea/Vomiting: no nausea/vomiting    Complications: none    Transfer of care protocol was followed      Last vitals:   Visit Vitals  BP (!) 152/88 (BP Location: Left arm, Patient Position: Lying)   Pulse 81   Temp 36.8 °C (98.2 °F) (Oral)   Resp 16   Ht 5' 8" (1.727 m)   Wt 74.8 kg (165 lb)   SpO2 97%   BMI 25.09 kg/m²     "

## 2018-03-09 NOTE — INTERVAL H&P NOTE
The patient has been examined and the H&P has been reviewed:    I concur with the findings and no changes have occurred since H&P was written.    Anesthesia/Surgery risks, benefits and alternative options discussed and understood by patient/family.          Active Hospital Problems    Diagnosis  POA    Primary localized osteoarthrosis of the knee, right [M17.11]  Yes      Resolved Hospital Problems    Diagnosis Date Resolved POA   No resolved problems to display.

## 2018-03-09 NOTE — PLAN OF CARE
Problem: Physical Therapy Goal  Goal: Physical Therapy Goal  Goals to be met by 3-12-18.  1. Sup<>sit mod I  2. Sit<>stand with RW mod I  3. amb 150' with RW mod I    -    Comments: Physical therapy eval completed.  Recommend home with  PT.  No equipment needed.

## 2018-03-10 LAB
ERYTHROCYTE [DISTWIDTH] IN BLOOD BY AUTOMATED COUNT: 14.4 %
HCT VFR BLD AUTO: 38.4 %
HGB BLD-MCNC: 12.5 G/DL
MCH RBC QN AUTO: 29 PG
MCHC RBC AUTO-ENTMCNC: 32.6 G/DL
MCV RBC AUTO: 89 FL
PLATELET # BLD AUTO: 203 K/UL
PMV BLD AUTO: 9.4 FL
RBC # BLD AUTO: 4.31 M/UL
WBC # BLD AUTO: 8.63 K/UL

## 2018-03-10 PROCEDURE — 25000003 PHARM REV CODE 250: Performed by: NURSE PRACTITIONER

## 2018-03-10 PROCEDURE — 94799 UNLISTED PULMONARY SVC/PX: CPT

## 2018-03-10 PROCEDURE — 85027 COMPLETE CBC AUTOMATED: CPT

## 2018-03-10 PROCEDURE — 63600175 PHARM REV CODE 636 W HCPCS

## 2018-03-10 PROCEDURE — 25000003 PHARM REV CODE 250

## 2018-03-10 PROCEDURE — 97530 THERAPEUTIC ACTIVITIES: CPT

## 2018-03-10 PROCEDURE — 97165 OT EVAL LOW COMPLEX 30 MIN: CPT

## 2018-03-10 PROCEDURE — 97116 GAIT TRAINING THERAPY: CPT

## 2018-03-10 PROCEDURE — 97535 SELF CARE MNGMENT TRAINING: CPT

## 2018-03-10 PROCEDURE — 97110 THERAPEUTIC EXERCISES: CPT

## 2018-03-10 PROCEDURE — 11000001 HC ACUTE MED/SURG PRIVATE ROOM

## 2018-03-10 PROCEDURE — 36415 COLL VENOUS BLD VENIPUNCTURE: CPT

## 2018-03-10 PROCEDURE — 94761 N-INVAS EAR/PLS OXIMETRY MLT: CPT

## 2018-03-10 RX ADMIN — DOCUSATE SODIUM 100 MG: 100 CAPSULE, LIQUID FILLED ORAL at 08:03

## 2018-03-10 RX ADMIN — NIFEDIPINE 60 MG: 30 TABLET, FILM COATED, EXTENDED RELEASE ORAL at 08:03

## 2018-03-10 RX ADMIN — MUPIROCIN 1 G: 20 OINTMENT TOPICAL at 08:03

## 2018-03-10 RX ADMIN — ACETAMINOPHEN 1000 MG: 10 INJECTION, SOLUTION INTRAVENOUS at 05:03

## 2018-03-10 RX ADMIN — OXYCODONE HYDROCHLORIDE 10 MG: 5 TABLET ORAL at 06:03

## 2018-03-10 RX ADMIN — ROSUVASTATIN CALCIUM 40 MG: 10 TABLET, FILM COATED ORAL at 08:03

## 2018-03-10 RX ADMIN — CLONIDINE HYDROCHLORIDE 0.1 MG: 0.1 TABLET ORAL at 12:03

## 2018-03-10 RX ADMIN — OXYCODONE HYDROCHLORIDE 10 MG: 5 TABLET ORAL at 02:03

## 2018-03-10 RX ADMIN — APIXABAN 2.5 MG: 2.5 TABLET, FILM COATED ORAL at 08:03

## 2018-03-10 RX ADMIN — ALLOPURINOL 100 MG: 100 TABLET ORAL at 08:03

## 2018-03-10 RX ADMIN — NIFEDIPINE 30 MG: 30 TABLET, FILM COATED, EXTENDED RELEASE ORAL at 06:03

## 2018-03-10 RX ADMIN — POLYETHYLENE GLYCOL 3350 17 G: 17 POWDER, FOR SOLUTION ORAL at 08:03

## 2018-03-10 RX ADMIN — DIGOXIN 0.12 MG: 0.12 TABLET ORAL at 08:03

## 2018-03-10 RX ADMIN — HYDROMORPHONE HYDROCHLORIDE 0.5 MG: 1 INJECTION, SOLUTION INTRAMUSCULAR; INTRAVENOUS; SUBCUTANEOUS at 12:03

## 2018-03-10 RX ADMIN — METOPROLOL SUCCINATE 50 MG: 50 TABLET, EXTENDED RELEASE ORAL at 12:03

## 2018-03-10 RX ADMIN — OXYCODONE HYDROCHLORIDE 5 MG: 5 TABLET ORAL at 10:03

## 2018-03-10 RX ADMIN — FAMOTIDINE 20 MG: 20 TABLET, FILM COATED ORAL at 08:03

## 2018-03-10 NOTE — PLAN OF CARE
Problem: Occupational Therapy Goal  Goal: Occupational Therapy Goal  Goals to be met by 4/10/18  1. Min A UBD  2. Min A LBD; assess need for AE  3. Min G/H standing at sink with RW  4. Min A VC to incorporate LE exercise into self-care tasks  5. Discuss home safety and set-up for self-care    Outcome: Ongoing (interventions implemented as appropriate)  OT evaluation completed with treatment initiated.  Recommend Home Health PT/OT at discharge.  DME: none.  PRUDENCIO Devi 3/10/2018

## 2018-03-10 NOTE — NURSING
No acute events this shift. CPM machine and polar care were in place this morning after therapy. Pt pain has been controlled with PO and IV medications. BP medications were held for morning rounds but administered PRN medications after noon for climbing Bp. Telemetry maintained-pt still running AFIB. Ace wrap in place, hemovac was pulled. Pt voiding freely throughout the shift.   Safety measures ongoing. Will continue to monitor.

## 2018-03-10 NOTE — PT/OT/SLP EVAL
Occupational Therapy   Evaluation/Treatment  Name: Alan Gutiérrez  MRN: [id number]  Admitting Diagnosis:  Primary localized osteoarthrosis of the knee, right 1 Day Post-Op, s/p Right TKA    Recommendations:     Discharge Recommendations: home health PT, home health OT  Discharge Equipment Recommendations:  none  Barriers to discharge:  Inaccessible home environment    History:     Occupational Profile:  Living Environment: lives with his wife in a single story house with 4 GERARDO at entry  Previous level of function: ambulatory with RW, able to drive a car but his wife has been driving  In recent months, MI self-care (assist don pants and socks PRN)  Roles and Routines: enjoys eating in restaurants and going to movies  Equipment Owned:  hip kit, walker, rolling, shower chair, grab bar, bedside commode (transport W/C; DME available for use)  Assistance upon Discharge: the patient's wife will be assisting with his care at discharge    Past Medical History:   Diagnosis Date    A-fib     Cancer     skin    Carotid artery stenosis 8/28/2014 8/26/2014: Carotid duplex: DIANNA: mild. LICA: critical - 3.4 m/s.    CHF (congestive heart failure)     Chronic anticoagulation 8/28/2014    Heart failure, systolic and diastolic, chronic 11/17/2016 8/1/2016: Echo: Normal left ventricular size with mild systolic dysfunction. EF 45%. Markedly dilated LA. Moderate aortic valve sclerosis - 1.6 m/s.    Herniated disc     Hypercholesterolemia 11/17/2016 2004: Began statin.    Hypertension     Hypertension 11/17/2016 1990: Diagnosed.    Lipids abnormal     OA (osteoarthritis)     Osteopenia     Peripheral artery disease 12/1/2016    10/25/2016: Arterial Duplex: Right: SFA: Distal 1.8 m/s. Left: No obstructive disease above knee.    Stroke     2004       Past Surgical History:   Procedure Laterality Date    BLADDER TUMOR EXCISION      CAROTID ENDARTERECTOMY      Left/right    EYE SURGERY      bilateral kory     HERNIA REPAIR      knee scope Right     SKIN BIOPSY      ca    TONSILLECTOMY      tumors removed chest Bilateral        Subjective     Chief Complaint: knee pain from CPM use this morning  Patient/Family stated goals: regain the ability to walk  Communicated with: patient and his wife prior to session.  The patient was seen on the second attempt.  He was agreeable with OT service  Pain/Comfort:  · Pain Rating 1: 5/10  · Location - Side 1: Right  · Location - Orientation 1: generalized  · Location 1: knee  · Pain Addressed 1: Pre-medicate for activity, Reposition, Cessation of Activity  · Pain Rating Post-Intervention 1: 3/10    Patients cultural, spiritual, Rastafarian conflicts given the current situation: none    Objective:     Patient found with: peripheral IV, telemetry, SCD    General Precautions: Standard, fall   Orthopedic Precautions:Full weight bearing   Braces: N/A     Occupational Performance:    Bed Mobility:    · Min A supine>sit EOB (very slow with difficulty scooting to EOB)    Functional Mobility/Transfers:       Min A sit><stand with RW         Activities of Daily Living:  · MI self-feeding bed level  · Set-up Assist G/H (brush teeth, wash face and hands) seated in the chair  · Mod A UBD (don hospital gown as robe) seated EOB  · Mod A LBD (don briefs, doff/don socks) at EOB with RW used for standing (Min A standing balance,pt having difficulty coordinating use of RW for balance and manipulating briefs simultaneously)    Cognitive/Visual Perceptual:  Cognitive/Psychosocial Skills:     -       Oriented to: Person, Place, Time and Situation   -       Follows Commands/attention:Follows one-step commands  -       Communication: clear/fluent  -       Memory: No Deficits noted  -       Safety awareness/insight to disability: impaired   -       Mood/Affect/Coping skills/emotional control: Appropriate to situation, Cooperative and Lethargic  Visual/Perceptual:      -Intact  -reading glasses    Physical  "Exam:  Postural examination/scapula alignment:    -       Rounded shoulders  -       Forward head  -       Kyphosis  Skin integrity: Visible skin intact  Edema:  None noted  Sensation:    -       Intact for light touch both hands  Motor Planning:    -       fair  Dominant hand:    -       Right  UE ROM WFL BUE  UE Strength: 4-/5 BUE  Hand Function: WFL both hands  Gross motor coordination:   Fair + sitting balance EOB, impaired LE balance-gait with low pain tolerance and endurance (groggy due to pain medication)    Patient left up in chair with all lines intact, call button in reach and patient's wife present    AM PAC 6 Click:  AM PAC Total Score: 16    Treatment & Education:  Pain management post-TKA surgery, integrating LE exercise into ADLs, bed mobility, LBD, transfer training  Education:    Assessment:     Alan Gutiérrez is a 77 y.o. male with a medical diagnosis of Primary localized osteoarthrosis of the knee, right., s/p Right TKA  He presents with Performance deficits affecting function are weakness, impaired endurance, impaired functional mobilty, gait instability, impaired self care skills, decreased upper extremity function, decreased lower extremity function, decreased safety awareness, pain, decreased ROM, orthopedic precautions.effected performance during the session.  OT treatment is needed to maximize functional mobility and self-care skill in a manner that facilitates surgery outcome.      Rehab Prognosis:  good; patient would benefit from acute skilled OT services to address these deficits and reach maximum level of function.         Clinical Decision Makin.  OT Low:  "Pt evaluation falls under low complexity for evaluation coding due to performance deficits noted in 1-3 areas as stated above and 0 co-morbities affecting current functional status. Data obtained from problem focused assessments. No modifications or assistance was required for completion of evaluation. Only brief " "occupational profile and history review completed."     Plan:     Patient to be seen daily to address the above listed problems via self-care/home management, therapeutic activities, therapeutic exercises  · Plan of Care Expires: 04/10/18  · Plan of Care Reviewed with: patient, spouse    This Plan of care has been discussed with the patient who was involved in its development and understands and is in agreement with the identified goals and treatment plan    GOALS:    Occupational Therapy Goals        Problem: Occupational Therapy Goal    Goal Priority Disciplines Outcome Interventions   Occupational Therapy Goal     OT, PT/OT Ongoing (interventions implemented as appropriate)    Description:  Goals to be met by 4/10/18  1. Min A UBD  2. Min A LBD; assess need for AE  3. Min G/H standing at sink with RW  4. Min A VC to incorporate LE exercise into self-care tasks  5. Discuss home safety and set-up for self-care                      Time Tracking:     OT Date of Treatment: 03/10/18  OT Start Time: 1411  OT Stop Time: 1502  OT Total Time (min): 51 min    Billable Minutes:Evaluation 27  Self Care/Home Management 24    PRUDENCIO Devi  3/10/2018    "

## 2018-03-10 NOTE — PT/OT/SLP PROGRESS
Physical Therapy Treatment    Patient Name:  Alan Gutiérrez   MRN:  [id number]    Recommendations:     Discharge Recommendations:  home, home with home health, home health PT   Discharge Equipment Recommendations: none   Barriers to discharge: None    Assessment:     Alan Gutiérrez is a 77 y.o. male admitted with a medical diagnosis of Primary localized osteoarthrosis of the knee, right.  He presents with the following impairments/functional limitations:  weakness, impaired endurance, decreased lower extremity function, pain, impaired balance, gait instability.  Pt apppears safe and independent with functional mobility.  Rehab Prognosis:  Excellent; patient would benefit from acute skilled PT services to address these deficits and reach maximum level of function.      Recent Surgery: Procedure(s) (LRB):  REPLACEMENT-KNEE WITH NAVIGATION (Right) 1 Day Post-Op    Plan:     During this hospitalization, patient to be seen BID to address the above listed problems via gait training, therapeutic activities, therapeutic exercises  · Plan of Care Expires:  04/09/18   Plan of Care Reviewed with: patient, spouse    Subjective     Communicated with patient prior to session.  Patient found in bed supine upon PT entry to room, agreeable to treatment.      Chief Complaint: none  Patient comments/goals: feels good  Pain/Comfort:  · Pain Rating 1: 3/10  · Location - Side 1: Right  · Location 1: knee  · Pain Addressed 1: Pre-medicate for activity, Distraction  · Pain Rating Post-Intervention 1: 4/10    Patients cultural, spiritual, Church conflicts given the current situation: none mentioned    Objective:     Patient found with: peripheral IV, hemovac     General Precautions: Standard, fall   Orthopedic Precautions:Full weight bearing   Braces: N/A     Functional Mobility:  · Bed Mobility:     · Supine to Sit: modified independence  · Sit to Supine: supervision  · Transfers:     · Sit to Stand:  supervision with rolling  walker  · Gait: amb 150' with RW supervision vc to even steps  · Balance: good standing dynamic balance      AM-PAC 6 CLICK MOBILITY  Turning over in bed (including adjusting bedclothes, sheets and blankets)?: 4  Sitting down on and standing up from a chair with arms (e.g., wheelchair, bedside commode, etc.): 4  Moving from lying on back to sitting on the side of the bed?: 4  Moving to and from a bed to a chair (including a wheelchair)?: 4  Need to walk in hospital room?: 3  Climbing 3-5 steps with a railing?: 4  Total Score: 23       Therapeutic Activities and Exercises:   In bed supine: AP x20; slr with assist x 15; QS x10;  Flex/ext stretches      Patient left supine with all lines intact, call button in reach, bed alarm on, nurse notified, wife present and in cpm 0-50..    GOALS:    Physical Therapy Goals        Problem: Physical Therapy Goal    Goal Priority Disciplines Outcome Goal Variances Interventions   Physical Therapy Goal     PT/OT, PT      Description:  Goals to be met by 3-12-18.  1. Sup<>sit mod I  2. Sit<>stand with RW mod I  3. amb 150' with RW mod I                      Time Tracking:     PT Received On: 03/10/18  PT Start Time: 0919     PT Stop Time: 1020  PT Total Time (min): 61 min     Billable Minutes: Gait Training 15, Therapeutic Activity 16 and Therapeutic Exercise 30    Treatment Type: Treatment  PT/PTA: PT           Yifan Guerra, PT  03/10/2018

## 2018-03-10 NOTE — PROGRESS NOTES
Progress Note  Medicine    Admit Date: 3/9/2018   LOS: 1 day     SUBJECTIVE:     Follow-up For:      Interval History:     No c/o    Review of Systems:  Constitutional: No fever or chills  Respiratory: No cough or shortness of breath  Cardiovascular: No chest pain or palpitations  Gastrointestinal: No nausea or vomiting  Neurological: No confusion or weakness    OBJECTIVE:     Vital Signs Range (Last 24H):  Vitals:    03/10/18 1000   BP:    Pulse: 108   Resp:    Temp:        Temp:  [97.6 °F (36.4 °C)-98.4 °F (36.9 °C)]   Pulse:  []   Resp:  [16-20]   BP: (113-169)/(59-94)   SpO2:  [95 %-99 %]     I & O (Last 24H):  Intake/Output Summary (Last 24 hours) at 03/10/18 1052  Last data filed at 03/10/18 0753   Gross per 24 hour   Intake           2492.5 ml   Output             2285 ml   Net            207.5 ml       Physical Exam:  General appearance: Well developed, well nourished  Eyes:  Conjunctivae/corneas clear. PERRL.  Lungs: Normal respiratory effort,   clear to auscultation bilaterally   Heart: Regular rate and rhythm, S1, S2 normal, no murmur, rub or virgilio.  Abdomen: Soft, non-tender non-distended; bowel sounds normal; no masses,  no organomegaly  Extremities: No cyanosis or clubbing. No edema.  Right leg in flexing machine  Skin: Skin color, texture, turgor normal. No rashes or lesions  Neurologic: Normal strength and tone. No focal numbness or weakness     Laboratory Data:    Recent Labs  Lab 03/10/18  0515   WBC 8.63   RBC 4.31*   HGB 12.5*   HCT 38.4*      MCV 89   MCH 29.0   MCHC 32.6     No results for input(s): NA, K, CL, CO2, BUN, CREATININE, LABGLOM, GLUCOSE, CALCIUM, PHOS in the last 168 hours.  Lab Results   Component Value Date    CALCIUM 9.3 03/02/2018    PHOS 2.7 01/10/2018     No results found for: IRON, TIBC, FERRITIN, SATURATEDIRO    Medications:  Medication list was reviewed and changes noted under Assessment/Plan.    Diagnostic Results:    X-Ray Knee 1 or 2 View Right   Final  Result    Right knee arthroplasty          Electronically signed by: JENNIFER HALE MD   Date:     03/09/18   Time:    09:32           ASSESSMENT/PLAN:     1. Right knee repair (M17.11): per therapy and ortho teams  2. Hyperlipidemia (E78.5): continue statin  3. HTN (I12.9): continue home meds with strict hold parameters. Clonidine dosing changed to PRN while inpatient. Patient's wife to eval BP at home and call with hypotension  4. Atrial fibrillation with chronic anticoagulation (I48.91, Z79.01): will place on tele, continue meds. Followed by Dr. Pressley.  5. CHF (I50.9): per Dr. Pressley, monitor I/O.  6. DVT prophy: apixaban 2.5 mg BID, CARMELO and SCD per ortho    OK for d/c home. Continue prior medications

## 2018-03-10 NOTE — PLAN OF CARE
Problem: Physical Therapy Goal  Goal: Physical Therapy Goal  Goals to be met by 3-12-18.  1. Sup<>sit mod I  2. Sit<>stand with RW mod I  3. amb 150' with RW mod I     -    Comments: Pt apppears safe and independent with functional mobility.

## 2018-03-10 NOTE — PLAN OF CARE
Problem: Physical Therapy Goal  Goal: Physical Therapy Goal  Goals to be met by 3-12-18.  1. Sup<>sit mod I  2. Sit<>stand with RW mod I  3. amb 150' with RW mod I     -    Comments: Good progress in therapy.  Pt appears safe and independent with all functional mobility. Anticipate d/c home 3-11-18.

## 2018-03-10 NOTE — PT/OT/SLP PROGRESS
Physical Therapy Treatment    Patient Name:  Alan Gutiérrez   MRN:  [id number]    Recommendations:     Discharge Recommendations:  home health PT, home health OT   Discharge Equipment Recommendations: none   Barriers to discharge: None    Assessment:     Alan Gutiérrez is a 77 y.o. male admitted with a medical diagnosis of Primary localized osteoarthrosis of the knee, right.  He presents with the following impairments/functional limitations:  weakness, impaired endurance, gait instability, impaired functional mobilty, impaired balance, decreased lower extremity function, pain .  Good progress in therapy.  Pt appears safe and independent with all functional mobility. Anticipate d/c home 3-11-18.    Rehab Prognosis:  excellent; patient would benefit from acute skilled PT services to address these deficits and reach maximum level of function.      Recent Surgery: Procedure(s) (LRB):  REPLACEMENT-KNEE WITH NAVIGATION (Right) 1 Day Post-Op    Plan:     During this hospitalization, patient to be seen BID to address the above listed problems via gait training, therapeutic activities, therapeutic exercises  · Plan of Care Expires:  04/09/18   Plan of Care Reviewed with: patient, spouse    Subjective     Communicated with patient and wife prior to session.  Patient found in bed supine upon PT entry to room, agreeable to treatment.      Chief Complaint: R knee pain  Patient comments/goals: feels good about progress  Pain/Comfort:  · Pain Rating 1: 4/10  · Location - Side 1: Left  · Location 1: knee  · Pain Addressed 1: Pre-medicate for activity, Distraction  · Pain Rating Post-Intervention 1: 4/10    Patients cultural, spiritual, Christian conflicts given the current situation: none mentioned    Objective:     Patient found with: SCD, telemetry, peripheral IV     General Precautions: Standard, fall   Orthopedic Precautions:Full weight bearing   Braces: N/A     Functional Mobility:  · Bed Mobility:     · Supine to Sit:  modified independence  · Sit to Supine: modified independence  · Transfers:     · Sit to Stand:  supervision with rolling walker  · Gait: amb 150' with RW and SBA  · Balance: good standing dynamic balance      AM-PAC 6 CLICK MOBILITY  Turning over in bed (including adjusting bedclothes, sheets and blankets)?: 4  Sitting down on and standing up from a chair with arms (e.g., wheelchair, bedside commode, etc.): 4  Moving from lying on back to sitting on the side of the bed?: 4  Moving to and from a bed to a chair (including a wheelchair)?: 4  Need to walk in hospital room?: 3  Climbing 3-5 steps with a railing?: 4  Total Score: 23       Therapeutic Activities and Exercises:   In bed supine: AP x20; slr with assist x 15; QS x10;  Flex/ext stretches      Patient left supine with all lines intact, call button in reach, bed alarm on, nursese notified and wife present..    GOALS:    Physical Therapy Goals        Problem: Physical Therapy Goal    Goal Priority Disciplines Outcome Goal Variances Interventions   Physical Therapy Goal     PT/OT, PT      Description:  Goals to be met by 3-12-18.  1. Sup<>sit mod I  2. Sit<>stand with RW mod I  3. amb 150' with RW mod I                      Time Tracking:     PT Received On: 03/10/18  PT Start Time: 1545     PT Stop Time: 1614  PT Total Time (min): 29 min     Billable Minutes:15 GT; 14 TE      Treatment Type: Treatment  PT/PTA: PT           Yifan Guerra, PT  03/10/2018

## 2018-03-10 NOTE — PROGRESS NOTES
Had a good night. Little knee pain.  Denies breathlessness/ palpitations.    Vitals:    03/10/18 0400 03/10/18 0600 03/10/18 0614 03/10/18 0719   BP:   (!) 146/70 131/70   BP Location:    Left arm   Patient Position:    Sitting   Pulse: 92 78 87 91   Resp:    18   Temp:    98.3 °F (36.8 °C)   TempSrc:    Oral   SpO2:    95%   Weight:       Height:           Chest clear  Cor: Irregularly irregular. No gallop  Ext: warm. No calf tenderness.    Doing well day post op RTKA.

## 2018-03-10 NOTE — PLAN OF CARE
Problem: Patient Care Overview  Goal: Plan of Care Review  Outcome: Ongoing (interventions implemented as appropriate)  Pt remains free from injury or falls. Vital signs stable throughout night on room air. Telemetry maintained,  Ace wrap to right leg dry and intact, Hemovac x 1,  polar ice to site continuously. Bower draining to gravity, clear yellow.   Pain managed with scheduled IV Tylenol, no complaints of nausea. Spouse at bedside, bed in low locked position and call light within reach.  Will continue to monitor.

## 2018-03-10 NOTE — PLAN OF CARE
Problem: Patient Care Overview  Goal: Plan of Care Review  Outcome: Ongoing (interventions implemented as appropriate)  Patient on RA. Sats 95%. IS done. Pt. in no distress, will continue to monitor.

## 2018-03-11 VITALS
RESPIRATION RATE: 18 BRPM | OXYGEN SATURATION: 94 % | HEART RATE: 98 BPM | HEIGHT: 68 IN | SYSTOLIC BLOOD PRESSURE: 144 MMHG | BODY MASS INDEX: 25.01 KG/M2 | WEIGHT: 165 LBS | DIASTOLIC BLOOD PRESSURE: 73 MMHG | TEMPERATURE: 99 F

## 2018-03-11 LAB
ERYTHROCYTE [DISTWIDTH] IN BLOOD BY AUTOMATED COUNT: 14.4 %
HCT VFR BLD AUTO: 37.6 %
HGB BLD-MCNC: 12.3 G/DL
MCH RBC QN AUTO: 28.9 PG
MCHC RBC AUTO-ENTMCNC: 32.7 G/DL
MCV RBC AUTO: 88 FL
PLATELET # BLD AUTO: 227 K/UL
PMV BLD AUTO: 10 FL
RBC # BLD AUTO: 4.26 M/UL
WBC # BLD AUTO: 11.29 K/UL

## 2018-03-11 PROCEDURE — 25000003 PHARM REV CODE 250: Performed by: NURSE PRACTITIONER

## 2018-03-11 PROCEDURE — 36415 COLL VENOUS BLD VENIPUNCTURE: CPT

## 2018-03-11 PROCEDURE — 97110 THERAPEUTIC EXERCISES: CPT

## 2018-03-11 PROCEDURE — 97116 GAIT TRAINING THERAPY: CPT

## 2018-03-11 PROCEDURE — 97530 THERAPEUTIC ACTIVITIES: CPT

## 2018-03-11 PROCEDURE — 25000003 PHARM REV CODE 250

## 2018-03-11 PROCEDURE — 94760 N-INVAS EAR/PLS OXIMETRY 1: CPT

## 2018-03-11 PROCEDURE — 85027 COMPLETE CBC AUTOMATED: CPT

## 2018-03-11 PROCEDURE — 97535 SELF CARE MNGMENT TRAINING: CPT

## 2018-03-11 RX ADMIN — FAMOTIDINE 20 MG: 20 TABLET, FILM COATED ORAL at 09:03

## 2018-03-11 RX ADMIN — POLYETHYLENE GLYCOL 3350 17 G: 17 POWDER, FOR SOLUTION ORAL at 09:03

## 2018-03-11 RX ADMIN — METOPROLOL SUCCINATE 50 MG: 50 TABLET, EXTENDED RELEASE ORAL at 09:03

## 2018-03-11 RX ADMIN — DIGOXIN 0.12 MG: 0.12 TABLET ORAL at 09:03

## 2018-03-11 RX ADMIN — OXYCODONE HYDROCHLORIDE 5 MG: 5 TABLET ORAL at 12:03

## 2018-03-11 RX ADMIN — ALLOPURINOL 100 MG: 100 TABLET ORAL at 09:03

## 2018-03-11 RX ADMIN — FINASTERIDE 5 MG: 5 TABLET, FILM COATED ORAL at 09:03

## 2018-03-11 RX ADMIN — APIXABAN 2.5 MG: 2.5 TABLET, FILM COATED ORAL at 09:03

## 2018-03-11 RX ADMIN — LOSARTAN POTASSIUM 100 MG: 50 TABLET, FILM COATED ORAL at 09:03

## 2018-03-11 RX ADMIN — ROSUVASTATIN CALCIUM 40 MG: 10 TABLET, FILM COATED ORAL at 09:03

## 2018-03-11 RX ADMIN — OXYCODONE HYDROCHLORIDE 5 MG: 5 TABLET ORAL at 04:03

## 2018-03-11 RX ADMIN — MUPIROCIN 1 G: 20 OINTMENT TOPICAL at 09:03

## 2018-03-11 RX ADMIN — DOCUSATE SODIUM 100 MG: 100 CAPSULE, LIQUID FILLED ORAL at 09:03

## 2018-03-11 NOTE — NURSING
Eager & in agreement w/ DC. VU of DC instructions--paperwork & prescriptions passed & explained.  IV removed w/ cath tip intact, WNL. Verified HH & DME set up via [facility] & walker has arrived to pt room. Polar care & CPM packed. To be DCd home w/ family-- will be escorted downstairs via  transport team once dressed, ready & ride arrives. Free from falls, injury, or skin breakdown this hospital admission.

## 2018-03-11 NOTE — PLAN OF CARE
Problem: Patient Care Overview  Goal: Plan of Care Review  Outcome: Ongoing (interventions implemented as appropriate)  Pt remains free from injury or falls. Vital signs stable throughout night on room air. Telemetry maintained, Dressing to right knee dry and intact, polar ice to site continuously. No complaints of pain or nausea.  Spouse at bedside, bed in low locked position and call light within reach.  Will continue to monitor.     0419- Oxycodone 5 mg administered for pain. Will continue to monitor.

## 2018-03-11 NOTE — PT/OT/SLP DISCHARGE
Physical Therapy Discharge Summary    Name: Alan Gutiérrez  MRN: [id number]   Principal Problem: Primary localized osteoarthrosis of the knee, right     Patient Discharged from acute Physical Therapy on 3/11/18.  Please refer to prior PT noted date on 3/11/18 for functional status.     Assessment:     Goals partially met.    Objective:     GOALS:    Physical Therapy Goals     Not on file          Multidisciplinary Problems (Resolved)        Problem: Physical Therapy Goal    Goal Priority Disciplines Outcome Goal Variances Interventions   Physical Therapy Goal   (Resolved)     PT/OT, PT Outcome(s) achieved     Description:  Goals to be met by 3-12-18.    Patient will increase functional independence with mobility by performin. Supine<>sit supervision  2. Sit<>stand with RW with supervision. -- MET 3/11/18  3. Gait > 150' with RW supervision.  4. Ascend/descend curb step with rolling walker and CGA. -- MET 3/11/18                       Reasons for Discontinuation of Therapy Services  Transfer to alternate level of care.      Plan:     Patient Discharged to: Home with Home Health Service.    Haydee Calderon, PT  3/11/2018

## 2018-03-11 NOTE — PT/OT/SLP PROGRESS
Occupational Therapy   Treatment/Discharge    Name: Alan Gutiérrez  MRN: [id number]  Admitting Diagnosis:  Primary localized osteoarthrosis of the knee, right  2 Days Post-Op, s/p Right TKA    Recommendations:     Discharge Recommendations: home health OT, home health PT  Discharge Equipment Recommendations:  none  Barriers to discharge:  Inaccessible home environment    Subjective     Communicated with: patient prior to session.  He was seen on the second attempt, with the patient agreeable to OT treatment.  Pain/Comfort:  · Pain Rating 1: 2/10  · Location - Side 1: Left  · Location - Orientation 1: generalized  · Location 1: knee  · Pain Addressed 1: Reposition, Distraction  · Pain Rating Post-Intervention 1: 4/10    Patients cultural, spiritual, Mormonism conflicts given the current situation: none    Objective:     Patient found with: telemetry, cryotherapy, peripheral IV    General Precautions: Standard, fall   Orthopedic Precautions:Full weight bearing   Braces: N/A     Occupational Performance:    Bed Mobility:    · SBA supine>sit EOB    Functional Mobility/Transfers:  · Min A/CGA sit><stand with RW  · Min A toilet transfer with RW    He ambulated bed>bathroom>chair with RW CGA (difficulty standing erect when ambulating with occasional need to assist pt in regaining his balance.    Activities of Daily Living:  · Independent UBD ([facility] gown, don shirt) seated in chair  · Min A LBD (don briefs, slacks, doff socks, don shoes, shoe horn used to assist don shoes) seated in the chair with RW used for standing    Patient left up in chair with call button in reach, nurse notified and patient's wife present    AM PAC 6 Click:  AM PAC Total Score: 20    Treatment & Education:  Integrating LE exercise into ADLs (pt resistant, needs encouragement), pain management during activity, LBD, transfer training, safety with standing self-care task, improving safety with pt supervision at home discussed, along with home  safety and set-up for self-care and home activity recommendations. He needs encouragement to exercise his knee with transfers and ADLs.  He was SBA bed mobility, Min A/CGA sit><stand, Min A toilet transfer, and CGA ambulation with RW for self-care.  Self-care tasks were MI UBD, Min A LBD, SBA G/H (apply deodorant and chalone, wash face and hands) at sink, with home set-up, supervision ADLs and activity recommendations addressed during the session.Education:    Assessment:     Alan Gutiérrez is a 77 y.o. male with a medical diagnosis of Primary localized osteoarthrosis of the knee, right, s/p Right TKA.  He presents with Performance deficits affecting function are impaired endurance, impaired self care skills, impaired functional mobilty, pain, decreased safety awareness, decreased lower extremity function, decreased upper extremity function, impaired balance, gait instability, edema, decreased ROM and low pain tolerance effected performance during the session.   D/C OT, hospital discharge planned for this morning.        Rehab Prognosis:  good; patient would benefit from acute skilled OT services to address these deficits and reach maximum level of function.       Plan:     Patient to be seen daily to address the above listed problems via self-care/home management, therapeutic activities, therapeutic exercises  · Plan of Care Expires: 04/10/18  · Plan of Care Reviewed with: patient    This Plan of care has been discussed with the patient who was involved in its development and understands and is in agreement with the identified goals and treatment plan    GOALS:    Occupational Therapy Goals     Not on file          Multidisciplinary Problems (Resolved)        Problem: Occupational Therapy Goal    Goal Priority Disciplines Outcome Interventions   Occupational Therapy Goal   (Resolved)     OT, PT/OT Outcome(s) achieved    Description:  Goals to be met by 4/10/18  1. Min A UBD (MET 3/11/18)  2. Min A LBD; assess  need for AE (MET 3/11/18)  3. Min G/H standing at sink with RW (MET 3/11/18)  4. Min A VC to incorporate LE exercise into self-care tasks NOT MET  5. Discuss home safety and set-up for self-care (MET 3/11/18)                       Time Tracking:     OT Date of Treatment: 03/11/18  OT Start Time: 1028  OT Stop Time: 1120  OT Total Time (min): 52 min    Billable Minutes:Self Care/Home Management 52    PRUDENCIO Devi  3/11/2018

## 2018-03-11 NOTE — PLAN OF CARE
Problem: Occupational Therapy Goal  Goal: Occupational Therapy Goal  Goals to be met by 4/10/18  1. Min A UBD (MET 3/11/18)  2. Min A LBD; assess need for AE (MET 3/11/18)  3. Min G/H standing at sink with RW (MET 3/11/18)  4. Min A VC to incorporate LE exercise into self-care tasks NOT MET  5. Discuss home safety and set-up for self-care (MET 3/11/18)     Outcome: Outcome(s) achieved Date Met: 03/11/18  The patient needs encouragement to exercise his knee with transfers and ADLs.  He was SBA bed mobility, Min A/CGA sit><stand, Min A toilet transfer, and CGA ambulation with RW for self-care.  Self-care tasks were MI UBD, Min A LBD, SBA G/H (apply deodorant and chalone, wash face and hands) at sink, with home set-up, supervision ADLs and activity recommendations addressed during the session.  PRUDENCIO Devi 3/11/2018         no

## 2018-03-11 NOTE — PROGRESS NOTES
Progress Note  Medicine    Admit Date: 3/9/2018   LOS: 2 days     SUBJECTIVE:     Follow-up For:      Interval History:     No c/o    Review of Systems:  Constitutional: No fever or chills  Respiratory: No cough or shortness of breath  Cardiovascular: No chest pain or palpitations  Gastrointestinal: No nausea or vomiting  Neurological: No confusion or weakness    OBJECTIVE:     Vital Signs Range (Last 24H):  Vitals:    03/11/18 1000   BP:    Pulse: 98   Resp:    Temp:        Temp:  [98.1 °F (36.7 °C)-98.8 °F (37.1 °C)]   Pulse:  []   Resp:  [17-19]   BP: (120-173)/(73-87)   SpO2:  [94 %-98 %]     I & O (Last 24H):    Intake/Output Summary (Last 24 hours) at 03/11/18 1216  Last data filed at 03/11/18 0610   Gross per 24 hour   Intake             1680 ml   Output             1090 ml   Net              590 ml       Physical Exam:  General appearance: Well developed, well nourished  Eyes:  Conjunctivae/corneas clear. PERRL.  Lungs: Normal respiratory effort,   clear to auscultation bilaterally   Heart: Regular rate and rhythm, S1, S2 normal, no murmur, rub or virgilio.  Abdomen: Soft, non-tender non-distended; bowel sounds normal; no masses,  no organomegaly  Extremities: No cyanosis or clubbing. No edema.  Right leg in flexing machine  Skin: Skin color, texture, turgor normal. No rashes or lesions  Neurologic: Normal strength and tone. No focal numbness or weakness     Laboratory Data:  No results for input(s): WBC, RBC, HGB, HCT, PLT, MCV, MCH, MCHC in the last 24 hours.  No results for input(s): NA, K, CL, CO2, BUN, CREATININE, LABGLOM, GLUCOSE, CALCIUM, PHOS in the last 168 hours.  Lab Results   Component Value Date    CALCIUM 9.3 03/02/2018    PHOS 2.7 01/10/2018     No results found for: IRON, TIBC, FERRITIN, SATURATEDIRO    Medications:  Medication list was reviewed and changes noted under Assessment/Plan.    Diagnostic Results:    X-Ray Knee 1 or 2 View Right   Final Result    Right knee arthroplasty           Electronically signed by: JENNIFER HALE MD   Date:     03/09/18   Time:    09:32           ASSESSMENT/PLAN:     1. Right knee repair (M17.11): per therapy and ortho teams  2. Hyperlipidemia (E78.5): continue statin  3. HTN (I12.9): continue home meds with strict hold parameters. Clonidine dosing changed to PRN while inpatient. Patient's wife to eval BP at home and call with hypotension  4. Atrial fibrillation with chronic anticoagulation (I48.91, Z79.01): will place on tele, continue meds. Followed by Dr. Pressley.  5. CHF (I50.9): per Dr. Pressley, monitor I/O.  6. DVT prophy: apixaban 2.5 mg BID, CARMLEO and SCD per ortho    OK for d/c home. Continue prior medications

## 2018-03-11 NOTE — PT/OT/SLP PROGRESS
Physical Therapy Treatment    Patient Name:  Alan Gutiérrez   MRN:  [id number]    Recommendations:     Discharge Recommendations:    Home health PT/OT  Discharge Equipment Recommendations: none (owns appropriate DME)   Barriers to discharge: None    Assessment:     Alan Gutiérrez is a 77 y.o. male admitted with a medical diagnosis of Primary localized osteoarthrosis of the knee, right.  He presents with the following impairments/functional limitations:  pain, decreased ROM, weakness, impaired joint extensibility, impaired self care skills, impaired functional mobilty, impaired cognition, impaired posture. Pt has met below goals and is functionally ready for discharge home with continued PT in home setting from PT standpoint with spouse assist. He has completed curb step training and given LE HEP handout (poor recall, spouse assist for recall of exercises). No further acute PT needs.     Rehab Prognosis:  Good; patient would benefit from acute skilled PT services to address these deficits and reach maximum level of function.      Recent Surgery: Procedure(s) (LRB):  REPLACEMENT-KNEE WITH NAVIGATION (Right) 2 Days Post-Op    Plan:     During this hospitalization, patient to be seen BID to address the above listed problems via gait training, therapeutic activities, therapeutic exercises  · Plan of Care Expires:  04/09/18   Plan of Care Reviewed with: patient, spouse    Subjective     Communicated with nurse prior to session.  Patient found reclined in chair with spouse present upon PT entry to room, agreeable to treatment.      Chief Complaint: pain  Patient comments/goals: agreeable for curb step training  Pain/Comfort:  Pain Rating 1: 7/10 (with R knee flexion)  Location - Side 1: Right  Location 1: knee  Pain Addressed 1: Cessation of Activity, Nurse notified  Pain Rating Post-Intervention 1: 5/10 (at rest)    Patients cultural, spiritual, Yazidism conflicts given the current situation: none  "mentioned    Objective:     Patient found with: cryotherapy     General Precautions: Standard,  (fall risk)   Orthopedic Precautions:Full weight bearing   Braces: N/A     Functional Mobility:  · Bed Mobility:     · Transfers:     · Sit to Stand:  supervision with rolling walker and verbal cues for tolerable R knee flexion with transfers  · Gait: x 10 ft before and after curb step training with rolling walker and SBA.   · Stairs:  Pt ascended/descended 6" curb step with Rolling Walker with no handrails with Contact Guard Assistance and x 2 trials with verbal cues for sequencing.       AM-PAC 6 CLICK MOBILITY  Turning over in bed (including adjusting bedclothes, sheets and blankets)?: 4  Sitting down on and standing up from a chair with arms (e.g., wheelchair, bedside commode, etc.): 4  Moving from lying on back to sitting on the side of the bed?: 4  Moving to and from a bed to a chair (including a wheelchair)?: 4  Need to walk in hospital room?: 3  Climbing 3-5 steps with a railing?: 3  Total Score: 22       Therapeutic Activities and Exercises:   Pt given LE exercise handout (pt and spouse did not attend pre-op joint class).   Pt performed therapeutic exercises bilaterally while reclined in chair with LEs elevated including ankle pumps, quad sets, glute sets x 10 reps, mini heels slides x 10 reps with verbal and tactile cues  Pt performed sitting therapeutic exercises including R knee flexion, R long arc quads, bilateral ankle pumps x 10 reps with verbal and visual cues.   Review of exercise handout.         Patient left reclined in chair with all lines intact, call button in reach, nurse notified and spouse present..    GOALS:    Physical Therapy Goals     Not on file          Multidisciplinary Problems (Resolved)        Problem: Physical Therapy Goal    Goal Priority Disciplines Outcome Goal Variances Interventions   Physical Therapy Goal   (Resolved)     PT/OT, PT Outcome(s) achieved     Description:  Goals to be " met by 3-12-18.    Patient will increase functional independence with mobility by performin. Supine<>sit supervision  2. Sit<>stand with RW with supervision. -- MET 3/11/18  3. Gait > 150' with RW supervision.  4. Ascend/descend curb step with rolling walker and CGA. -- MET 3/11/18                       Time Tracking:     PT Received On: 18  PT Start Time: 1123     PT Stop Time: 1201  PT Total Time (min): 38 min     Billable Minutes: Gait Training 12, Therapeutic Activity 14 and Therapeutic Exercise 12    Treatment Type: Treatment  PT/PTA: PT     PTA Visit Number: 0     Haydee Calderon, PT  2018

## 2018-03-11 NOTE — PLAN OF CARE
Problem: Physical Therapy Goal  Goal: Physical Therapy Goal  Goals to be met by 3-12-18.    Patient will increase functional independence with mobility by performin. Supine<>sit supervision  2. Sit<>stand with RW with supervision. -- MET 3/11/18  3. Gait > 150' with RW supervision.  4. Ascend/descend curb step with rolling walker and CGA. -- MET 3/11/18     Outcome: Outcome(s) achieved Date Met: 18  Pt has met above goals and is functionally ready for discharge home with continued PT in home setting from PT standpoint with spouse assist. He has completed curb step training and given LE HEP handout (poor recall, spouse assist for recall of exercises). No further acute PT needs.

## 2018-03-11 NOTE — PLAN OF CARE
Problem: Patient Care Overview  Goal: Plan of Care Review  Outcome: Ongoing (interventions implemented as appropriate)  Patient on RA. Sats 94%. IS done. Pt. in no distress, will continue to monitor.

## 2018-03-14 ENCOUNTER — HOSPITAL ENCOUNTER (EMERGENCY)
Facility: OTHER | Age: 78
Discharge: HOME | End: 2018-03-15
Attending: EMERGENCY MEDICINE
Payer: MEDICARE

## 2018-03-14 DIAGNOSIS — K59.03 DRUG-INDUCED CONSTIPATION: Primary | ICD-10-CM

## 2018-03-14 PROCEDURE — 25000003 PHARM REV CODE 250: Performed by: EMERGENCY MEDICINE

## 2018-03-14 PROCEDURE — 99283 EMERGENCY DEPT VISIT LOW MDM: CPT

## 2018-03-14 RX ADMIN — ENEMA 1 ENEMA: 19; 7 ENEMA RECTAL at 10:03

## 2018-03-15 VITALS
TEMPERATURE: 98 F | HEIGHT: 68 IN | OXYGEN SATURATION: 99 % | SYSTOLIC BLOOD PRESSURE: 129 MMHG | RESPIRATION RATE: 18 BRPM | BODY MASS INDEX: 25.01 KG/M2 | DIASTOLIC BLOOD PRESSURE: 84 MMHG | WEIGHT: 165 LBS | HEART RATE: 88 BPM

## 2018-03-15 NOTE — ED NOTES
Large amount of formed dark brown stool noted in bedside BERNA alaniz. Notified.  Pt. States he feel relieved since his Bm.

## 2018-03-15 NOTE — ED PROVIDER NOTES
Encounter Date: 3/14/2018       History     Chief Complaint   Patient presents with    Constipation     Pt had a right total knee replacement on 03/09/2018. Pt has not had a BM since 03/08. He has taken miralax, ducolax, and mag citrate with no results. Home health recommended he come to this facility.     HPI  Review of patient's allergies indicates:  No Known Allergies  Past Medical History:   Diagnosis Date    A-fib     Cancer     skin    Carotid artery stenosis 8/28/2014 8/26/2014: Carotid duplex: DIANNA: mild. LICA: critical - 3.4 m/s.    CHF (congestive heart failure)     Chronic anticoagulation 8/28/2014    Heart failure, systolic and diastolic, chronic 11/17/2016 8/1/2016: Echo: Normal left ventricular size with mild systolic dysfunction. EF 45%. Markedly dilated LA. Moderate aortic valve sclerosis - 1.6 m/s.    Herniated disc     Hypercholesterolemia 11/17/2016 2004: Began statin.    Hypertension     Hypertension 11/17/2016 1990: Diagnosed.    Lipids abnormal     OA (osteoarthritis)     Osteopenia     Peripheral artery disease 12/1/2016    10/25/2016: Arterial Duplex: Right: SFA: Distal 1.8 m/s. Left: No obstructive disease above knee.    Stroke     2004     Past Surgical History:   Procedure Laterality Date    BLADDER TUMOR EXCISION      CAROTID ENDARTERECTOMY      Left/right    EYE SURGERY      bilateral kory    HERNIA REPAIR      JOINT REPLACEMENT Right 03/09/2018    knee scope Right     RADIOFREQUENCY ABLATION BONE      back    SKIN BIOPSY      ca    TONSILLECTOMY      tumors removed chest Bilateral      History reviewed. No pertinent family history.  Social History   Substance Use Topics    Smoking status: Never Smoker    Smokeless tobacco: Never Used    Alcohol use Yes      Comment: couple times yearly      Review of Systems    Physical Exam     Initial Vitals [03/14/18 2208]   BP Pulse Resp Temp SpO2   (!) 106/47 108 19 97.6 °F (36.4 °C) 100 %      MAP        66.67         Physical Exam    ED Course   Procedures  Labs Reviewed - No data to display          Medical Decision Making:   ED Management:  The patient had a large bowel movement after receiving 2 enemas.                      Clinical Impression:   The encounter diagnosis was Drug-induced constipation.                           Tano Roach MD  03/14/18 [id number]

## 2018-03-16 NOTE — DISCHARGE SUMMARY
[facility]  Short Stay  Discharge Summary  TOTAL KNEE REPLACEMENT    Admit Date: 3/9/2018    Discharge Date and Time: 3/11/2018  3:17 PM      Discharge Attending Physician: Nicholas Gonzalez MD    Hospital Course:  Alan Gutiérrez,is a 77 y.o. male with severe osteoarthritis R knee, unrelieved with the conservative measures. The patient was admitted on  3/9/2018 and underwent R total knee replacement with computer-assisted navigation. Postoperatively, weightbearing as tolerated with a walker and CPM machine was initiated on postop day #1. Alan Gutiérrez did quite well and was discharged on 3/11/2018  with home health physical therapy and nursing. The patient will be on Percocet for pain and aspirin 325 mg p.o. b.i.d. with meals x4 weeks.  A CPM machine will will be sent home with the patient. Postoperative follow up will be in the office in 4 weeks.       Final Diagnoses:    Principal Problem: Primary localized osteoarthrosis of the knee, right   Secondary Diagnoses:   Active Hospital Problems    Diagnosis  POA   No active problems to display.      Resolved Hospital Problems    Diagnosis Date Resolved POA    *Primary localized osteoarthrosis of the knee, right [M17.11] 03/09/2018 Yes       Discharged Condition: good    Disposition: Home-Health Care [facility]    Follow up/Patient Instructions:    Medications:  Reconciled Home Medications:   Discharge Medication List as of 3/11/2018 11:18 AM      START taking these medications    Details   oxyCODONE-acetaminophen (PERCOCET) 5-325 mg per tablet 1 tab every 4 hours PRN pain or 2 tablets every 6 hours PRN pain, Print         CONTINUE these medications which have CHANGED    Details   apixaban 2.5 mg Tab Take 1 tablet (2.5 mg total) by mouth 2 (two) times daily. Take for 3 days post op then resume 5 mg dose., Starting Fri 3/9/2018, Print         CONTINUE these medications which have NOT CHANGED    Details   allopurinol (ZYLOPRIM) 100 MG tablet Take 100 mg by mouth  once daily., Until Discontinued, Historical Med      calcium citrate (CALCITRATE) 200 mg (950 mg) tablet Take 1 tablet by mouth once daily., Until Discontinued, Historical Med      cloNIDine (CATAPRES) 0.1 MG tablet take one tablet by mouth every 8 hours at 7 am, 1 pm, and 9 pm, Historical Med      digoxin (LANOXIN) 125 mcg tablet TAKE 1 TABLET ONE TIME DAILY, Normal      finasteride (PROSCAR) 5 mg tablet Take 5 mg by mouth once daily., Until Discontinued, Historical Med      furosemide (LASIX) 40 MG tablet Take 1 tablet (40 mg total) by mouth once daily., Starting 9/14/2014, Until Discontinued, Normal      losartan (COZAAR) 100 MG tablet Take 100 mg by mouth once daily., Historical Med      metoprolol succinate (TOPROL-XL) 50 MG 24 hr tablet Take 1 tablet (50 mg total) by mouth once daily., Starting Mon 1/15/2018, Normal      !! NIFEdipine (PROCARDIA-XL) 30 MG (OSM) 24 hr tablet Take 30 mg by mouth every morning., Historical Med      !! NIFEdipine (PROCARDIA-XL) 60 MG (OSM) 24 hr tablet Take 60 mg by mouth every evening., Historical Med      potassium chloride (MICRO-K) 10 MEQ CpSR Take 10 mEq by mouth once daily. , Starting Thu 7/25/2013, Historical Med      rosuvastatin (CRESTOR) 40 MG Tab Take 40 mg by mouth once daily., Historical Med      vitamin D 1000 units Tab Take 185 mg by mouth once daily., Until Discontinued, Historical Med       !! - Potential duplicate medications found. Please discuss with provider.      STOP taking these medications       ascorbic acid (VITAMIN C) 100 MG tablet Comments:   Reason for Stopping:         co-enzyme Q-10 30 mg capsule Comments:   Reason for Stopping:         fish oil-omega-3 fatty acids 300-1,000 mg capsule Comments:   Reason for Stopping:         glucosamine-chondroitin 500-400 mg tablet Comments:   Reason for Stopping:         MULTIVITAMIN W-MINERALS/LUTEIN (CENTRUM SILVER ORAL) Comments:   Reason for Stopping:               Discharge Procedure Orders  WALKER FOR HOME  "USE   Order Specific Question Answer Comments   Type of Walker: Adult (5'4"-6'6")    With wheels? Yes    Height: 5' 8" (1.727 m)    Weight: 74.8 kg (165 lb)    Length of need (1-99 months): 99      3 IN 1 COMMODE FOR HOME USE   Order Specific Question Answer Comments   Type: Standard    Height: 5' 8" (1.727 m)    Weight: 74.8 kg (165 lb)    Length of need (1-99 months): 99      CANE FOR HOME USE   Order Specific Question Answer Comments   Type of Cane: Straight    Height: 5' 8" (1.727 m)    Weight: 74.8 kg (165 lb)    Length of need (1-99 months): 99        Follow-up Information     Nicholas Gonzalez MD In 4 weeks.    Specialty:  Orthopedic Surgery  Contact information:  [address] [facility]  [facility] [address]  [phone]             [facility].    Specialties:  DME Provider, Home Health Services  Why:  Home Health   Contact information:  [address]   [place] [id number] [phone]                     "

## 2018-03-19 ENCOUNTER — OFFICE VISIT (OUTPATIENT)
Dept: CARDIOLOGY | Facility: CLINIC | Age: 78
End: 2018-03-19
Attending: INTERNAL MEDICINE
Payer: MEDICARE

## 2018-03-19 VITALS
HEIGHT: 68 IN | HEART RATE: 98 BPM | DIASTOLIC BLOOD PRESSURE: 80 MMHG | BODY MASS INDEX: 23.64 KG/M2 | WEIGHT: 156 LBS | SYSTOLIC BLOOD PRESSURE: 119 MMHG

## 2018-03-19 DIAGNOSIS — Z86.73 HISTORY OF CEREBROVASCULAR ACCIDENT: ICD-10-CM

## 2018-03-19 DIAGNOSIS — I73.9 PERIPHERAL ARTERY DISEASE: ICD-10-CM

## 2018-03-19 DIAGNOSIS — I50.42 HEART FAILURE, SYSTOLIC AND DIASTOLIC, CHRONIC: ICD-10-CM

## 2018-03-19 DIAGNOSIS — I65.23 BILATERAL CAROTID ARTERY STENOSIS: ICD-10-CM

## 2018-03-19 DIAGNOSIS — E78.00 HYPERCHOLESTEROLEMIA: ICD-10-CM

## 2018-03-19 DIAGNOSIS — I48.21 PERMANENT ATRIAL FIBRILLATION: ICD-10-CM

## 2018-03-19 DIAGNOSIS — I10 ESSENTIAL HYPERTENSION: ICD-10-CM

## 2018-03-19 DIAGNOSIS — Z79.01 CHRONIC ANTICOAGULATION: ICD-10-CM

## 2018-03-19 PROCEDURE — 3079F DIAST BP 80-89 MM HG: CPT | Mod: CPTII,S$GLB,, | Performed by: INTERNAL MEDICINE

## 2018-03-19 PROCEDURE — 93000 ELECTROCARDIOGRAM COMPLETE: CPT | Mod: S$GLB,,, | Performed by: INTERNAL MEDICINE

## 2018-03-19 PROCEDURE — 3074F SYST BP LT 130 MM HG: CPT | Mod: CPTII,S$GLB,, | Performed by: INTERNAL MEDICINE

## 2018-03-19 PROCEDURE — 99214 OFFICE O/P EST MOD 30 MIN: CPT | Mod: S$GLB,,, | Performed by: INTERNAL MEDICINE

## 2018-03-19 NOTE — PROGRESS NOTES
Subjective:     Alan Gutiérrez is a 77 y.o. male with hypertension and hypercholesterolemia. He has a healthy weight but used to be overweight. He has a history of a cerebrovascular accident in 2004 and then had right carotid endarterectomy. He was diagnosed with atrial fibrillation in 2010. He has CHADS2 score of 5 (CHAS2) and a [id number] score of 7 ([id number]) being on warfarin. He has well compensated systolic as well as diastolic heart failure. He underwent left CEA on 9/12/2014. He has received DRAKE injection which has helped his back pain. He has been bothered by swelling of especially the left ankle which has improved after he began wrapping the legs. He presented with severe pain in the right knee on 1/7/2018 and had hemarthrosis of the right knee in the setting of having had an elevated INR and a knee injection. He denies any chest pain or dyspnea. No palpitations or weak spells. No bleeding.    Congestive Heart Failure   Presents for follow-up visit. Pertinent negatives include no abdominal pain, chest pain, chest pressure, claudication, edema, fatigue, muscle weakness, near-syncope, nocturia, orthopnea, palpitations, paroxysmal nocturnal dyspnea, shortness of breath or unexpected weight change. The symptoms have been stable.   Atrial Fibrillation   Presents for follow-up visit. Symptoms include hypertension. Symptoms are negative for bradycardia, chest pain, dizziness, hemodynamic instability, hypotension, palpitations, shortness of breath, syncope, tachycardia and weakness. The symptoms have been stable. Past medical history includes atrial fibrillation, CHF and hyperlipidemia.   Cerebrovascular Accident   The current episode started today. The problem has been unchanged. Pertinent negatives include no abdominal pain, anorexia, arthralgias, change in bowel habit, chest pain, chills, congestion, coughing, diaphoresis, fatigue, fever, headaches, joint swelling, myalgias, nausea, neck pain, numbness,  rash, sore throat, swollen glands, urinary symptoms, vertigo, visual change, vomiting or weakness.   Hypertension   This is a chronic problem. The current episode started today. The problem is unchanged. The problem is controlled (usually 120-140/70-80 mmHg at home). Pertinent negatives include no anxiety, blurred vision, chest pain, headaches, malaise/fatigue, neck pain, orthopnea, palpitations, peripheral edema, PND, shortness of breath or sweats. There is no history of chronic renal disease.   Hyperlipidemia   This is a chronic problem. The current episode started more than 1 year ago. The problem is controlled. Recent lipid tests were reviewed and are normal. He has no history of chronic renal disease, diabetes, hypothyroidism, liver disease, obesity or nephrotic syndrome. Pertinent negatives include no chest pain, focal sensory loss, focal weakness, leg pain, myalgias or shortness of breath.       Review of Systems   Constitution: Negative for chills, diaphoresis, fatigue, fever, weakness, malaise/fatigue and unexpected weight change.   HENT: Negative for congestion and sore throat.    Eyes: Negative for blurred vision, vision loss in left eye and vision loss in right eye.   Cardiovascular: Negative for chest pain, claudication, dyspnea on exertion, irregular heartbeat, near-syncope, orthopnea, palpitations, paroxysmal nocturnal dyspnea and syncope.   Respiratory: Negative for cough, shortness of breath and wheezing.    Endocrine: Negative for cold intolerance.   Hematologic/Lymphatic: Negative for bleeding problem. Does not bruise/bleed easily.   Skin: Negative for rash.   Musculoskeletal: Positive for joint pain (right knee). Negative for arthralgias, falls, gout, joint swelling, muscle weakness, myalgias and neck pain.   Gastrointestinal: Negative for abdominal pain, anorexia, change in bowel habit, hematochezia, hemorrhoids, jaundice, melena, nausea and vomiting.   Genitourinary: Negative for frequency,  "hematuria and nocturia.   Neurological: Negative for dizziness, focal weakness, headaches, light-headedness, loss of balance, numbness and vertigo.   Psychiatric/Behavioral: Negative for altered mental status, depression and memory loss. The patient is not nervous/anxious.    Allergic/Immunologic: Negative for hives and persistent infections.       Current Outpatient Prescriptions on File Prior to Visit   Medication Sig Dispense Refill    allopurinol (ZYLOPRIM) 100 MG tablet Take 100 mg by mouth once daily.      apixaban 2.5 mg Tab Take 1 tablet (2.5 mg total) by mouth 2 (two) times daily. Take for 3 days post op then resume 5 mg dose. 4 tablet 0    calcium citrate (CALCITRATE) 200 mg (950 mg) tablet Take 1 tablet by mouth once daily.      cloNIDine (CATAPRES) 0.1 MG tablet take one tablet by mouth every 8 hours at 7 am, 1 pm, and 9 pm  1    digoxin (LANOXIN) 125 mcg tablet TAKE 1 TABLET ONE TIME DAILY 90 tablet 3    finasteride (PROSCAR) 5 mg tablet Take 5 mg by mouth once daily.      furosemide (LASIX) 40 MG tablet Take 1 tablet (40 mg total) by mouth once daily. 30 tablet 11    losartan (COZAAR) 100 MG tablet Take 100 mg by mouth once daily.      metoprolol succinate (TOPROL-XL) 50 MG 24 hr tablet Take 1 tablet (50 mg total) by mouth once daily. 90 tablet 3    NIFEdipine (PROCARDIA-XL) 30 MG (OSM) 24 hr tablet Take 30 mg by mouth every morning.      NIFEdipine (PROCARDIA-XL) 60 MG (OSM) 24 hr tablet Take 60 mg by mouth every evening.      oxyCODONE-acetaminophen (PERCOCET) 5-325 mg per tablet 1 tab every 4 hours PRN pain or 2 tablets every 6 hours PRN pain 90 tablet 0    potassium chloride (MICRO-K) 10 MEQ CpSR Take 10 mEq by mouth once daily.       rosuvastatin (CRESTOR) 40 MG Tab Take 40 mg by mouth once daily.      vitamin D 1000 units Tab Take 185 mg by mouth once daily.       No current facility-administered medications on file prior to visit.        /80   Pulse 98   Ht 5' 8" (1.727 m) "   Wt 70.8 kg (156 lb)   BMI 23.72 kg/m²       Objective:     Physical Exam   Constitutional: He is oriented to person, place, and time. He appears well-developed and well-nourished. He does not appear ill. No distress.   HENT:   Head: Normocephalic and atraumatic.   Nose: Nose normal.   Eyes: Right eye exhibits no discharge. Left eye exhibits no discharge. Right conjunctiva is not injected. Left conjunctiva is not injected. Right pupil is round. Left pupil is round. Pupils are equal.   Neck: Neck supple. No JVD present. Carotid bruit is not present. No thyromegaly present.   Cardiovascular: Normal rate, S1 normal and S2 normal.  An irregularly irregular rhythm present.  No extrasystoles are present. PMI is not displaced.  Exam reveals no gallop.    Murmur heard.   Harsh midsystolic murmur is present with a grade of 2/6  at the upper right sternal border radiating to the neck  Pulses:       Radial pulses are 2+ on the right side, and 2+ on the left side.        Femoral pulses are 2+ on the right side, and 2+ on the left side.       Dorsalis pedis pulses are 2+ on the right side, and 1+ on the left side.        Posterior tibial pulses are 2+ on the right side, and 0 on the left side.   Pulmonary/Chest: Effort normal and breath sounds normal.   Abdominal: Soft. Normal appearance. There is no hepatosplenomegaly. There is no tenderness.   Musculoskeletal:        Right ankle: He exhibits swelling. He exhibits no ecchymosis and no deformity.        Left ankle: He exhibits swelling. He exhibits no ecchymosis and no deformity.   Lymphadenopathy:        Head (right side): No submandibular adenopathy present.        Head (left side): No submandibular adenopathy present.     He has no cervical adenopathy.   Neurological: He is alert and oriented to person, place, and time. He is not disoriented. No cranial nerve deficit or sensory deficit.   Skin: Skin is warm, dry and intact. No rash noted. He is not diaphoretic. Nails show  no clubbing.   Psychiatric: He has a normal mood and affect. His speech is normal and behavior is normal. Judgment and thought content normal. Cognition and memory are normal.       Assessment:     1. Heart failure, systolic and diastolic, chronic    2. Permanent atrial fibrillation    3. Chronic anticoagulation    4. Bilateral carotid artery stenosis    5. History of cerebrovascular accident    6. Peripheral artery disease    7. Essential hypertension    8. Hypercholesterolemia        Plan:     1. Heart Failure, Systolic & Diastolic, Chronic              8/1/2016: Echo: Normal left ventricular size with mild systolic dysfunction. EF 45%. Markedly dilated LA. Moderate aortic valve sclerosis - 1.6 m/s.              1/8/2018: Normal left ventricular size with low normal systolic dysfunction. EF 50-55%. Anteroseptal wall mildly hypokinetic. Mild LVH. Markedly dilated LA. Moderate aortic valve sclerosis - 1.5 m/s.               On losartan 100 mg Q24, metoprolol 50 mg Q24 and furosemide 40 mg Q24.              Off ramipril 10 mg Q24 due to dry cough.   Previously seen edema of ankles well controlled with pressure stockings.              Appears reasonably well compensated.          2. Atrial Fibrillation              2010: Diagnosed with permanent atrial fibrillation.              CHADS2=4 (CHAS2). [id number]=6 ([id number])              On warfarin.              On metoprolol 25 mg Q24 and digoxin 0.25 mg Q24.   1/15/2018:  bpm.              Rate appears high.   Increase metoprolol to 50 mg Q24.   1/23/2018: Holter: Atrial fibrillation 81 () bpm. Briefly in 30s at night. Longest RR 2.1 sec.   On metoprolol 50 mg Q24 and digoxin 0.125 mg Q24.    Rate well controlled.     3. Chronic Anticoagulation              2010: Diagnosed with permanent atrial fibrillation.              CHADS2=4 (CHAS2). [id number]=4 ([id number]).              Used to be on warfarin. Anticoagulation managed by Dr. Pressley.   1/13/2018:  Began apixiban.   On apixiban 5 mg Q12.              No aspirin or NSAID.   No bleeding.                 4. Carotid Artery Stenosis              2004: Right CEA.              8/26/2014: Carotid Duplex: DIANNA: Mild. LICA: Critical - 3.4 m/s.              9/11/2014: [facility] [place]: 4V: DIANNA: Mild. Left Vert: 90%. LICA: 95%.              9/2014: Left CEA.              12/1/2016: Carotid Duplex: Moderate plaquing.              12/13/2017: Carotid Duplex: Moderate plaquing.              No need for aspirin.              12/2018: Plan next Carotid Duplex. Then yearly.     5. History of Cerebrovascular Accident              5/2004: CVA. Left sided hemiplegia. Received tPA. Good recovery.              7/2004: Left temporal CVA. Weak.     6. Peripheral Artery Disease              10/25/2016: Arterial Duplex: Right: SFA: Distal 1.8 m/s. Left: No obstructive disease above knee.              Asymptomatic.     7. Hypertension              1990: Diagnosed.               On losartan 100 mg Q24, metoprolol 50 mg Q24, clonidine 0.1 mg Q8, nifedipine 30 mg & 60 mg Q12 and furosemide 40 mg Q24.              Keeping log at home              Well controlled.     8. Hypercholesterolemia              2004: Began statin.              On rosuvastatin 40 mg Q24.              Tells me well controlled.     9. History of Knee Replacement   1/5/2018: Right knee pain became severe.   3/9/2018: Right knee replacement.              Appears to have had hemarthrosis.   Dr. Nicholas Gonzalez.    10. Hyperuricemia   2008: Diagnosed.   On allopurinol 100 mg q24    11. Primary Care   Dr. Dave Luna Jr..    F/u 4 months.    Zachary Pressley M.D.

## 2018-03-27 DIAGNOSIS — M25.561 RIGHT KNEE PAIN: Primary | ICD-10-CM

## 2018-04-06 ENCOUNTER — CLINICAL SUPPORT (OUTPATIENT)
Dept: REHABILITATION | Facility: HOSPITAL | Age: 78
End: 2018-04-06
Payer: MEDICARE

## 2018-04-06 DIAGNOSIS — Z96.651 STATUS POST RIGHT KNEE REPLACEMENT: ICD-10-CM

## 2018-04-06 DIAGNOSIS — R29.898 WEAKNESS OF RIGHT LOWER EXTREMITY: ICD-10-CM

## 2018-04-06 PROCEDURE — 97161 PT EVAL LOW COMPLEX 20 MIN: CPT | Mod: PN

## 2018-04-06 PROCEDURE — 97110 THERAPEUTIC EXERCISES: CPT | Mod: PN

## 2018-04-06 PROCEDURE — G8978 MOBILITY CURRENT STATUS: HCPCS | Mod: CL,PN

## 2018-04-06 PROCEDURE — G8979 MOBILITY GOAL STATUS: HCPCS | Mod: CK,PN

## 2018-04-06 NOTE — PLAN OF CARE
Physical Therapy Evaluation    Name: Alan Gutiérrez  [facility] Number: [id number]    Diagnosis:   Encounter Diagnoses   Name Primary?    Status post right knee replacement     Weakness of right lower extremity      Physician: Nicholas Gonzalez MD  Treatment Orders: PT Eval and Treat    Past Medical History:   Diagnosis Date    A-fib     Cancer     skin    Carotid artery stenosis 8/28/2014 8/26/2014: Carotid duplex: DIANNA: mild. LICA: critical - 3.4 m/s.    CHF (congestive heart failure)     Chronic anticoagulation 8/28/2014    Heart failure, systolic and diastolic, chronic 11/17/2016 8/1/2016: Echo: Normal left ventricular size with mild systolic dysfunction. EF 45%. Markedly dilated LA. Moderate aortic valve sclerosis - 1.6 m/s.    Herniated disc     Hypercholesterolemia 11/17/2016 2004: Began statin.    Hypertension     Hypertension 11/17/2016 1990: Diagnosed.    Lipids abnormal     OA (osteoarthritis)     Osteopenia     Peripheral artery disease 12/1/2016    10/25/2016: Arterial Duplex: Right: SFA: Distal 1.8 m/s. Left: No obstructive disease above knee.    Stroke     2004     Current Outpatient Prescriptions   Medication Sig    allopurinol (ZYLOPRIM) 100 MG tablet Take 100 mg by mouth once daily.    apixaban 2.5 mg Tab Take 1 tablet (2.5 mg total) by mouth 2 (two) times daily. Take for 3 days post op then resume 5 mg dose.    calcium citrate (CALCITRATE) 200 mg (950 mg) tablet Take 1 tablet by mouth once daily.    cloNIDine (CATAPRES) 0.1 MG tablet take one tablet by mouth every 8 hours at 7 am, 1 pm, and 9 pm    digoxin (LANOXIN) 125 mcg tablet TAKE 1 TABLET ONE TIME DAILY    finasteride (PROSCAR) 5 mg tablet Take 5 mg by mouth once daily.    furosemide (LASIX) 40 MG tablet Take 1 tablet (40 mg total) by mouth once daily.    losartan (COZAAR) 100 MG tablet Take 100 mg by mouth once daily.    metoprolol succinate (TOPROL-XL) 50 MG 24 hr tablet Take 1 tablet (50 mg total) by mouth  once daily.    NIFEdipine (PROCARDIA-XL) 30 MG (OSM) 24 hr tablet Take 30 mg by mouth every morning.    NIFEdipine (PROCARDIA-XL) 60 MG (OSM) 24 hr tablet Take 60 mg by mouth every evening.    oxyCODONE-acetaminophen (PERCOCET) 5-325 mg per tablet 1 tab every 4 hours PRN pain or 2 tablets every 6 hours PRN pain    potassium chloride (MICRO-K) 10 MEQ CpSR Take 10 mEq by mouth once daily.     rosuvastatin (CRESTOR) 40 MG Tab Take 40 mg by mouth once daily.    vitamin D 1000 units Tab Take 185 mg by mouth once daily.     No current facility-administered medications for this visit.      Review of patient's allergies indicates:  No Known Allergies    Evaluation Date: 4/6/2018  Visit # authorized:   Authorization period:   To Vendor Referred By By Location/POS By Department   none Nicholas Gonzalez MD [facility]   Priority Start Date Expiration Date Referral Entered By   Routine 03/27/2018 12/31/2018 Sruthi Valente   Visits Requested Visits Authorized Visits Completed Visits Scheduled   1 20 1 0         Time Start:11:00 am  Time End: 12:00 am  Total min: 60 min    Subjective     Alan LOPEZ is a 77 y.o. male that presents to [facility] outpatient clinic secondary to R TKA.. Onset of surgery 3/9/18. Pt had right knee replacement. Pt had therapy prior knee surgery. Pt states he takes medication for pain control. Pt states pain is localized on right knee.     Precautions: R TKA   Patient c/o: continuous/intermittent symptoms  Radicular symptoms: No  Onset:: insidious/sudden/gradual/ Surgery  Pain Scale: Rates pain on a scale of 0-10 to be 7 at worst; 2 currently; 1 at best .  Aggravating factors: Walking  Relieving factors: sitting down or lying down  Previous treatment: ESIs in R knee and back- last knee injection on Jan 4th, 2018 and HH/outpatient PT with some improvements with continuation of HEP  Past surgical history: knee scope about 25 years, 6 months ago lumbar  denervation surgery  Functional deficits: walking long distances, driving, prolonged standing, stair ambulation, household chores  Prior level of function: Independent  Occupation: Retired, work duties include:   Environment:  FWW AD  No cultural or spiritual barriers identified to treatment or learning.  Patient's goals: Pt's goals are decrease knee pain      Objective     Observation:    Alignment: intact    Wound/ incision: No infection and redness. Normal skin temperature.    Posture Alignment: slouched posture;forward head;increased kyphosis, B knee flexed     Sensation: intact to light touch    DTR: intact to LE     GAIT DEVIATIONS: Alan LOPEZ displays decreased step length;decreased base of support;flexed posturing;B knee flexion     Range of Motion:   Knee Left active Left Passive   Flexion 127 130   Extension 0 0     Knee Right active Right Passive   Flexion 105 110   Extension -11 -9       Lower Extremity Strength   Right LE  Left LE    Knee extension: NP due to surgery  Knee extension: 4+/5   Knee flexion: NP due to surgery  Knee flexion: 4+/5   Hip flexion: NP due to surgery  Hip flexion: 4+/5   Hip extension:  NP due to surgery  Hip extension: 3/5   Hip abduction: {NP due to surgery  Hip abduction: 4/5   Hip adduction: NP due to surgery  Hip adduction 3+/5   Ankle dorsiflexion: 4/5 Ankle dorsiflexion: 4+/5   Ankle plantarflexion: 5/5 Ankle plantarflexion: 5/5     Joint Mobility:    Patellar sup./inf: hypomonility   Patellar med/lat: hypomobility    Palpation: Pain anterior/ medial and lateral knee.     Edema slightly to moderate     CMS Impairment/Limitation/Restriction for FOTO Knee Survey  Status Limitation G-Code CMS Severity Modifier  Intake 39% 61% Current Status CL - At least 60 percent but less than 80 percent  Predicted 54% 46% Goal Status+ CK - At least 40 percent but less than 60 percent  D/C Status CL **only report if this is a one time visit  +Based on FOTO predicted change score    PT  Evaluation Completed? Yes  Discussed Plan of Care with patient: Yes    TREATMENT:  Alan LOPEZ received therapeutic exercises to develop strength and endurance, flexibility for 10 minutes including:see exercise sheet   Heel slides  Heel props  Quad sets    Alan LOPEZ  received the following manual therapy techniques x 0 min. To include Joint mobilizations and Soft tissue Mobilization were applied to the: N/A To include: N/A     Pt. Received cold pack x 0 min. To N/A. Following treatment.    HEP provided:   Instructed pt. Regarding: Proper technique with all exercises. Pt demo good understanding of the education provided. Alan LOPEZ demonstrated good return demonstration of activities.      Assessment     This is a 77 y.o. male referred to outpatient physical therapy and presents with a medical diagnosis of Right knee replacement and demonstrates limitations as described in the problem list. Pt will benefit from physcial therapy services in order to maximize pain free and/or functional use of right knee. Pt presents with post-surgical right knee. Pt demonstrated decrease in right knee  ROM. Pt also demonstrated decrease in right lower extremity muscle strength. Pt has pain during palpation in anterior, medial, and lateral knee. The following goals were discussed with the patient and patient is in agreement with them as to be addressed in the treatment plan. Pt was given a HEP consisting of range of motion. Pt verbally understood the instructions as they were given and demonstrated proper form and technique during therapy. Pt was advised to perform these exercises free of pain, and to stop performing them if pain occurs.     History  Co-morbidities and personal factors that may impact the plan of care Examination  Body Structures and Functions, activity limitations and participation restrictions that may impact the plan of care    Clinical Presentation   Co-morbidities:   prior lumbar surgery        Personal Factors:   no  deficits Body Regions:   lower extremities    Body Systems:    ROM  strength  gait  transfers            Participation Restrictions:   Community ambulation      Activity limitations:   Learning and applying knowledge  no deficits    General Tasks and Commands  no deficits    Communication  no deficits    Mobility  walking    Self care  no deficits    Domestic Life  no deficits    Interactions/Relationships  no deficits    Life Areas  no deficits    Community and Social Life  community life         stable and uncomplicated                      Complexity: low  See FOTO outcome assessment            Prognosis: Good    Anticipated barriers to physical therapy: N/a    Medical necessity is demonstrated by the following IMPAIRMENTS/PROBLEM LIST:   1)Increase in pain level limiting function   2)Decrease ROM   3)Decrease muscle strength   4)Lack of HEP    GOALS: Short Term Goals:  4 weeks  1.Report decreased in pain at worse less than  <   / =  3  /10  to increase tolerance for functional mobility.  2. Pt to improve right knee Active range of motion to 115 degrees  or greater to allow for improved functional mobility to allow for improvement in IADLs.   3. Increased  Right knee MMT 1/2 grade to increase tolerance for ADL and work activities.  4. Pt to tolerate HEP to improve ROM and independence with ADL's  5. Pt will improve right knee extension to -5 or less to o allow for improved functional mobility to allow for improvement in IADLs.    Long Term Goals: 6-8 weeks  1.Report decreased in pain at worse less than  <   / =  0 or 1/10  to increase tolerance for functional mobility.  2.Pt to improve  Right knee active  range of motion to 120 degrees or greater to allow for improved functional mobility to allow for improvement in IADLs.   3.Increased right knee  MMT 1 grade to increase tolerance for ADL and work activities.  4. Pt will report At least 40 percent but less than 60 percent on FOTO outcome assessment  to  demonstrate increase in LE function with every day tasks.   5. Pt to be Independent with HEP to improve ROM and independence with ADL's  6.Pt will improve right knee extension to 0 or less to o allow for improved functional mobility to allow for improvement in IADLs.      Plan     Pt will be treated by physical therapy 1-2 times a week for 8-12 weeks for Pt Education, HEP, therapeutic exercises, neuromuscular re-education, joint mobilizations, Iontophoresis with dexamethasone sodium phosphate, manual therapy, functional dry needling, taping techniques and modalities prn to achieve established goals. Alan LOPEZ may at times be seen by a PTA as part of the Rehab Team.     Cont PT for  8-12 weeks.     Certification date:4/6/18 to 7/6/18    I certify the need for these services furnished under this plan of treatment and while under my care.______________________________ Physician/Referring Practitioner  Date of Signature      Quinn Keith PT, DPT  Date:4/6/18

## 2018-04-06 NOTE — PROGRESS NOTES
Physical Therapy Evaluation    Name: Alan Gutiérrez  [facility] Number: [id number]    Diagnosis:   Encounter Diagnoses   Name Primary?    Status post right knee replacement     Weakness of right lower extremity      Physician: Nicholas Gonzalez MD  Treatment Orders: PT Eval and Treat    Past Medical History:   Diagnosis Date    A-fib     Cancer     skin    Carotid artery stenosis 8/28/2014 8/26/2014: Carotid duplex: DIANNA: mild. LICA: critical - 3.4 m/s.    CHF (congestive heart failure)     Chronic anticoagulation 8/28/2014    Heart failure, systolic and diastolic, chronic 11/17/2016 8/1/2016: Echo: Normal left ventricular size with mild systolic dysfunction. EF 45%. Markedly dilated LA. Moderate aortic valve sclerosis - 1.6 m/s.    Herniated disc     Hypercholesterolemia 11/17/2016 2004: Began statin.    Hypertension     Hypertension 11/17/2016 1990: Diagnosed.    Lipids abnormal     OA (osteoarthritis)     Osteopenia     Peripheral artery disease 12/1/2016    10/25/2016: Arterial Duplex: Right: SFA: Distal 1.8 m/s. Left: No obstructive disease above knee.    Stroke     2004     Current Outpatient Prescriptions   Medication Sig    allopurinol (ZYLOPRIM) 100 MG tablet Take 100 mg by mouth once daily.    apixaban 2.5 mg Tab Take 1 tablet (2.5 mg total) by mouth 2 (two) times daily. Take for 3 days post op then resume 5 mg dose.    calcium citrate (CALCITRATE) 200 mg (950 mg) tablet Take 1 tablet by mouth once daily.    cloNIDine (CATAPRES) 0.1 MG tablet take one tablet by mouth every 8 hours at 7 am, 1 pm, and 9 pm    digoxin (LANOXIN) 125 mcg tablet TAKE 1 TABLET ONE TIME DAILY    finasteride (PROSCAR) 5 mg tablet Take 5 mg by mouth once daily.    furosemide (LASIX) 40 MG tablet Take 1 tablet (40 mg total) by mouth once daily.    losartan (COZAAR) 100 MG tablet Take 100 mg by mouth once daily.    metoprolol succinate (TOPROL-XL) 50 MG 24 hr tablet Take 1 tablet (50 mg total) by mouth  once daily.    NIFEdipine (PROCARDIA-XL) 30 MG (OSM) 24 hr tablet Take 30 mg by mouth every morning.    NIFEdipine (PROCARDIA-XL) 60 MG (OSM) 24 hr tablet Take 60 mg by mouth every evening.    oxyCODONE-acetaminophen (PERCOCET) 5-325 mg per tablet 1 tab every 4 hours PRN pain or 2 tablets every 6 hours PRN pain    potassium chloride (MICRO-K) 10 MEQ CpSR Take 10 mEq by mouth once daily.     rosuvastatin (CRESTOR) 40 MG Tab Take 40 mg by mouth once daily.    vitamin D 1000 units Tab Take 185 mg by mouth once daily.     No current facility-administered medications for this visit.      Review of patient's allergies indicates:  No Known Allergies    Evaluation Date: 4/6/2018  Visit # authorized:   Authorization period:   To Vendor Referred By By Location/POS By Department   none Nicholas Gonzalez MD [facility]   Priority Start Date Expiration Date Referral Entered By   Routine 03/27/2018 12/31/2018 Sruthi Valente   Visits Requested Visits Authorized Visits Completed Visits Scheduled   1 20 1 0         Time Start:11:00 am  Time End: 12:00 am  Total min: 60 min    Subjective     Alan LOPEZ is a 77 y.o. male that presents to [facility] outpatient clinic secondary to R TKA.. Onset of surgery 3/9/18. Pt had right knee replacement. Pt had therapy prior knee surgery. Pt states he takes medication for pain control. Pt states pain is localized on right knee.     Precautions: R TKA   Patient c/o: continuous/intermittent symptoms  Radicular symptoms: No  Onset:: insidious/sudden/gradual/ Surgery  Pain Scale: Rates pain on a scale of 0-10 to be 7 at worst; 2 currently; 1 at best .  Aggravating factors: Walking  Relieving factors: sitting down or lying down  Previous treatment: ESIs in R knee and back- last knee injection on Jan 4th, 2018 and HH/outpatient PT with some improvements with continuation of HEP  Past surgical history: knee scope about 25 years, 6 months ago lumbar  denervation surgery  Functional deficits: walking long distances, driving, prolonged standing, stair ambulation, household chores  Prior level of function: Independent  Occupation: Retired, work duties include:   Environment:  FWW AD  No cultural or spiritual barriers identified to treatment or learning.  Patient's goals: Pt's goals are decrease knee pain      Objective     Observation:    Alignment: intact    Wound/ incision: No infection and redness. Normal skin temperature.     Posture Alignment: slouched posture;forward head;increased kyphosis, B knee flexed     Sensation: intact to light touch    DTR: intact to LE     GAIT DEVIATIONS: Alan LOPEZ displays decreased step length;decreased base of support;flexed posturing;B knee flexion     Range of Motion:   Knee Left active Left Passive   Flexion 127 130   Extension 0 0     Knee Right active Right Passive   Flexion 105 110   Extension -11 -9       Lower Extremity Strength   Right LE  Left LE    Knee extension: NP due to surgery  Knee extension: 4+/5   Knee flexion: NP due to surgery  Knee flexion: 4+/5   Hip flexion: NP due to surgery  Hip flexion: 4+/5   Hip extension:  NP due to surgery  Hip extension: 3/5   Hip abduction: {NP due to surgery  Hip abduction: 4/5   Hip adduction: NP due to surgery  Hip adduction 3+/5   Ankle dorsiflexion: 4/5 Ankle dorsiflexion: 4+/5   Ankle plantarflexion: 5/5 Ankle plantarflexion: 5/5     Joint Mobility:    Patellar sup./inf: hypomonility   Patellar med/lat: hypomobility    Palpation: Pain anterior/ medial and lateral knee.     Edema slightly to moderate     CMS Impairment/Limitation/Restriction for FOTO Knee Survey  Status Limitation G-Code CMS Severity Modifier  Intake 39% 61% Current Status CL - At least 60 percent but less than 80 percent  Predicted 54% 46% Goal Status+ CK - At least 40 percent but less than 60 percent  D/C Status CL **only report if this is a one time visit  +Based on FOTO predicted change score    PT  Evaluation Completed? Yes  Discussed Plan of Care with patient: Yes    TREATMENT:  Alan LOPEZ received therapeutic exercises to develop strength and endurance, flexibility for 10 minutes including:see exercise sheet   Heel slides  Heel props  Quad sets    Alan LOPEZ  received the following manual therapy techniques x 0 min. To include Joint mobilizations and Soft tissue Mobilization were applied to the: N/A To include: N/A     Pt. Received cold pack x 0 min. To N/A. Following treatment.    HEP provided:   Instructed pt. Regarding: Proper technique with all exercises. Pt demo good understanding of the education provided. Alan LOPEZ demonstrated good return demonstration of activities.      Assessment     This is a 77 y.o. male referred to outpatient physical therapy and presents with a medical diagnosis of Right knee replacement and demonstrates limitations as described in the problem list. Pt will benefit from physcial therapy services in order to maximize pain free and/or functional use of right knee. Pt presents with post-surgical right knee. Pt demonstrated decrease in right knee  ROM. Pt also demonstrated decrease in right lower extremity muscle strength. Pt has pain during palpation in anterior, medial, and lateral knee. The following goals were discussed with the patient and patient is in agreement with them as to be addressed in the treatment plan. Pt was given a HEP consisting of range of motion. Pt verbally understood the instructions as they were given and demonstrated proper form and technique during therapy. Pt was advised to perform these exercises free of pain, and to stop performing them if pain occurs.     History  Co-morbidities and personal factors that may impact the plan of care Examination  Body Structures and Functions, activity limitations and participation restrictions that may impact the plan of care    Clinical Presentation   Co-morbidities:   prior lumbar surgery        Personal Factors:   no  deficits Body Regions:   lower extremities    Body Systems:    ROM  strength  gait  transfers            Participation Restrictions:   Community ambulation      Activity limitations:   Learning and applying knowledge  no deficits    General Tasks and Commands  no deficits    Communication  no deficits    Mobility  walking    Self care  no deficits    Domestic Life  no deficits    Interactions/Relationships  no deficits    Life Areas  no deficits    Community and Social Life  community life         stable and uncomplicated                      Complexity: low  See FOTO outcome assessment            Prognosis: Good    Anticipated barriers to physical therapy: N/a    Medical necessity is demonstrated by the following IMPAIRMENTS/PROBLEM LIST:   1)Increase in pain level limiting function   2)Decrease ROM   3)Decrease muscle strength   4)Lack of HEP    GOALS: Short Term Goals:  4 weeks  1.Report decreased in pain at worse less than  <   / =  3  /10  to increase tolerance for functional mobility.  2. Pt to improve right knee Active range of motion to 115 degrees  or greater to allow for improved functional mobility to allow for improvement in IADLs.   3. Increased  Right knee MMT 1/2 grade to increase tolerance for ADL and work activities.  4. Pt to tolerate HEP to improve ROM and independence with ADL's  5. Pt will improve right knee extension to -5 or less to o allow for improved functional mobility to allow for improvement in IADLs.    Long Term Goals: 6-8 weeks  1.Report decreased in pain at worse less than  <   / =  0 or 1/10  to increase tolerance for functional mobility.  2.Pt to improve  Right knee active  range of motion to 120 degrees or greater to allow for improved functional mobility to allow for improvement in IADLs.   3.Increased right knee  MMT 1 grade to increase tolerance for ADL and work activities.  4. Pt will report At least 40 percent but less than 60 percent on FOTO outcome assessment  to  demonstrate increase in LE function with every day tasks.   5. Pt to be Independent with HEP to improve ROM and independence with ADL's  6.Pt will improve right knee extension to 0 or less to o allow for improved functional mobility to allow for improvement in IADLs.      Plan     Pt will be treated by physical therapy 1-2 times a week for 8-12 weeks for Pt Education, HEP, therapeutic exercises, neuromuscular re-education, joint mobilizations, Iontophoresis with dexamethasone sodium phosphate, manual therapy, functional dry needling, taping techniques and modalities prn to achieve established goals. Alan LOPEZ may at times be seen by a PTA as part of the Rehab Team.     Cont PT for  8-12 weeks.     Certification date:4/6/18 to 7/6/18    I certify the need for these services furnished under this plan of treatment and while under my care.______________________________ Physician/Referring Practitioner  Date of Signature      Quinn Keith PT, DPT  Date:4/6/18

## 2018-04-11 ENCOUNTER — CLINICAL SUPPORT (OUTPATIENT)
Dept: REHABILITATION | Facility: HOSPITAL | Age: 78
End: 2018-04-11
Payer: MEDICARE

## 2018-04-11 DIAGNOSIS — R29.898 WEAKNESS OF RIGHT LOWER EXTREMITY: ICD-10-CM

## 2018-04-11 DIAGNOSIS — Z96.651 STATUS POST RIGHT KNEE REPLACEMENT: Primary | ICD-10-CM

## 2018-04-11 PROCEDURE — 97110 THERAPEUTIC EXERCISES: CPT | Mod: PN

## 2018-04-11 NOTE — PROGRESS NOTES
Physical Therapy Daily Note     Name: Alan LOPEZ [facility] Number: [id number]  Diagnosis:   Encounter Diagnoses   Name Primary?    Status post right knee replacement Yes    Weakness of right lower extremity      Physician: Nicholas Gonzalez MD  Precautions: R TKA  Visit #: 2 of 20  PTA Visit #: 1    Time In: 1452  Time Out: 1552  Total Treatment Time: 60 minutes (1:1 with PTA 30 minutes of treatment session)    Subjective     Pt reports: Rinku states his right knee is a little painful today. Patient states he is exercising at home. Patient states his knee is crooked it just wont go straight. Patient states it has just been that way for years and years.   Pain Scale: Alan LOPEZ rates pain on a scale of 0-10 to be 2 currently.    Objective     Knee Right active Right Passive   Flexion 110 116   Extension -9 -7     Alan LOPEZ received individual therapeutic exercises to develop strength, endurance, ROM, flexibility, posture and core stabilization for 45 minutes including:  Nu step x 6 minutes   Seated LAQ 2x10, 3 sec hold ea position  Long sitting gastroc stretch with strap 20 sec x 4   Heel prop x 2 minutes x 3# x 2 trials   PTA overpressure into knee extension 15 sec x 3   Quad set 3x10, 5 sec hold   SAQ 2x10 (big red bolster), 2x10 (small blue bolster) 3 sec hold in extension   SLR 3x5  Supine heel slides (sliding board and sheet) x 4 minutes     Alan LOPEZ received the following manual therapy techniques: scar and patellar mobilizations were applied to the: right knee for 10 minutes.    Written Home Exercises Provided: Reviewed HEP provided during initial evaluation.   Pt demo good understanding of the education provided. Alan LOPEZ demonstrated good return demonstration of activities.     Education provided re:Alan LOPEZ verbalized good understanding of education provided.   No spiritual or educational barriers to learning provided    Assessment     Rinku  tolerated treatment well today. Patient with decreased patellar and scar mobility initially with significant improvements in mobility achieved with manual therapy techniques. Patient with heavy hamstring/gluteal compensation when performing quad sets with visual and tactile cues needed to promote isolated quad activation. Patient with good tolerance to passive overpressure in both knee flexion and extension directions with appropriate stretch achieved at end range.  This is a 77 y.o. male referred to outpatient physical therapy and presents with a medical diagnosis of right TKA and demonstrates limitations as described in the problem list. Pt prognosis is Excellent. Pt will continue to benefit from skilled outpatient physical therapy to address the deficits listed in the problem list, provide pt/family education and to maximize pt's level of independence in the home and community environment.     Goals as follows:  GOALS: Short Term Goals:  4 weeks  1.Report decreased in pain at worse less than  <   / =  3  /10  to increase tolerance for functional mobility.  2. Pt to improve right knee Active range of motion to 115 degrees  or greater to allow for improved functional mobility to allow for improvement in IADLs.   3. Increased  Right knee MMT 1/2 grade to increase tolerance for ADL and work activities.  4. Pt to tolerate HEP to improve ROM and independence with ADL's  5. Pt will improve right knee extension to -5 or less to o allow for improved functional mobility to allow for improvement in IADLs.     Long Term Goals: 6-8 weeks  1.Report decreased in pain at worse less than  <   / =  0 or 1/10  to increase tolerance for functional mobility.  2.Pt to improve  Right knee active  range of motion to 120 degrees or greater to allow for improved functional mobility to allow for improvement in IADLs.   3.Increased right knee  MMT 1 grade to increase tolerance for ADL and work activities.  4. Pt will report At least 40  percent but less than 60 percent on FOTO outcome assessment  to demonstrate increase in LE function with every day tasks.   5. Pt to be Independent with HEP to improve ROM and independence with ADL's  6.Pt will improve right knee extension to 0 or less to o allow for improved functional mobility to allow for improvement in IADLs.     Plan     Continue with established Plan of Care towards PT goals.    Therapist: Diana Bauer, PTA  4/11/2018

## 2018-04-13 ENCOUNTER — CLINICAL SUPPORT (OUTPATIENT)
Dept: REHABILITATION | Facility: HOSPITAL | Age: 78
End: 2018-04-13
Payer: MEDICARE

## 2018-04-13 DIAGNOSIS — M25.561 CHRONIC PAIN OF RIGHT KNEE: ICD-10-CM

## 2018-04-13 DIAGNOSIS — Z96.651 STATUS POST RIGHT KNEE REPLACEMENT: Primary | ICD-10-CM

## 2018-04-13 DIAGNOSIS — R29.898 WEAKNESS OF RIGHT LOWER EXTREMITY: ICD-10-CM

## 2018-04-13 DIAGNOSIS — G89.29 CHRONIC PAIN OF RIGHT KNEE: ICD-10-CM

## 2018-04-13 PROCEDURE — 97110 THERAPEUTIC EXERCISES: CPT | Mod: PN

## 2018-04-13 NOTE — PROGRESS NOTES
Physical Therapy Daily Note     Name: Alan LOPEZ [facility] Number: [id number]  Diagnosis:   Encounter Diagnoses   Name Primary?    Status post right knee replacement Yes    Weakness of right lower extremity     Chronic pain of right knee      Physician: Nicholas Gonzalez MD  Precautions: R TKA  Visit #: 3 of 20  PTA Visit #: 2    Time In: 1325  Time Out: 1425  Total Treatment Time: 60 minutes (1:1 with PTA 30 minutes of treatment session)    Subjective     Pt reports: Rinku states his left knee is feeling better today. Patient states he was a little sore after last session but he really enjoyed the exercises. Patient states he has minimal soreness in his left knee this afternoon and everything went well at his MD appointment yesterday. Patient states he will be able to start driving next Friday.   Pain Scale: Alan LOPEZ rates pain on a scale of 0-10 to be 2 currently.    Objective     Knee Right active Right Passive   Flexion 112 120   Extension -8 -6     Alan LOPEZ received individual therapeutic exercises to develop strength, endurance, ROM, flexibility, posture and core stabilization for 45 minutes including:  Nu step x 6 minutes   Seated LAQ 2x10, 3 sec hold ea position  Long sitting gastroc stretch with strap 20 sec x 5   Heel prop x 2 minutes x 3# x 2 trials   PTA overpressure into knee extension 15 sec x 3   Quad set 3x10, 5 sec hold   SAQ 2x10 (big red bolster), 2x10 (small blue bolster) 3 sec hold in extension   SLR 3x5  +Supine hip adduction x 2 minutes (ball squeeze)  +Supine hip abduction (GTB) x 2 minutes   Supine heel slides (sliding board and sheet) x 4 minutes     Alan LOPEZ received the following manual therapy techniques: scar and patellar mobilizations were applied to the: right knee for 10 minutes.    Written Home Exercises Provided: Reviewed HEP provided during initial evaluation.   Pt demo good understanding of the education provided.  Alan LOPEZ demonstrated good return demonstration of activities.     Education provided re:lAan LOPEZ verbalized good understanding of education provided.   No spiritual or educational barriers to learning provided    Assessment     Rinku tolerated treatment well today. Patient demonstrates improvements in active knee extension today with improvements in ability to achieve isolated quad activation noted.. Patient able to progress to hip stabilization exercises today without complaints of pain with appropriate training effect achieved. Patient continues to progress consistently towards normalized knee flexion ROM with appropriate stretch achieved at end range.      This is a 77 y.o. male referred to outpatient physical therapy and presents with a medical diagnosis of right TKA and demonstrates limitations as described in the problem list. Pt prognosis is Excellent. Pt will continue to benefit from skilled outpatient physical therapy to address the deficits listed in the problem list, provide pt/family education and to maximize pt's level of independence in the home and community environment.     Goals as follows:  GOALS: Short Term Goals:  4 weeks  1.Report decreased in pain at worse less than  <   / =  3  /10  to increase tolerance for functional mobility.  2. Pt to improve right knee Active range of motion to 115 degrees  or greater to allow for improved functional mobility to allow for improvement in IADLs.   3. Increased  Right knee MMT 1/2 grade to increase tolerance for ADL and work activities.  4. Pt to tolerate HEP to improve ROM and independence with ADL's  5. Pt will improve right knee extension to -5 or less to o allow for improved functional mobility to allow for improvement in IADLs.     Long Term Goals: 6-8 weeks  1.Report decreased in pain at worse less than  <   / =  0 or 1/10  to increase tolerance for functional mobility.  2.Pt to improve  Right knee active  range of motion to 120 degrees or  greater to allow for improved functional mobility to allow for improvement in IADLs.   3.Increased right knee  MMT 1 grade to increase tolerance for ADL and work activities.  4. Pt will report At least 40 percent but less than 60 percent on FOTO outcome assessment  to demonstrate increase in LE function with every day tasks.   5. Pt to be Independent with HEP to improve ROM and independence with ADL's  6.Pt will improve right knee extension to 0 or less to o allow for improved functional mobility to allow for improvement in IADLs.     Plan     Continue with established Plan of Care towards PT goals.    Therapist: Diana Bauer, PTA  4/13/2018

## 2018-04-17 ENCOUNTER — CLINICAL SUPPORT (OUTPATIENT)
Dept: REHABILITATION | Facility: HOSPITAL | Age: 78
End: 2018-04-17
Payer: MEDICARE

## 2018-04-17 DIAGNOSIS — R29.898 WEAKNESS OF RIGHT LOWER EXTREMITY: ICD-10-CM

## 2018-04-17 DIAGNOSIS — Z96.651 STATUS POST RIGHT KNEE REPLACEMENT: ICD-10-CM

## 2018-04-17 PROCEDURE — 97110 THERAPEUTIC EXERCISES: CPT | Mod: PN

## 2018-04-17 PROCEDURE — 97140 MANUAL THERAPY 1/> REGIONS: CPT | Mod: 59,PN

## 2018-04-17 NOTE — PROGRESS NOTES
Physical Therapy Daily Note     Name: Alan LOPEZ [facility] Number: [id number]  Diagnosis:   Encounter Diagnoses   Name Primary?    Status post right knee replacement     Weakness of right lower extremity      Physician: Nicholas Gonzalez MD  Precautions: R TKA  Visit #: 4 of 20  PTA Visit #: 3    Time In: 1458  Time Out: 1555  Total Treatment Time: 57 minutes (1:1 with PTA duration of treatment session)    Subjective     Pt reports: Rinku states his left knee feels much better. Patient states he is working on his exercises at home but he isn't pushing it as far as he should be.   Pain Scale: Alan LOPEZ rates pain on a scale of 0-10 to be 2 currently.    Objective     Knee Right active assist Right Passive   Flexion 116 123   Extension -8 -6     Alan LOPEZ received individual therapeutic exercises to develop strength, endurance, ROM, flexibility, posture and core stabilization for 45 minutes including:  Nu step x 6 minutes   Seated LAQ 2x10, 3 sec hold ea position  Long sitting gastroc stretch with strap 20 sec x 5   Heel prop x 2 minutes x 5# x 2 trials   PTA overpressure into knee extension 15 sec x 3   Quad set 3x10, 5 sec hold   SAQ 2x10x2# (big red bolster), 2x10x2# (small blue bolster) 3 sec hold in extension   SLR 4x5  Supine hip adduction x 2 minutes (ball squeeze)  Supine hip abduction (GTB) x 2 minutes   Supine heel slides (sliding board and sheet) x 4 minutes   +Standing heel raises 2x10  +Gastroc stretch at wedge 30 sec x 3     Alan LOPEZ received the following manual therapy techniques: scar and patellar mobilizations were applied to the: right knee for 10 minutes.    Written Home Exercises Provided: Reviewed HEP provided during initial evaluation.   Pt demo good understanding of the education provided. Alan LOPEZ demonstrated good return demonstration of activities.     Education provided re:Alan LOPEZ verbalized good understanding of education  provided.   No spiritual or educational barriers to learning provided    Assessment     Rinku tolerated treatment well today. Patient demonstrates improved tolerance to passive overpressure into knee extension today but stretching continues to be easily achieved in popliteal space. Patient able to progress resistance of TKE focused exercises with improving quad control noted. Patient demonstrates good tolerance to standing therapeutic exercise but heavy forward flexed posture limits patient's ability to achieve full knee extension. Patient continues to progress consistently towards normalized knee flexion ROM with appropriate stretch achieved at end range.      This is a 77 y.o. male referred to outpatient physical therapy and presents with a medical diagnosis of right TKA and demonstrates limitations as described in the problem list. Pt prognosis is Excellent. Pt will continue to benefit from skilled outpatient physical therapy to address the deficits listed in the problem list, provide pt/family education and to maximize pt's level of independence in the home and community environment.     Goals as follows:  GOALS: Short Term Goals:  4 weeks  1.Report decreased in pain at worse less than  <   / =  3  /10  to increase tolerance for functional mobility.  2. Pt to improve right knee Active range of motion to 115 degrees  or greater to allow for improved functional mobility to allow for improvement in IADLs.   3. Increased  Right knee MMT 1/2 grade to increase tolerance for ADL and work activities.  4. Pt to tolerate HEP to improve ROM and independence with ADL's  5. Pt will improve right knee extension to -5 or less to o allow for improved functional mobility to allow for improvement in IADLs.     Long Term Goals: 6-8 weeks  1.Report decreased in pain at worse less than  <   / =  0 or 1/10  to increase tolerance for functional mobility.  2.Pt to improve  Right knee active  range of motion to 120 degrees or greater  to allow for improved functional mobility to allow for improvement in IADLs.   3.Increased right knee  MMT 1 grade to increase tolerance for ADL and work activities.  4. Pt will report At least 40 percent but less than 60 percent on FOTO outcome assessment  to demonstrate increase in LE function with every day tasks.   5. Pt to be Independent with HEP to improve ROM and independence with ADL's  6.Pt will improve right knee extension to 0 or less to o allow for improved functional mobility to allow for improvement in IADLs.     Plan     Continue with established Plan of Care towards PT goals.    Therapist: Diana Baeur, PTA  4/17/2018

## 2018-04-19 ENCOUNTER — CLINICAL SUPPORT (OUTPATIENT)
Dept: REHABILITATION | Facility: HOSPITAL | Age: 78
End: 2018-04-19
Payer: MEDICARE

## 2018-04-19 DIAGNOSIS — Z96.651 STATUS POST RIGHT KNEE REPLACEMENT: Primary | ICD-10-CM

## 2018-04-19 DIAGNOSIS — M25.661 DECREASED RANGE OF MOTION OF RIGHT KNEE: ICD-10-CM

## 2018-04-19 DIAGNOSIS — M62.81 MUSCLE WEAKNESS: ICD-10-CM

## 2018-04-19 DIAGNOSIS — R29.898 WEAKNESS OF RIGHT LOWER EXTREMITY: ICD-10-CM

## 2018-04-19 DIAGNOSIS — M25.561 CHRONIC PAIN OF RIGHT KNEE: ICD-10-CM

## 2018-04-19 DIAGNOSIS — G89.29 CHRONIC PAIN OF RIGHT KNEE: ICD-10-CM

## 2018-04-19 PROCEDURE — 97110 THERAPEUTIC EXERCISES: CPT | Mod: PN

## 2018-04-19 NOTE — PROGRESS NOTES
Physical Therapy Daily Note     Name: Alan LOPEZ [facility] Number: [id number]  Diagnosis:   Encounter Diagnoses   Name Primary?    Status post right knee replacement Yes    Weakness of right lower extremity     Chronic pain of right knee     Muscle weakness     Decreased range of motion of right knee      Physician: Nicholas Gonzalez MD  Precautions: R TKA  Visit #: 5 of 20  PTA Visit #: 4    Time In: 1005  Time Out: 1110  Total Treatment Time: 65 minutes (1:1 with PTA 30' of treatment session)    Subjective     Pt reports: Rinku reports that he is feeling sore today due to having a fall yesterday.  Pt states that he fell on his bottom and he caught his self with his hands.  Patient states that his knee is okay.   Pain Scale: Alan LOPEZ rates pain on a scale of 0-10 to be 0 currently.    Objective     Knee Right active assist Right Passive   Flexion 122 125   Extension -7 -4     Alan LOPEZ received individual therapeutic exercises to develop strength, endurance, ROM, flexibility, posture and core stabilization for 45 minutes including:    Nu step x 6 minutes   Seated LAQ 2x10, 3 sec hold ea position  Long sitting gastroc stretch with strap 20 sec x 5   Heel prop x 2 minutes x 5# x 2 trials   PTA overpressure into knee extension 15 sec x 3   Quad set 3x10, 5 sec hold   SAQ 2x10x2# (big red bolster), 2x10x2# (small blue bolster) 3 sec hold in extension   SLR 4x5  Supine hip adduction x 2 minutes (ball squeeze)  Supine hip abduction (GTB) x 2 minutes   Supine heel slides (sliding board and sheet) x 4 minutes   Standing heel raises 2x10  + Standing hip abduction   + Mini squats 2 x 10   Gastroc stretch at wedge 30 sec x 3     + NBOS 2 x 30''   + SLS 2 x 15'' RLE    Alan LOPEZ received the following manual therapy techniques: scar and patellar mobilizations were applied to the: right knee for 0 minutes.    Written Home Exercises Provided: Reviewed HEP provided  during initial evaluation.   Pt demo good understanding of the education provided. Alan LOPEZ demonstrated good return demonstration of activities.     Education provided re:Alan LOPEZ verbalized good understanding of education provided.   No spiritual or educational barriers to learning provided    Assessment     Rinku tolerated treatment well today.  Patient with good tolerance to exercises with appropriate training effect achieved.  Patient demonstrated improvements in passive and active range of motion this session, however continues to lack full knee extension.  Pt with good tolerance to addition of balance activities without difficulty. PT spoke with pt regarding fall risks.   This is a 77 y.o. male referred to outpatient physical therapy and presents with a medical diagnosis of right TKA and demonstrates limitations as described in the problem list. Pt prognosis is Excellent. Pt will continue to benefit from skilled outpatient physical therapy to address the deficits listed in the problem list, provide pt/family education and to maximize pt's level of independence in the home and community environment.     Goals as follows:  GOALS: Short Term Goals:  4 weeks  1.Report decreased in pain at worse less than  <   / =  3  /10  to increase tolerance for functional mobility.  2. Pt to improve right knee Active range of motion to 115 degrees  or greater to allow for improved functional mobility to allow for improvement in IADLs.   3. Increased  Right knee MMT 1/2 grade to increase tolerance for ADL and work activities.  4. Pt to tolerate HEP to improve ROM and independence with ADL's  5. Pt will improve right knee extension to -5 or less to o allow for improved functional mobility to allow for improvement in IADLs.     Long Term Goals: 6-8 weeks  1.Report decreased in pain at worse less than  <   / =  0 or 1/10  to increase tolerance for functional mobility.  2.Pt to improve  Right knee active  range of motion to  120 degrees or greater to allow for improved functional mobility to allow for improvement in IADLs.   3.Increased right knee  MMT 1 grade to increase tolerance for ADL and work activities.  4. Pt will report At least 40 percent but less than 60 percent on FOTO outcome assessment  to demonstrate increase in LE function with every day tasks.   5. Pt to be Independent with HEP to improve ROM and independence with ADL's  6.Pt will improve right knee extension to 0 or less to o allow for improved functional mobility to allow for improvement in IADLs.     Plan     Continue with established Plan of Care towards PT goals.    Therapist: Cheri Agosto PTA & Quinn Keith  4/19/2018

## 2018-04-24 ENCOUNTER — CLINICAL SUPPORT (OUTPATIENT)
Dept: REHABILITATION | Facility: HOSPITAL | Age: 78
End: 2018-04-24
Payer: MEDICARE

## 2018-04-24 ENCOUNTER — TELEPHONE (OUTPATIENT)
Dept: CARDIOLOGY | Facility: CLINIC | Age: 78
End: 2018-04-24

## 2018-04-24 DIAGNOSIS — Z96.651 STATUS POST RIGHT KNEE REPLACEMENT: ICD-10-CM

## 2018-04-24 DIAGNOSIS — I48.21 PERMANENT ATRIAL FIBRILLATION: Primary | ICD-10-CM

## 2018-04-24 DIAGNOSIS — R29.898 WEAKNESS OF RIGHT LOWER EXTREMITY: ICD-10-CM

## 2018-04-24 PROCEDURE — 97110 THERAPEUTIC EXERCISES: CPT | Mod: PN

## 2018-04-24 NOTE — PROGRESS NOTES
Physical Therapy Daily Note     Name: Alan LOPEZ [facility] Number: [id number]  Diagnosis:   Encounter Diagnoses   Name Primary?    Status post right knee replacement     Weakness of right lower extremity      Physician: Nicholas Gonzalez MD  Precautions: R TKA  Visit #: 6 of 20  PTA Visit #: 4    Time In: 1:00 pm  Time Out: 2:00 pm   Total Treatment Time: 65 minutes (1:1 with PT  30' of treatment session)    Subjective     Pt reports: Rinku reports that he is feeling good today. Pt denies right knee pain.   Pain Scale: Alan OLPEZ rates pain on a scale of 0-10 to be 0 currently.    Objective   CMS Impairment/Limitation/Restriction for FOTO Knee Survey  Status Limitation G-Code CMS Severity Modifier  Intake 39% 61%  Predicted 54% 46% Goal Status+ CK - At least 40 percent but less than 60 percent  4/24/2018 43% 57% Current Status CK - At least 40 percent but less than 60 percent  D/C Status CK **only report if this is discharge survey  +Based on FOTO predicted change score     Knee Right active  Right Passive   Flexion 116 125   Extension -7 -4     Alan LOPEZ received individual therapeutic exercises to develop strength, endurance, ROM, flexibility, posture and core stabilization for 45 minutes including:    Nu step x 6 minutes   Seated LAQ 2x10, 3 sec hold ea position  Long sitting gastroc stretch with strap 20 sec x 5   Heel prop x 2 minutes x 5# x 2 trials   PTA overpressure into knee extension 15 sec x 3   Quad set 3x10, 5 sec hold   SAQ 2x10x2# (big red bolster), 2x10x2# (small blue bolster) 3 sec hold in extension   SLR 4x5  Supine hip adduction x 2 minutes (ball squeeze)  Supine hip abduction (GTB) x 2 minutes   Supine heel slides (sliding board and sheet) x 4 minutes   Standing heel raises 2x10  + Standing hip abduction   + Mini squats 2 x 10   Gastroc stretch at wedge 30 sec x 3     + NBOS 2 x 30''   + SLS 2 x 15'' RLE    Alan LOPEZ received the following  manual therapy techniques: scar and patellar mobilizations were applied to the: right knee for 0 minutes.    Written Home Exercises Provided: Reviewed HEP provided during initial evaluation.   Pt demo good understanding of the education provided. Alan LOPEZ demonstrated good return demonstration of activities.     Education provided re:Alan LOPEZ verbalized good understanding of education provided.   No spiritual or educational barriers to learning provided    Assessment     Rinku tolerated treatment well today. Pt demonstrated decrease in right knee flexion and extension range of motion. Pt cont to improve quadriceps and hamstring muscle strength. Pt presented with -7 degrees AROM right knee extension. Pt will begin to focus in decrease right knee extension to 0 degrees extension. Plan to work on right knee extension exercises.  PT educated proper exercises to perform at home. Cont. Skilled PT services to achieve PT and patient's goals.   This is a 77 y.o. male referred to outpatient physical therapy and presents with a medical diagnosis of right TKA and demonstrates limitations as described in the problem list. Pt prognosis is Excellent. Pt will continue to benefit from skilled outpatient physical therapy to address the deficits listed in the problem list, provide pt/family education and to maximize pt's level of independence in the home and community environment.     Goals as follows:  GOALS: Short Term Goals:  4 weeks  1.Report decreased in pain at worse less than  <   / =  3  /10  to increase tolerance for functional mobility.  2. Pt to improve right knee Active range of motion to 115 degrees  or greater to allow for improved functional mobility to allow for improvement in IADLs.   3. Increased  Right knee MMT 1/2 grade to increase tolerance for ADL and work activities.  4. Pt to tolerate HEP to improve ROM and independence with ADL's  5. Pt will improve right knee extension to -5 or less to o allow for  improved functional mobility to allow for improvement in IADLs.     Long Term Goals: 6-8 weeks  1.Report decreased in pain at worse less than  <   / =  0 or 1/10  to increase tolerance for functional mobility.  2.Pt to improve  Right knee active  range of motion to 120 degrees or greater to allow for improved functional mobility to allow for improvement in IADLs.   3.Increased right knee  MMT 1 grade to increase tolerance for ADL and work activities.  4. Pt will report At least 40 percent but less than 60 percent on FOTO outcome assessment  to demonstrate increase in LE function with every day tasks.   5. Pt to be Independent with HEP to improve ROM and independence with ADL's  6.Pt will improve right knee extension to 0 or less to o allow for improved functional mobility to allow for improvement in IADLs.     Plan     Continue with established Plan of Care towards PT goals.    Therapist: Quinn Thornton, PT & Quinn Keith  4/24/2018

## 2018-04-27 ENCOUNTER — CLINICAL SUPPORT (OUTPATIENT)
Dept: REHABILITATION | Facility: HOSPITAL | Age: 78
End: 2018-04-27
Payer: MEDICARE

## 2018-04-27 DIAGNOSIS — Z96.651 STATUS POST RIGHT KNEE REPLACEMENT: ICD-10-CM

## 2018-04-27 DIAGNOSIS — R29.898 WEAKNESS OF RIGHT LOWER EXTREMITY: ICD-10-CM

## 2018-04-27 PROCEDURE — 97110 THERAPEUTIC EXERCISES: CPT | Mod: PN

## 2018-04-27 NOTE — PROGRESS NOTES
Physical Therapy Daily Note     Name: Alan LOPEZ [facility] Number: [id number]  Diagnosis:   Encounter Diagnoses   Name Primary?    Status post right knee replacement     Weakness of right lower extremity      Physician: Nicholas Gonzalez MD  Precautions: R TKA  Visit #: 7 of 20   PN 4/27/2018  DOS:3/9/2018  PTA Visit #: 1    Time In: 1258  Time Out: 1400  Total Treatment Time: 62 minutes (1:1 with PT/PTA treatment team duration of treatment session)    Subjective     Pt reports: Rinku reports his knee is feeling good today. Patient states his walking is really starting to improve.   Pain Scale: Alan LOPEZ rates pain on a scale of 0-10 to be 0 currently.    Objective   CMS Impairment/Limitation/Restriction for FOTO Knee Survey  Status Limitation G-Code CMS Severity Modifier  Intake 39% 61%  Predicted 54% 46% Goal Status+ CK - At least 40 percent but less than 60 percent  4/24/2018 43% 57% Current Status CK - At least 40 percent but less than 60 percent  D/C Status CK **only report if this is discharge survey  +Based on FOTO predicted change score    Knee Right AROM Right Passive   Flexion 115 125   Extension -5 -2     Alan LOPEZ received individual therapeutic exercises to develop strength, endurance, ROM, flexibility, posture and core stabilization for 45 minutes including:    Nu step x 6 minutes   Seated LAQ 2x10, 3 sec hold ea position  Long sitting gastroc stretch with strap 20 sec x 5   Heel prop x 2 minutes x 5# x 2 trials   PTA overpressure into knee extension 15 sec x 3   Quad set 3x10, 5 sec hold   SAQ 2x10x2# (big red bolster), 2x10x2# (small blue bolster) 3 sec hold in extension   SLR 4x5  Supine hip adduction x 2 minutes (ball squeeze)  Supine hip abduction (GTB) x 2 minutes   Supine heel slides (sliding board and sheet) x 4 minutes     Standing heel raises 2x10  Standing hip abduction 2x10  Mini squats 2 x 10   Gastroc stretch at wedge 30 sec x 3    NBOS 2 x 30''   SLS 2 x 15'' RLE    Alan LOPEZ received the following manual therapy techniques: scar and patellar mobilizations were applied to the: right knee for 0 minutes.    Written Home Exercises Provided: Reviewed HEP provided during initial evaluation.   Pt demo good understanding of the education provided. Alan LOPEZ demonstrated good return demonstration of activities.     Education provided re:Alan LOPEZ verbalized good understanding of education provided.   No spiritual or educational barriers to learning provided    Assessment     Rinku tolerated treatment well today. Patient demonstrates decreased stability in single limb stance position with two finger assist needed to correct loss of balance. Pt presented with right knee AROM of  115 degrees and PROM of 125 degrees. Pt demonstrated AROM of right extension of -5 and PROM of -2. Pt tolerated progression of strengthening exercises well today. FOTO outcome knee survey demonstrated improvement since initial evaluation.  Plan to cont work on right knee AROM and perform muscle strength test. Cont. Skilled PT services to achieve PT and patient's goals.    PT/PTA face to face conference performed during today's treatment session. Patient's goals and POC updated today.   This is a 77 y.o. male referred to outpatient physical therapy and presents with a medical diagnosis of right TKA and demonstrates limitations as described in the problem list. Pt prognosis is Excellent. Pt will continue to benefit from skilled outpatient physical therapy to address the deficits listed in the problem list, provide pt/family education and to maximize pt's level of independence in the home and community environment.     Goals as follows:  GOALS: Short Term Goals:  4 weeks updated   1.Report decreased in pain at worse less than  <   / =  3  /10  to increase tolerance for functional mobility. (goal met)  2. Pt to improve right knee Active range of motion to 115 degrees  or greater  to allow for improved functional mobility to allow for improvement in IADLs.  (goal met)  3. Increased  Right knee MMT 1/2 grade to increase tolerance for ADL and work activities. To be performed next visit  4. Pt to tolerate HEP to improve ROM and independence with ADL's (goal met)  5. Pt will improve right knee extension to -5 or less to o allow for improved functional mobility to allow for improvement in IADLs. (goal met)     Long Term Goals: 6-8 weeks  1.Report decreased in pain at worse less than  <   / =  0 or 1/10  to increase tolerance for functional mobility.  2.Pt to improve  Right knee active  range of motion to 120 degrees or greater to allow for improved functional mobility to allow for improvement in IADLs.   3.Increased right knee  MMT 1 grade to increase tolerance for ADL and work activities.  4. Pt will report At least 40 percent but less than 60 percent on FOTO outcome assessment  to demonstrate increase in LE function with every day tasks.   5. Pt to be Independent with HEP to improve ROM and independence with ADL's  6.Pt will improve right knee extension to 0 or less to o allow for improved functional mobility to allow for improvement in IADLs.     Plan     Plan to cont work on right knee AROM and perform muscle strength test.    Continue with established Plan of Care towards PT goals.    Therapist: Diana Bauer PTA & Quinn Keith  4/27/2018

## 2018-05-02 ENCOUNTER — CLINICAL SUPPORT (OUTPATIENT)
Dept: REHABILITATION | Facility: HOSPITAL | Age: 78
End: 2018-05-02
Payer: MEDICARE

## 2018-05-02 DIAGNOSIS — R29.898 WEAKNESS OF RIGHT LOWER EXTREMITY: ICD-10-CM

## 2018-05-02 DIAGNOSIS — Z96.651 STATUS POST RIGHT KNEE REPLACEMENT: ICD-10-CM

## 2018-05-02 PROCEDURE — 97110 THERAPEUTIC EXERCISES: CPT | Mod: PN

## 2018-05-02 NOTE — PROGRESS NOTES
Physical Therapy Daily Note     Name: Alan LOPEZ [facility] Number: [id number]  Diagnosis:   Encounter Diagnoses   Name Primary?    Status post right knee replacement     Weakness of right lower extremity      Physician: Nicholas Gonzalez MD  Precautions: R TKA  Visit #: 8 of 20   PN 4/27/2018  DOS:3/9/2018  PTA Visit #: 2    Time In: 1031  Time Out: 1135  Total Treatment Time: 64 minutes (1:1 with PTA 30 minutes of treatment session)    Subjective     Pt reports: Rinku states his knee is feeling great. Pt states he wants to eventually use a cane but is not ready. Patient states he had a fall about 4 days ago. Patient states he was trying to step on a bug and he lost his balance falling onto his right bottom. Patient states he was only sore for a few days and he feels better today.   Pain Scale: Alan LOPEZ rates pain on a scale of 0-10 to be 0 currently.    Objective     Knee Right AROM Right Passive   Flexion 115 125   Extension -8 -5     Alan LOPEZ received individual therapeutic exercises to develop strength, endurance, ROM, flexibility, posture and core stabilization for 45 minutes including:    Nu step x 6 minutes   Seated LAQ 2x10x3#, 3 sec hold ea position       Long sitting gastroc stretch with strap 20 sec x 5         Heel prop x 2 minutes x 5# x 2 trials   PTA overpressure into knee extension 15 sec x 3   Quad set 3x10, 5 sec hold   SAQ 2x10x3# (big red bolster), 2x10x3# (small blue bolster) 3 sec hold in extension   SLR 4x5   Supine hip adduction x 2 minutes (ball squeeze)            Supine hip abduction (GTB) x 2 minutes           Supine heel slides (sliding board and sheet) x 4 minutes       Standing heel raises 2x10  Standing hip abduction 2x10  Mini squats 2 x 10   Gastroc stretch at wedge 30 sec x 3   NBOS 2 x 30''   SLS 2 x 15'' RLE    Alan LOPEZ received the following manual therapy techniques: scar and patellar mobilizations were applied to the:  right knee for 0 minutes.    Written Home Exercises Provided: Reviewed HEP provided during initial evaluation.   Pt demo good understanding of the education provided. Alan LOPEZ demonstrated good return demonstration of activities.     Education provided re:Alan LOPEZ verbalized good understanding of education provided.   No spiritual or educational barriers to learning provided    Assessment     Rinku tolerated treatment well today. Pt presented with right knee flexion PROM of 125 degrees and right knee extension PROM of -5. Pt demonstrates loss of stability during single leg stance and required one hand held assist in // bars. Verbal cues needed for upright posture during standing activities. Pt tolerated strengthening exercises well today.   Cont. Skilled PT services to achieve PT and patient's goals.    PT/PTA face to face conference performed during today's treatment session. Patient's goals and POC updated today.   This is a 77 y.o. male referred to outpatient physical therapy and presents with a medical diagnosis of right TKA and demonstrates limitations as described in the problem list. Pt prognosis is Excellent. Pt will continue to benefit from skilled outpatient physical therapy to address the deficits listed in the problem list, provide pt/family education and to maximize pt's level of independence in the home and community environment.     Goals as follows:  GOALS: Short Term Goals:  4 weeks updated   1.Report decreased in pain at worse less than  <   / =  3  /10  to increase tolerance for functional mobility. (goal met)  2. Pt to improve right knee Active range of motion to 115 degrees  or greater to allow for improved functional mobility to allow for improvement in IADLs.  (goal met)  3. Increased  Right knee MMT 1/2 grade to increase tolerance for ADL and work activities. To be performed next visit  4. Pt to tolerate HEP to improve ROM and independence with ADL's (goal met)  5. Pt will improve right  knee extension to -5 or less to o allow for improved functional mobility to allow for improvement in IADLs. (goal met)     Long Term Goals: 6-8 weeks  1.Report decreased in pain at worse less than  <   / =  0 or 1/10  to increase tolerance for functional mobility.  2.Pt to improve  Right knee active  range of motion to 120 degrees or greater to allow for improved functional mobility to allow for improvement in IADLs.   3.Increased right knee  MMT 1 grade to increase tolerance for ADL and work activities.  4. Pt will report At least 40 percent but less than 60 percent on FOTO outcome assessment  to demonstrate increase in LE function with every day tasks.   5. Pt to be Independent with HEP to improve ROM and independence with ADL's  6.Pt will improve right knee extension to 0 or less to o allow for improved functional mobility to allow for improvement in IADLs.     Plan     Plan to cont work on right knee AROM and perform muscle strength test.    Continue with established Plan of Care towards PT goals.    Therapist: Diana Bauer PTA & Quinn Keith  5/2/2018

## 2018-05-04 ENCOUNTER — CLINICAL SUPPORT (OUTPATIENT)
Dept: REHABILITATION | Facility: HOSPITAL | Age: 78
End: 2018-05-04
Payer: MEDICARE

## 2018-05-04 DIAGNOSIS — R29.898 WEAKNESS OF RIGHT LOWER EXTREMITY: ICD-10-CM

## 2018-05-04 DIAGNOSIS — Z96.651 STATUS POST RIGHT KNEE REPLACEMENT: ICD-10-CM

## 2018-05-04 PROCEDURE — 97110 THERAPEUTIC EXERCISES: CPT | Mod: PN

## 2018-05-04 NOTE — PROGRESS NOTES
Physical Therapy Daily Note     Name: Alan LOPEZ [facility] Number: [id number]  Diagnosis:   Encounter Diagnoses   Name Primary?    Status post right knee replacement     Weakness of right lower extremity      Physician: Nicholas Gonzalez MD  Precautions: R TKA  Visit #: 9 of 20   PN 4/27/2018  DOS:3/9/2018  PTA Visit #: 3    Time In: 1300  Time Out: 1400  Total Treatment Time: 60 minutes (1:1 with PTA 30 minutes of treatment session)    Subjective     Pt reports: Rinku states he is feeling good today and is not in any pain. Patient presents with large base quad cane and would like to start gait training.   Pain Scale: Alan LOPEZ rates pain on a scale of 0-10 to be 0 currently.    Objective     Knee Right AROM Right Passive   Flexion 115 125   Extension -10 -3     Alan LOPEZ received individual therapeutic exercises to develop strength, endurance, ROM, flexibility, posture and core stabilization for 45 minutes including:    Nu step x 6 minutes   Seated LAQ 2x10x3#, 3 sec hold ea position       Heel prop x 2 minutes x 5# x 2 trials   PTA overpressure into knee extension 15 sec x 3   Quad set 3x10, 5 sec hold (towel under ankle)  SAQ 2x10x3# (big red bolster), 2x10x3# (small blue bolster) 3 sec hold in extension   SLR 4x5   Supine hip adduction x 2 minutes (ball squeeze)            Supine heel slides (sliding board) x 3 minutes   ---progressing to AROM    +Gait training with wide base quad cane x 3 minutes  Standing heel raises 2x10  Standing hip abduction 2x10 (YTB)  Mini squats 2 x 10 (with ball?) next session  Gastroc stretch at wedge 30 sec x 3    NBOS 2 x 30''    SLS 2 x 15'' RLE  +Step ups (4 inch) 2x10 (next session)     Alan LOPEZ received the following manual therapy techniques: scar and patellar mobilizations were applied to the: right knee for 0 minutes.    Written Home Exercises Provided: Reviewed HEP provided during initial evaluation.   Pt demo good  understanding of the education provided. Alan LOPEZ demonstrated good return demonstration of activities.     Education provided re:Alan LOPEZ verbalized good understanding of education provided.   No spiritual or educational barriers to learning provided    Assessment     Rinku tolerated treatment well today. Pt received gait training with wide base quad cane and required verbal cues for correct step sequence and reciprocal gait pattern. Patient will benefit from continued gait training with cane to improve confidence and quality of gait pattern. Pt demonstrates LOB during SLS in // bars and requires 2 finger assist to maintain stability/recover loss of balance. Pt completed strengthening exercises with no complaints of pain.  This is a 77 y.o. male referred to outpatient physical therapy and presents with a medical diagnosis of right TKA and demonstrates limitations as described in the problem list. Pt prognosis is Excellent. Pt will continue to benefit from skilled outpatient physical therapy to address the deficits listed in the problem list, provide pt/family education and to maximize pt's level of independence in the home and community environment.     Goals as follows:  GOALS: Short Term Goals:  4 weeks updated   1.Report decreased in pain at worse less than  <   / =  3  /10  to increase tolerance for functional mobility. (goal met)  2. Pt to improve right knee Active range of motion to 115 degrees  or greater to allow for improved functional mobility to allow for improvement in IADLs.  (goal met)  3. Increased  Right knee MMT 1/2 grade to increase tolerance for ADL and work activities. To be performed next visit  4. Pt to tolerate HEP to improve ROM and independence with ADL's (goal met)  5. Pt will improve right knee extension to -5 or less to o allow for improved functional mobility to allow for improvement in IADLs. (goal met)     Long Term Goals: 6-8 weeks  1.Report decreased in pain at worse less than   <   / =  0 or 1/10  to increase tolerance for functional mobility.  2.Pt to improve  Right knee active  range of motion to 120 degrees or greater to allow for improved functional mobility to allow for improvement in IADLs.   3.Increased right knee  MMT 1 grade to increase tolerance for ADL and work activities.  4. Pt will report At least 40 percent but less than 60 percent on FOTO outcome assessment  to demonstrate increase in LE function with every day tasks.   5. Pt to be Independent with HEP to improve ROM and independence with ADL's  6.Pt will improve right knee extension to 0 or less to o allow for improved functional mobility to allow for improvement in IADLs.     Plan     Plan to cont work on right knee AROM and perform muscle strength test.    Continue with established Plan of Care towards PT goals.    Therapist: Diana Bauer, PTA  5/4/2018

## 2018-05-08 ENCOUNTER — CLINICAL SUPPORT (OUTPATIENT)
Dept: REHABILITATION | Facility: HOSPITAL | Age: 78
End: 2018-05-08
Payer: MEDICARE

## 2018-05-08 DIAGNOSIS — R29.898 WEAKNESS OF RIGHT LOWER EXTREMITY: ICD-10-CM

## 2018-05-08 DIAGNOSIS — I48.21 PERMANENT ATRIAL FIBRILLATION: ICD-10-CM

## 2018-05-08 DIAGNOSIS — Z96.651 STATUS POST RIGHT KNEE REPLACEMENT: ICD-10-CM

## 2018-05-08 PROCEDURE — 97110 THERAPEUTIC EXERCISES: CPT | Mod: PN

## 2018-05-08 NOTE — PROGRESS NOTES
Physical Therapy Daily Note     Name: Alan LOPEZ [facility] Number: [id number]  Diagnosis:   Encounter Diagnoses   Name Primary?    Status post right knee replacement     Weakness of right lower extremity      Physician: Nicholas Gonzalez MD  Precautions: R TKA  Visit #: 10 of 20   PN 4/27/2018  DOS:3/9/2018  PTA Visit #: 4    Time In: 1300  Time Out: 1400  Total Treatment Time: 60 minutes (1:1 with PTA duration of treatment session)    Subjective     Pt reports: Rinku states his right knee feeling fine today.  Pain Scale: Alan LOPEZ rates pain on a scale of 0-10 to be 0 currently.    Objective     Knee Right AROM Right Passive   Flexion 115 125   Extension -8 -3     Alan LOPEZ received individual therapeutic exercises to develop strength, endurance, ROM, flexibility, posture and core stabilization for 45 minutes including:    Nu step x 6 minutes   Seated LAQ 2x10x3#, 3 sec hold ea position       Heel prop x 2 minutes x 5# x 2 trials   PTA overpressure into knee extension 15 sec x 3   Quad set 3x10, 5 sec hold (towel under ankle)  SAQ 3x10x3# (small blue bolster) 3 sec hold in extension   SLR 4x5   Supine hip adduction x 2 minutes (ball squeeze)            Supine heel slides (sliding board) x 3 minutes---progressing to AROM    +Gait training with wide base quad cane x 3 minutes  Standing heel raises 2x10  Standing hip abduction 2x10 (RTB)  +Mini squats 2 x 10 (with ball)  Gastroc stretch at wedge 30 sec x 3    NBOS 2 x 30''    SLS 2 x 15'' RLE  +Step ups (4 inch) 2x10     Alan LOPEZ received the following manual therapy techniques: scar and patellar mobilizations were applied to the: right knee for 0 minutes.    Written Home Exercises Provided: Reviewed HEP provided during initial evaluation.   Pt demo good understanding of the education provided. Alan LOPEZ demonstrated good return demonstration of activities.     Education provided re:Alan LOPEZ verbalized good  understanding of education provided.   No spiritual or educational barriers to learning provided    Assessment     Rinku tolerated treatment well today. Patient continues to lack full knee extension with easily achieved posterior knee stretch with passive over pressure. Patient requires cueing to achieve allowed active knee flexion with improvements noted with breathing techniques. Patient requires verbal cues to maintain upright posture during standing activities. Patient demonstrates difficulty with mini squats due to poor gluteal activation and posterior weight shift. Patient completed exercises with no complaints of pain post treatment.  This is a 77 y.o. male referred to outpatient physical therapy and presents with a medical diagnosis of right TKA and demonstrates limitations as described in the problem list. Pt prognosis is Excellent. Pt will continue to benefit from skilled outpatient physical therapy to address the deficits listed in the problem list, provide pt/family education and to maximize pt's level of independence in the home and community environment.     Goals as follows:  GOALS: Short Term Goals:  4 weeks updated   1.Report decreased in pain at worse less than  <   / =  3  /10  to increase tolerance for functional mobility. (goal met)  2. Pt to improve right knee Active range of motion to 115 degrees  or greater to allow for improved functional mobility to allow for improvement in IADLs.  (goal met)  3. Increased  Right knee MMT 1/2 grade to increase tolerance for ADL and work activities. To be performed next visit  4. Pt to tolerate HEP to improve ROM and independence with ADL's (goal met)  5. Pt will improve right knee extension to -5 or less to o allow for improved functional mobility to allow for improvement in IADLs. (goal met)     Long Term Goals: 6-8 weeks  1.Report decreased in pain at worse less than  <   / =  0 or 1/10  to increase tolerance for functional mobility.  2.Pt to improve   Right knee active  range of motion to 120 degrees or greater to allow for improved functional mobility to allow for improvement in IADLs.   3.Increased right knee  MMT 1 grade to increase tolerance for ADL and work activities.  4. Pt will report At least 40 percent but less than 60 percent on FOTO outcome assessment  to demonstrate increase in LE function with every day tasks.   5. Pt to be Independent with HEP to improve ROM and independence with ADL's  6.Pt will improve right knee extension to 0 or less to o allow for improved functional mobility to allow for improvement in IADLs.     Plan     Plan to cont work on right knee AROM and perform muscle strength test.    Continue with established Plan of Care towards PT goals.    Therapist: Diana Bauer, PTA  5/8/2018

## 2018-05-09 ENCOUNTER — CLINICAL SUPPORT (OUTPATIENT)
Dept: REHABILITATION | Facility: HOSPITAL | Age: 78
End: 2018-05-09
Payer: MEDICARE

## 2018-05-09 DIAGNOSIS — R29.898 WEAKNESS OF RIGHT LOWER EXTREMITY: ICD-10-CM

## 2018-05-09 DIAGNOSIS — Z96.651 STATUS POST RIGHT KNEE REPLACEMENT: ICD-10-CM

## 2018-05-09 PROCEDURE — 97110 THERAPEUTIC EXERCISES: CPT | Mod: PN

## 2018-05-09 NOTE — PROGRESS NOTES
Physical Therapy Daily Note     Name: Alan LOPEZ [facility] Number: [id number]  Diagnosis:   Encounter Diagnoses   Name Primary?    Status post right knee replacement     Weakness of right lower extremity      Physician: Nicholas Gonzalez MD  Precautions: R TKA  Visit #: 11 of 20   PN 4/27/2018  DOS:3/9/2018  PTA Visit #: 4    Time In: 10:30 am  Time Out: 11:30 am  Total Treatment Time: 60 minutes (1:1 with PT duration of 30'  treatment session)    Subjective     Pt reports: Rinku states his right knee feeling fine today. Pt states he is going out of town for 1 week or 2 weeks. Pt reports that he will return to therapy after.  Pain Scale: Alan LOPEZ rates pain on a scale of 0-10 to be 0 currently.    Objective   CMS Impairment/Limitation/Restriction for FOTO Knee Survey  Status Limitation G-Code CMS Severity Modifier  Intake 39% 61%  Predicted 54% 46% Goal Status+ CK - At least 40 percent but less than 60 percent  4/24/2018 43% 57%  5/9/2018 32% 68% Current Status CL - At least 60 percent but less than 80 percent  D/C Status CL **only report if this is discharge survey  +Based on FOTO predicted change score    Knee Right AROM Right Passive   Flexion 115 125   Extension -8 -3     Alan LOPEZ received individual therapeutic exercises to develop strength, endurance, ROM, flexibility, posture and core stabilization for 55 minutes including:    Nu step x 6 minutes   Seated LAQ 2x10x3#, 3 sec hold ea position       Heel prop x 3 minutes x 5# x 2 trials   PTA overpressure into knee extension 15 sec x 3   Quad set 3x10, 5 sec hold (towel under ankle)  SAQ 3x10x3# (small blue bolster) 3 sec hold in extension   SLR 3x10 3#  Supine hip adduction x 2 minutes (ball squeeze)            Supine heel slides (sliding board) x 3 minutes---progressing to AROM    +Gait training with wide base quad cane x 3 minutes  Standing heel raises 2x10  Standing hip abduction 2x10 (RTB)  +Mini  squats 2 x 10 (with ball)  Sit to stand 1x10   Gastroc stretch at wedge 30 sec x 3    NBOS 2 x 30''    SLS 2 x 15'' RLE  +Step ups (4 inch) 2x10     Alan LOPEZ received the following manual therapy techniques: scar and patellar mobilizations were applied to the: right knee for 0 minutes.    Written Home Exercises Provided: Reviewed HEP provided during initial evaluation.   Pt demo good understanding of the education provided. Alan LOPEZ demonstrated good return demonstration of activities.     Education provided re:Alan LOPEZ verbalized good understanding of education provided.   No spiritual or educational barriers to learning provided    Assessment     Rinku tolerated treatment well today. Pt demonstrated difficult to perform AROM right knee extension. Pt is able to gain almost full right knee extension with PROM.  Pt tolerated therapeutic exercises well. Pt demonstrated decrease AROM right knee flexion. V/c's required to apply over pressure during AAROM knee flexion. Pt required v/c's during wall squats to perform exercise with proper form. Pt tolerated well sit to stand with v/c's to stand up upright. FOTO knee survey demonstrated decrease since initial evaluation. Pt states he is feeling well. Patient completed exercises with no complaints of pain post treatment.  This is a 77 y.o. male referred to outpatient physical therapy and presents with a medical diagnosis of right TKA and demonstrates limitations as described in the problem list. Pt prognosis is Excellent. Pt will continue to benefit from skilled outpatient physical therapy to address the deficits listed in the problem list, provide pt/family education and to maximize pt's level of independence in the home and community environment.     Goals as follows:  GOALS: Short Term Goals:  4 weeks updated   1.Report decreased in pain at worse less than  <   / =  3  /10  to increase tolerance for functional mobility. (goal met)  2. Pt to improve right knee Active  range of motion to 115 degrees  or greater to allow for improved functional mobility to allow for improvement in IADLs.  (goal met)  3. Increased  Right knee MMT 1/2 grade to increase tolerance for ADL and work activities. To be performed next visit  4. Pt to tolerate HEP to improve ROM and independence with ADL's (goal met)  5. Pt will improve right knee extension to -5 or less to o allow for improved functional mobility to allow for improvement in IADLs. (goal met)     Long Term Goals: 6-8 weeks  1.Report decreased in pain at worse less than  <   / =  0 or 1/10  to increase tolerance for functional mobility.  2.Pt to improve  Right knee active  range of motion to 120 degrees or greater to allow for improved functional mobility to allow for improvement in IADLs.   3.Increased right knee  MMT 1 grade to increase tolerance for ADL and work activities.  4. Pt will report At least 40 percent but less than 60 percent on FOTO outcome assessment  to demonstrate increase in LE function with every day tasks.   5. Pt to be Independent with HEP to improve ROM and independence with ADL's  6.Pt will improve right knee extension to 0 or less to o allow for improved functional mobility to allow for improvement in IADLs.     Plan   Pt will return to therapy 1 or 2 weeks. Pt will be going out of town.     Continue with established Plan of Care towards PT goals.    Therapist: Quinn Thornton, PT  5/9/2018

## 2018-05-22 ENCOUNTER — CLINICAL SUPPORT (OUTPATIENT)
Dept: REHABILITATION | Facility: HOSPITAL | Age: 78
End: 2018-05-22
Payer: MEDICARE

## 2018-05-22 DIAGNOSIS — Z96.651 STATUS POST RIGHT KNEE REPLACEMENT: ICD-10-CM

## 2018-05-22 DIAGNOSIS — R29.898 WEAKNESS OF RIGHT LOWER EXTREMITY: ICD-10-CM

## 2018-05-22 PROCEDURE — 97110 THERAPEUTIC EXERCISES: CPT | Mod: PN

## 2018-05-22 NOTE — PROGRESS NOTES
Physical Therapy Daily Note     Name: Alan LOPEZ [facility] Number: [id number]  Diagnosis:   Encounter Diagnoses   Name Primary?    Status post right knee replacement     Weakness of right lower extremity      Physician: Nicholas Gonzalez MD  Precautions: R TKA  Visit #: 12 of 20   PN 4/27/2018  DOS:3/9/2018  PTA Visit #: 4    Time In: 12:00 pm  Time Out: 1:00 pm   Total Treatment Time: 60 minutes (1:1 with PT duration of 55'  treatment session)    Subjective     Pt reports: Rinku states his right knee was sore after last visit. Pt also states he has some knee pain. Pt states he will travel for vacation next week.     Pain Scale: Alan LOPEZ rates pain on a scale of 0-10 to be 0 currently.    Objective   CMS Impairment/Limitation/Restriction for FOTO Knee Survey  Status Limitation G-Code CMS Severity Modifier  Intake 39% 61%  Predicted 54% 46% Goal Status+ CK - At least 40 percent but less than 60 percent  4/24/2018 43% 57%  5/9/2018 32% 68% Current Status CL - At least 60 percent but less than 80 percent  D/C Status CL **only report if this is discharge survey  +Based on FOTO predicted change score    Knee Right AROM Right Passive   Flexion 115 120   Extension -5 -2     Alan LOPEZ received individual therapeutic exercises to develop strength, endurance, ROM, flexibility, posture and core stabilization for 55 minutes including:    Nu step x 6 minutes   Seated LAQ 2x10x3#, 3 sec hold ea position       Heel prop x 3 minutes x 5# x 2 trials   PTA overpressure into knee extension 15 sec x 3   Quad set 3x10, 5 sec hold (towel under ankle)  SAQ 3x10x3# (small blue bolster) 3 sec hold in extension   SLR 3x10 3#  Supine hip adduction x 2 minutes (ball squeeze)            Supine heel slides (sliding board) x 5minutes---progressing to AROM    +Gait training with wide base quad cane x 3 minutes  Standing heel raises 2x10  Standing hip abduction 2x10 (RTB)  Standing hip add  2x10 RTB  +Mini squats 2 x 10 (with ball)  Sit to stand 1x10   Gastroc stretch at wedge 30 sec x 5   Stairs hamstring stretch 3x30   Stair knee flexion stretch 2x10 hold 3 sec  NBOS 2 x 30''    SLS 2 x 15'' RLE  +Step ups (6 inch) 3x10     Alan LOPEZ received the following manual therapy techniques: scar and patellar mobilizations were applied to the: right knee for 0 minutes.    Written Home Exercises Provided: Reviewed HEP provided during initial evaluation.   Pt demo good understanding of the education provided. Alan LOPEZ demonstrated good return demonstration of activities.     Education provided re:Alan LOPEZ verbalized good understanding of education provided.   No spiritual or educational barriers to learning provided    Assessment     Rinku tolerated treatment well today. Pt presented to therapy after 1 week off due to vacation. Pt will be going out of town on June 1, 2018 again. Pt demonstrated decrease right knee flexion and extension range of motion. Pt is able to achieve 120 degrees during PROM of knee flexion. Pt cont limited right  knee extension. Pt demonstrated good tolerance to quadriceps muscle strength. Pt has hamstring and gastroc tightness. Pt will be going out of town June 1,2018. t states he is feeling well. Patient completed exercises with no complaints of pain post treatment.  This is a 77 y.o. male referred to outpatient physical therapy and presents with a medical diagnosis of right TKA and demonstrates limitations as described in the problem list. Pt prognosis is Excellent. Pt will continue to benefit from skilled outpatient physical therapy to address the deficits listed in the problem list, provide pt/family education and to maximize pt's level of independence in the home and community environment.     Goals as follows:  GOALS: Short Term Goals:  4 weeks updated   1.Report decreased in pain at worse less than  <   / =  3  /10  to increase tolerance for functional mobility. (goal met)  2.  Pt to improve right knee Active range of motion to 115 degrees  or greater to allow for improved functional mobility to allow for improvement in IADLs.  (goal met)  3. Increased  Right knee MMT 1/2 grade to increase tolerance for ADL and work activities. To be performed next visit  4. Pt to tolerate HEP to improve ROM and independence with ADL's (goal met)  5. Pt will improve right knee extension to -5 or less to o allow for improved functional mobility to allow for improvement in IADLs. (goal met)     Long Term Goals: 6-8 weeks  1.Report decreased in pain at worse less than  <   / =  0 or 1/10  to increase tolerance for functional mobility.  2.Pt to improve  Right knee active  range of motion to 120 degrees or greater to allow for improved functional mobility to allow for improvement in IADLs.   3.Increased right knee  MMT 1 grade to increase tolerance for ADL and work activities.  4. Pt will report At least 40 percent but less than 60 percent on FOTO outcome assessment  to demonstrate increase in LE function with every day tasks.   5. Pt to be Independent with HEP to improve ROM and independence with ADL's  6.Pt will improve right knee extension to 0 or less to o allow for improved functional mobility to allow for improvement in IADLs.     Plan     Continue with established Plan of Care towards PT goals.    Therapist: Quinn Thornton, PT  5/22/2018

## 2018-05-24 ENCOUNTER — CLINICAL SUPPORT (OUTPATIENT)
Dept: REHABILITATION | Facility: HOSPITAL | Age: 78
End: 2018-05-24
Payer: MEDICARE

## 2018-05-24 DIAGNOSIS — M25.561 CHRONIC PAIN OF RIGHT KNEE: ICD-10-CM

## 2018-05-24 DIAGNOSIS — Z74.09 IMPAIRED FUNCTIONAL MOBILITY, BALANCE, GAIT, AND ENDURANCE: ICD-10-CM

## 2018-05-24 DIAGNOSIS — M25.661 DECREASED RANGE OF MOTION OF RIGHT KNEE: ICD-10-CM

## 2018-05-24 DIAGNOSIS — Z96.651 STATUS POST RIGHT KNEE REPLACEMENT: Primary | ICD-10-CM

## 2018-05-24 DIAGNOSIS — G89.29 CHRONIC PAIN OF RIGHT KNEE: ICD-10-CM

## 2018-05-24 DIAGNOSIS — M62.81 MUSCLE WEAKNESS: ICD-10-CM

## 2018-05-24 DIAGNOSIS — R29.898 WEAKNESS OF RIGHT LOWER EXTREMITY: ICD-10-CM

## 2018-05-24 PROCEDURE — 97110 THERAPEUTIC EXERCISES: CPT | Mod: PN

## 2018-05-24 NOTE — PROGRESS NOTES
Physical Therapy Daily Note     Name: Alan LOPEZ [facility] Number: [id number]  Diagnosis:   Encounter Diagnoses   Name Primary?    Status post right knee replacement Yes    Weakness of right lower extremity     Chronic pain of right knee     Muscle weakness     Decreased range of motion of right knee     Impaired functional mobility, balance, gait, and endurance      Physician: Nicholas Gonzalez MD  Precautions: R TKA  Visit #: 13 of 20   PN 4/27/2018  DOS:3/9/2018  PTA Visit #: 1    Time In: 1108  Time Out: 1208  Total Treatment Time: 60 minutes (1:1 with PTA 30 minutes of treatment session)    Subjective     Pt reports: Rinku states his right knee is still really sore from 2 visits ago.     Pain Scale: Alan LOPEZ rates pain on a scale of 0-10 to be 2 currently.    Objective     Knee Right AROM Right Passive   Flexion 115 125   Extension -5 -2     Alan LOPEZ received individual therapeutic exercises to develop strength, endurance, ROM, flexibility, posture and core stabilization for 55 minutes including:    Nu step x 6 minutes   Seated LAQ 2x10x3#, 3 sec hold ea position       Heel prop x 3 minutes x 5# x 2 trials   PTA overpressure into knee extension 15 sec x 3   Quad set 3x10, 5 sec hold (towel under ankle)  SAQ 3x10x3# (small blue bolster) 3 sec hold in extension   SLR 2x10 3#  Supine hip adduction x 2 minutes (ball squeeze)            Supine heel slides (sliding board) x 5minutes---progressing to AROM    +Gait training with wide base quad cane x 3 minutes  Standing heel raises 3x10  Standing hip abduction 2x10 (RTB)  Standing hip add 2x10 RTB  Mini squats // bars 2x10   Sit to stand 1x10   Gastroc stretch at wedge 30 sec x 3  Stairs hamstring stretch 3x30   Stair knee flexion stretch 3x20 sec ea.  NBOS 2 x 30''    SLS 2 x 15'' RLE  Step ups (6 inch) 2x10     Alan LOPEZ received the following manual therapy techniques: scar and patellar mobilizations were  applied to the: right knee for 0 minutes.    Written Home Exercises Provided: Reviewed HEP provided during initial evaluation.   Pt demo good understanding of the education provided. Alan LOPEZ demonstrated good return demonstration of activities.     Education provided re:Alan LOPEZ verbalized good understanding of education provided.   No spiritual or educational barriers to learning provided    Assessment     Rinku tolerated treatment well today. Patient demonstrated appropriate training effect during standing activities; required one sitting rest break. Patient tolerated exercises very well and had no reports of increased knee pain post session. Patient continues to demonstrates forward flexed posture but demonstrates improving functional mobility and strength through RLE.   This is a 77 y.o. male referred to outpatient physical therapy and presents with a medical diagnosis of right TKA and demonstrates limitations as described in the problem list. Pt prognosis is Excellent. Pt will continue to benefit from skilled outpatient physical therapy to address the deficits listed in the problem list, provide pt/family education and to maximize pt's level of independence in the home and community environment.     Goals as follows:  GOALS: Short Term Goals:  4 weeks updated   1.Report decreased in pain at worse less than  <   / =  3  /10  to increase tolerance for functional mobility. (goal met)  2. Pt to improve right knee Active range of motion to 115 degrees  or greater to allow for improved functional mobility to allow for improvement in IADLs.  (goal met)  3. Increased  Right knee MMT 1/2 grade to increase tolerance for ADL and work activities. To be performed next visit  4. Pt to tolerate HEP to improve ROM and independence with ADL's (goal met)  5. Pt will improve right knee extension to -5 or less to o allow for improved functional mobility to allow for improvement in IADLs. (goal met)     Long Term Goals: 6-8  weeks  1.Report decreased in pain at worse less than  <   / =  0 or 1/10  to increase tolerance for functional mobility.  2.Pt to improve  Right knee active  range of motion to 120 degrees or greater to allow for improved functional mobility to allow for improvement in IADLs.   3.Increased right knee  MMT 1 grade to increase tolerance for ADL and work activities.  4. Pt will report At least 40 percent but less than 60 percent on FOTO outcome assessment  to demonstrate increase in LE function with every day tasks.   5. Pt to be Independent with HEP to improve ROM and independence with ADL's  6.Pt will improve right knee extension to 0 or less to o allow for improved functional mobility to allow for improvement in IADLs.     Plan     Continue with established Plan of Care towards PT goals.    Therapist: Diana Bauer, PTA  5/24/2018

## 2018-05-29 ENCOUNTER — CLINICAL SUPPORT (OUTPATIENT)
Dept: REHABILITATION | Facility: HOSPITAL | Age: 78
End: 2018-05-29
Payer: MEDICARE

## 2018-05-29 DIAGNOSIS — R29.898 WEAKNESS OF RIGHT LOWER EXTREMITY: ICD-10-CM

## 2018-05-29 DIAGNOSIS — Z96.651 STATUS POST RIGHT KNEE REPLACEMENT: ICD-10-CM

## 2018-05-29 PROCEDURE — 97110 THERAPEUTIC EXERCISES: CPT | Mod: PN

## 2018-05-29 NOTE — PROGRESS NOTES
Physical Therapy Daily Note     [id number]  Diagnosis:   Encounter Diagnoses   Name Primary?    Status post right knee replacement     Weakness of right lower extremity      Physician: Nicholas Gonzalez MD  Precautions: R TKA  Visit #: 14 of 20   PN 5/29//2018  DOS:3/9/2018  PTA Visit #: 1    Time In: 2:00 pm  Time Out: 3:00 pm  Total Treatment Time: 60 minutes (1:1 with PT 30 minutes of treatment session)    Subjective     Pt reports: Rinku states that he cont feel right knee soreness. Pt reports he is complaint with HEP.  Pt states he will be going out of town and will call to schedule PT appts. Pt states he has post surgical follow up soon.     Pain Scale: Alan LOPEZ rates pain on a scale of 0-10 to be 1 currently.    Objective   CMS Impairment/Limitation/Restriction for FOTO Knee Survey  Status Limitation G-Code CMS Severity Modifier  Intake 39% 61%  Predicted 54% 46% Goal Status+ CK - At least 40 percent but less than 60 percent  4/24/2018 43% 57%  5/9/2018 32% 68%  5/29/2018 49% 51% Current Status CK - At least 40 percent but less than 60 percent  D/C Status CK **only report if this is discharge survey  +Based on FOTO predicted change score  Knee Right AROM Right Passive/ AAROM   Flexion 116 125   Extension -3 -0      Lower Extremity Strength   Right LE   Left LE     Knee extension: 4/5 Knee extension: 4+/5   Knee flexion: 4/5 Knee flexion: 4+/5   Hip flexion: 4/5 Hip flexion: 4+/5   Hip extension:  NP Hip extension: 3/5   Hip abduction: 4/5 Hip abduction: 4/5   Hip adduction: 4/5 Hip adduction 3+/5   Ankle dorsiflexion: 4/5 Ankle dorsiflexion: 4+/5   Ankle plantarflexion: 5/5 Ankle plantarflexion: 5/5        Alan LOPEZ received individual therapeutic exercises to develop strength, endurance, ROM, flexibility, posture and core stabilization for 55 minutes including:    Nu step x 6 minutes   Seated LAQ 2x10x3#, 3 sec hold  ea position       Heel prop x 3 minutes x 5# x 2 trials   PTA overpressure into knee extension 15 sec x 3   Quad set 3x10, 5 sec hold (towel under ankle)  SAQ 3x10x3# (small blue bolster) 3 sec hold in extension   SLR 2x10 3#  Supine hip adduction x 2 minutes (ball squeeze)            Supine heel slides (sliding board) x 5minutes---progressing to AROM    +Gait training with wide base quad cane x 3 minutes  Standing heel raises 3x10  Standing hip abduction 2x10 (RTB)  Standing hip add 2x10 RTB  Mini squats // bars 2x10   Sit to stand 1x10   Gastroc stretch at wedge 30 sec x 3  Stairs hamstring stretch 3x30   Stair knee flexion stretch 3x20 sec ea.  NBOS 2 x 30''    SLS 2 x 15'' RLE  Step ups (6 inch) 2x10     Alan LOPEZ received the following manual therapy techniques: scar and patellar mobilizations were applied to the: right knee for 0 minutes.    Written Home Exercises Provided: Reviewed HEP provided during initial evaluation.   Pt demo good understanding of the education provided. Alan LOPEZ demonstrated good return demonstration of activities.     Education provided re:Alan LOPEZ verbalized good understanding of education provided.   No spiritual or educational barriers to learning provided    Assessment     Rinku tolerated treatment well today. Pt cont with right knee soreness. Pt demonstrated good tolerance to right quadriceps and hamstring muscle strengthening. Pt was re-assessed today. Pt presented with right flexion AROM of 116 degrees and right knee extension AROM of -3 degrees. PROM was demonstrated to be -0-0-125 degrees. Right lower extremity MMT increase to 4/5 in muscles tested. FOTO knee survey demonstrated increase since initial evaluation. Pt has met 4 out 6 LTGs today. Overall, pt has been progressing slowly in knee range of motion. Pt has been progression well in right lower extremity muscle strength. Cont skilled PT services to decrease functional limitations.   PT/PTA face to face conference  performed during today's treatment session. Patient's goals and POC updated today. .   This is a 77 y.o. male referred to outpatient physical therapy and presents with a medical diagnosis of right TKA and demonstrates limitations as described in the problem list. Pt prognosis is Excellent. Pt will continue to benefit from skilled outpatient physical therapy to address the deficits listed in the problem list, provide pt/family education and to maximize pt's level of independence in the home and community environment.     Goals as follows:  GOALS: Short Term Goals:  4 weeks updated   1.Report decreased in pain at worse less than  <   / =  3  /10  to increase tolerance for functional mobility. (goal met)  2. Pt to improve right knee Active range of motion to 115 degrees  or greater to allow for improved functional mobility to allow for improvement in IADLs.  (goal met)  3. Increased  Right knee MMT 1/2 grade to increase tolerance for ADL and work activities. To be performed next visit  4. Pt to tolerate HEP to improve ROM and independence with ADL's (goal met)  5. Pt will improve right knee extension to -5 or less to o allow for improved functional mobility to allow for improvement in IADLs. (goal met)     Long Term Goals: 6-8 weeks updated 5/29/2018  1.Report decreased in pain at worse less than  <   / =  0 or 1/10  to increase tolerance for functional mobility. (goal met)  2.Pt to improve  Right knee active  range of motion to 120 degrees or greater to allow for improved functional mobility to allow for improvement in IADLs.  (goal not met)  3.Increased right knee  MMT 1 grade to increase tolerance for ADL and work activities. (goal met)  4. Pt will report At least 40 percent but less than 60 percent on FOTO outcome assessment  to demonstrate increase in LE function with every day tasks. (goal met)  5. Pt to be Independent with HEP to improve ROM and independence with ADL's (goal met)  6.Pt will improve right knee  extension to 0 or less to o allow for improved functional mobility to allow for improvement in IADLs. (goal not met)     Plan   Pt will be going out of town. Pt will call to schedule appt.     Continue with established Plan of Care towards PT goals.    Therapist: Quinn Thornton, PT  5/29/2018

## 2018-06-20 ENCOUNTER — CLINICAL SUPPORT (OUTPATIENT)
Dept: REHABILITATION | Facility: HOSPITAL | Age: 78
End: 2018-06-20
Payer: MEDICARE

## 2018-06-20 DIAGNOSIS — R29.898 WEAKNESS OF RIGHT LOWER EXTREMITY: ICD-10-CM

## 2018-06-20 DIAGNOSIS — Z96.651 STATUS POST RIGHT KNEE REPLACEMENT: ICD-10-CM

## 2018-06-20 PROCEDURE — 97110 THERAPEUTIC EXERCISES: CPT | Mod: PN

## 2018-06-20 PROCEDURE — G8980 MOBILITY D/C STATUS: HCPCS | Mod: CJ,PN

## 2018-06-20 PROCEDURE — G8979 MOBILITY GOAL STATUS: HCPCS | Mod: CK,PN

## 2018-06-20 NOTE — PROGRESS NOTES
Physical Therapy Daily Note     Name: Alan LOPEZ [facility] Number: [id number]  Diagnosis:   Encounter Diagnoses   Name Primary?    Status post right knee replacement     Weakness of right lower extremity      Physician: Nicholas Gonzalez MD  Precautions: R TKA  Visit #: 15 of 20   PN 5/29//2018  DOS:3/9/2018  PTA Visit #: 1    Time In: 10:30 am  Time Out: 11:30 am   Total Treatment Time: 60 minutes (1:1 with PT 60 minutes of treatment session)    Subjective     Pt reports: Rinku states that he is feeling well. Pt states he just return from vacation. Pt states he did not have a lot walking.   Pain Scale: Alan LOPEZ rates pain on a scale of 0-10 to be 0 currently.    Objective   CMS Impairment/Limitation/Restriction for FOTO Knee Survey  Status Limitation G-Code CMS Severity Modifier  Intake 39% 61%  Predicted 54% 46% Goal Status+ CK - At least 40 percent but less than 60 percent  4/24/2018 43% 57%  5/9/2018 32% 68%  5/29/2018 49% 51%  6/20/2018 78% 22% Current Status CJ - At least 20 percent but less than 40 percent  D/C Status CJ **only report if this is discharge survey  +Based on FOTO predicted change score    Knee Right AROM Right Passive/ AAROM   Flexion 120 125   Extension -1 -0      Lower Extremity Strength   Right LE   Left LE     Knee extension: 4+/5 Knee extension: 4+/5   Knee flexion: 4+/5 Knee flexion: 4+/5   Hip flexion: 4+/5 Hip flexion: 4+/5   Hip extension:  NP Hip extension: 3/5   Hip abduction: 4+/5 Hip abduction: 4/5   Hip adduction: 4+/5 Hip adduction 3+/5   Ankle dorsiflexion: 4+/5 Ankle dorsiflexion: 4+/5   Ankle plantarflexion: 5/5 Ankle plantarflexion: 5/5        Alan LOPEZ received individual therapeutic exercises to develop strength, endurance, ROM, flexibility, posture and core stabilization for 55 minutes including:    Nu step x 6 minutes   Seated LAQ 2x10x3#, 3 sec hold ea position       Heel prop x 3 minutes x 5# x 2 trials   PTA  overpressure into knee extension 15 sec x 3   Quad set 3x10, 5 sec hold (towel under ankle)  SAQ 3x10x3# (small blue bolster) 3 sec hold in extension   SLR 2x10 3#  Supine hip adduction x 2 minutes (ball squeeze)            Supine heel slides (sliding board) x 5minutes---progressing to AROM    +Gait training with wide base quad cane x 3 minutes  Standing heel raises 3x10  Standing hip abduction 2x10 (RTB)  Standing hip add 2x10 RTB  Mini squats // bars 2x10   Sit to stand 1x10   Gastroc stretch at wedge 30 sec x 3  Stairs hamstring stretch 3x30   Stair knee flexion stretch 3x20 sec ea.  NBOS 2 x 30''    SLS 2 x 15'' RLE  Step ups (6 inch) 2x10   Side step 6 in 3x10  Machine knee extension 15 lbs 3x10   Machine knee flexion 25 lbs 3x10   Machine leg press 15 lbs 3x10       Alan LOPEZ received the following manual therapy techniques: scar and patellar mobilizations were applied to the: right knee for 0 minutes.    Written Home Exercises Provided: HEP was given prior discharge.   Pt demo good understanding of the education provided. Alan LOPEZ demonstrated good return demonstration of activities.     Education provided re:Alan LOPEZ verbalized good understanding of education provided.   No spiritual or educational barriers to learning provided    Assessment     Rinku tolerated treatment well today. Pt return to therapy after he went to vacation. Pt has not been to therapy since 5/29/2018. Pt states he is pain free in right knee, and he did a lot of walking during vacation. Pt was re-assessed today. Pt demonstrated improvement in AROM of right knee to 120. Pt still lacks right knee extension -1 degrees, but PROM able to reach 0 degrees.  Right knee MMT demonstrated improvement to 4+/5 in all planes. FOTO knee survey demonstrated only 22% impairment. Pt has met 5 out 6 LTGs today. Overall, pt progressed well towards goals. HEP was given today prior d/c. PT educated pt the importance to perform HEP. Pt understood.  Pt will  be discharge today.   .   This is a 77 y.o. male referred to outpatient physical therapy and presents with a medical diagnosis of right TKA and demonstrates limitations as described in the problem list. Pt prognosis is Excellent. Pt will continue to benefit from skilled outpatient physical therapy to address the deficits listed in the problem list, provide pt/family education and to maximize pt's level of independence in the home and community environment.     Goals as follows:  GOALS: Short Term Goals:  4 weeks updated   1.Report decreased in pain at worse less than  <   / =  3  /10  to increase tolerance for functional mobility. (goal met)  2. Pt to improve right knee Active range of motion to 115 degrees  or greater to allow for improved functional mobility to allow for improvement in IADLs.  (goal met)  3. Increased  Right knee MMT 1/2 grade to increase tolerance for ADL and work activities. To be performed next visit  4. Pt to tolerate HEP to improve ROM and independence with ADL's (goal met)  5. Pt will improve right knee extension to -5 or less to o allow for improved functional mobility to allow for improvement in IADLs. (goal met)     Long Term Goals: 6-8 weeks updated 5/29/2018  1.Report decreased in pain at worse less than  <   / =  0 or 1/10  to increase tolerance for functional mobility. (goal met)  2.Pt to improve  Right knee active  range of motion to 120 degrees or greater to allow for improved functional mobility to allow for improvement in IADLs.  (goal met)  3.Increased right knee  MMT 1 grade to increase tolerance for ADL and work activities. (goal met)  4. Pt will report At least 40 percent but less than 60 percent on FOTO outcome assessment  to demonstrate increase in LE function with every day tasks. (goal met)  5. Pt to be Independent with HEP to improve ROM and independence with ADL's (goal met)  6.Pt will improve right knee extension to 0 or less to o allow for improved functional  mobility to allow for improvement in IADLs. (goal not met)     Plan   Pt will be discharge today.     Therapist: Quinn Thornton, PT  6/20/2018

## 2018-07-16 DIAGNOSIS — M25.561 PAIN IN RIGHT KNEE: Primary | ICD-10-CM

## 2018-07-31 ENCOUNTER — OFFICE VISIT (OUTPATIENT)
Dept: CARDIOLOGY | Facility: CLINIC | Age: 78
End: 2018-07-31
Attending: INTERNAL MEDICINE
Payer: MEDICARE

## 2018-07-31 VITALS
SYSTOLIC BLOOD PRESSURE: 131 MMHG | BODY MASS INDEX: 25.46 KG/M2 | WEIGHT: 168 LBS | HEIGHT: 68 IN | DIASTOLIC BLOOD PRESSURE: 104 MMHG | HEART RATE: 78 BPM

## 2018-07-31 DIAGNOSIS — I48.21 PERMANENT ATRIAL FIBRILLATION: ICD-10-CM

## 2018-07-31 DIAGNOSIS — E78.00 HYPERCHOLESTEROLEMIA: ICD-10-CM

## 2018-07-31 DIAGNOSIS — M25.00 HEMARTHROSIS: ICD-10-CM

## 2018-07-31 DIAGNOSIS — I65.23 BILATERAL CAROTID ARTERY STENOSIS: ICD-10-CM

## 2018-07-31 DIAGNOSIS — I10 ESSENTIAL HYPERTENSION: ICD-10-CM

## 2018-07-31 DIAGNOSIS — I50.42 HEART FAILURE, SYSTOLIC AND DIASTOLIC, CHRONIC: ICD-10-CM

## 2018-07-31 DIAGNOSIS — Z86.73 HISTORY OF CEREBROVASCULAR ACCIDENT: ICD-10-CM

## 2018-07-31 DIAGNOSIS — Z79.01 CHRONIC ANTICOAGULATION: ICD-10-CM

## 2018-07-31 DIAGNOSIS — I73.9 PERIPHERAL ARTERY DISEASE: ICD-10-CM

## 2018-07-31 DIAGNOSIS — M54.9 BACK PAIN, UNSPECIFIED BACK LOCATION, UNSPECIFIED BACK PAIN LATERALITY, UNSPECIFIED CHRONICITY: ICD-10-CM

## 2018-07-31 PROCEDURE — 99214 OFFICE O/P EST MOD 30 MIN: CPT | Mod: S$GLB,,, | Performed by: INTERNAL MEDICINE

## 2018-07-31 PROCEDURE — 3075F SYST BP GE 130 - 139MM HG: CPT | Mod: CPTII,S$GLB,, | Performed by: INTERNAL MEDICINE

## 2018-07-31 PROCEDURE — 3080F DIAST BP >= 90 MM HG: CPT | Mod: CPTII,S$GLB,, | Performed by: INTERNAL MEDICINE

## 2018-07-31 PROCEDURE — 93000 ELECTROCARDIOGRAM COMPLETE: CPT | Mod: S$GLB,,, | Performed by: INTERNAL MEDICINE

## 2018-07-31 NOTE — PROGRESS NOTES
Subjective:     Alan Gutiérrez is a 77 y.o. male with hypertension and hypercholesterolemia. He has a healthy weight but used to be overweight. He has a history of a cerebrovascular accident in 2004 and then had right carotid endarterectomy. He was diagnosed with atrial fibrillation in 2010. He has CHADS2 score of 5 (CHAS2) and a [id number] score of 7 (DOI2Y6D) being on warfarin. He has well compensated systolic as well as diastolic heart failure. He underwent left CEA on 9/12/2014. He has received DRAKE injection which has helped his back pain. He has been bothered by swelling of especially the left ankle which has improved after he began wrapping the legs. He presented with severe pain in the right knee on 1/7/2018 and had hemarthrosis of the right knee in the setting of having had an elevated INR and a knee injection. He denies any chest pain or dyspnea. No palpitations or weak spells. No bleeding. Feeling fair overall.    Congestive Heart Failure   Presents for follow-up visit. Pertinent negatives include no abdominal pain, chest pain, chest pressure, claudication, edema, fatigue, muscle weakness, near-syncope, nocturia, orthopnea, palpitations, paroxysmal nocturnal dyspnea, shortness of breath or unexpected weight change. The symptoms have been stable.   Atrial Fibrillation   Presents for follow-up visit. Symptoms include hypertension. Symptoms are negative for bradycardia, chest pain, dizziness, hemodynamic instability, hypotension, palpitations, shortness of breath, syncope, tachycardia and weakness. The symptoms have been stable. Past medical history includes atrial fibrillation, CHF and hyperlipidemia.   Cerebrovascular Accident   The current episode started today. The problem has been unchanged. Pertinent negatives include no abdominal pain, anorexia, arthralgias, change in bowel habit, chest pain, chills, congestion, coughing, diaphoresis, fatigue, fever, headaches, joint swelling, myalgias, nausea,  neck pain, numbness, rash, sore throat, swollen glands, urinary symptoms, vertigo, visual change, vomiting or weakness.   Hypertension   This is a chronic problem. The current episode started today. The problem is unchanged. The problem is controlled (usually 130-140/70-85 mmHg at home). Pertinent negatives include no anxiety, blurred vision, chest pain, headaches, malaise/fatigue, neck pain, orthopnea, palpitations, peripheral edema, PND, shortness of breath or sweats. There is no history of chronic renal disease.   Hyperlipidemia   This is a chronic problem. The current episode started more than 1 year ago. The problem is controlled. Recent lipid tests were reviewed and are normal. He has no history of chronic renal disease, diabetes, hypothyroidism, liver disease, obesity or nephrotic syndrome. Pertinent negatives include no chest pain, focal sensory loss, focal weakness, leg pain, myalgias or shortness of breath.       Review of Systems   Constitution: Negative for chills, diaphoresis, fatigue, fever, weakness, malaise/fatigue and unexpected weight change.   HENT: Negative for congestion and sore throat.    Eyes: Negative for blurred vision, vision loss in left eye and vision loss in right eye.   Cardiovascular: Negative for chest pain, claudication, dyspnea on exertion, irregular heartbeat, near-syncope, orthopnea, palpitations, paroxysmal nocturnal dyspnea and syncope.   Respiratory: Negative for cough, shortness of breath and wheezing.    Endocrine: Negative for cold intolerance.   Hematologic/Lymphatic: Negative for bleeding problem. Does not bruise/bleed easily.   Skin: Negative for rash.   Musculoskeletal: Positive for joint pain (right knee). Negative for arthralgias, falls, gout, joint swelling, muscle weakness, myalgias and neck pain.   Gastrointestinal: Negative for abdominal pain, anorexia, change in bowel habit, hematochezia, hemorrhoids, jaundice, melena, nausea and vomiting.   Genitourinary:  "Negative for frequency, hematuria and nocturia.   Neurological: Negative for dizziness, focal weakness, headaches, light-headedness, loss of balance, numbness and vertigo.   Psychiatric/Behavioral: Negative for altered mental status, depression and memory loss. The patient is not nervous/anxious.    Allergic/Immunologic: Negative for hives and persistent infections.       Current Outpatient Prescriptions on File Prior to Visit   Medication Sig Dispense Refill    allopurinol (ZYLOPRIM) 100 MG tablet Take 100 mg by mouth once daily.      apixaban 5 mg Tab Take 1 tablet (5 mg total) by mouth 2 (two) times daily. 180 tablet 3    calcium citrate (CALCITRATE) 200 mg (950 mg) tablet Take 1 tablet by mouth once daily.      cloNIDine (CATAPRES) 0.1 MG tablet take one tablet by mouth every 8 hours at 7 am, 1 pm, and 9 pm  1    digoxin (LANOXIN) 125 mcg tablet TAKE 1 TABLET ONE TIME DAILY 90 tablet 3    finasteride (PROSCAR) 5 mg tablet Take 5 mg by mouth once daily.      furosemide (LASIX) 40 MG tablet Take 1 tablet (40 mg total) by mouth once daily. 30 tablet 11    losartan (COZAAR) 100 MG tablet Take 100 mg by mouth once daily.      metoprolol succinate (TOPROL-XL) 50 MG 24 hr tablet Take 1 tablet (50 mg total) by mouth once daily. 90 tablet 3    NIFEdipine (PROCARDIA-XL) 30 MG (OSM) 24 hr tablet Take 30 mg by mouth every morning.      NIFEdipine (PROCARDIA-XL) 60 MG (OSM) 24 hr tablet Take 60 mg by mouth every evening.      oxyCODONE-acetaminophen (PERCOCET) 5-325 mg per tablet 1 tab every 4 hours PRN pain or 2 tablets every 6 hours PRN pain 90 tablet 0    potassium chloride (MICRO-K) 10 MEQ CpSR Take 10 mEq by mouth once daily.       rosuvastatin (CRESTOR) 40 MG Tab Take 40 mg by mouth once daily.      vitamin D 1000 units Tab Take 185 mg by mouth once daily.       No current facility-administered medications on file prior to visit.        BP (!) 131/104   Pulse 78   Ht 5' 8" (1.727 m)   Wt 76.2 kg (168 " lb)   BMI 25.54 kg/m²       Objective:     Physical Exam   Constitutional: He is oriented to person, place, and time. He appears well-developed and well-nourished. He does not appear ill. No distress.   HENT:   Head: Normocephalic and atraumatic.   Nose: Nose normal.   Eyes: Right eye exhibits no discharge. Left eye exhibits no discharge. Right conjunctiva is not injected. Left conjunctiva is not injected. Right pupil is round. Left pupil is round. Pupils are equal.   Neck: Neck supple. No JVD present. Carotid bruit is not present. No thyromegaly present.   Cardiovascular: Normal rate, S1 normal and S2 normal.  An irregularly irregular rhythm present.  No extrasystoles are present. PMI is not displaced.  Exam reveals no gallop.    Murmur heard.  High-pitched blowing holosystolic murmur is present with a grade of 2/6  at the apex  Pulses:       Radial pulses are 2+ on the right side, and 2+ on the left side.        Femoral pulses are 2+ on the right side, and 2+ on the left side.       Dorsalis pedis pulses are 2+ on the right side, and 1+ on the left side.        Posterior tibial pulses are 2+ on the right side, and 0 on the left side.   Pulmonary/Chest: Effort normal and breath sounds normal.   Abdominal: Soft. Normal appearance. There is no hepatosplenomegaly. There is no tenderness.   Musculoskeletal:        Right ankle: He exhibits swelling. He exhibits no ecchymosis and no deformity.        Left ankle: He exhibits swelling. He exhibits no ecchymosis and no deformity.   Lymphadenopathy:        Head (right side): No submandibular adenopathy present.        Head (left side): No submandibular adenopathy present.     He has no cervical adenopathy.   Neurological: He is alert and oriented to person, place, and time. He is not disoriented. No cranial nerve deficit or sensory deficit.   Skin: Skin is warm, dry and intact. No rash noted. He is not diaphoretic. Nails show no clubbing.   Psychiatric: He has a normal mood  and affect. His speech is normal and behavior is normal. Judgment and thought content normal. Cognition and memory are normal.       Assessment:     1. Heart failure, systolic and diastolic, chronic    2. Permanent atrial fibrillation    3. Chronic anticoagulation    4. Bilateral carotid artery stenosis    5. History of cerebrovascular accident    6. Peripheral artery disease    7. Essential hypertension    8. Hypercholesterolemia    9. Hemarthrosis    10. Back pain, unspecified back location, unspecified back pain laterality, unspecified chronicity        Plan:     1. Heart Failure, Systolic & Diastolic, Chronic              8/1/2016: Echo: Normal left ventricular size with mild systolic dysfunction. EF 45%. Markedly dilated LA. Moderate aortic valve sclerosis - 1.6 m/s.              1/8/2018: Normal left ventricular size with low normal systolic dysfunction. EF 50-55%. Anteroseptal wall mildly hypokinetic. Mild LVH. Markedly dilated LA. Moderate aortic valve sclerosis - 1.5 m/s.    Off ramipril 10 mg Q24 due to dry cough.              On losartan 100 mg Q24, metoprolol 50 mg Q24 and furosemide 40 mg Q24.              Previously seen edema of ankles well controlled with pressure stockings.              Appears reasonably well compensated.          2. Atrial Fibrillation              2010: Diagnosed with permanent atrial fibrillation.              CHADS2=4 (CHAS2). OSH1AT3VICp=6 (EZW1B2H)              On warfarin.              On metoprolol 25 mg Q24 and digoxin 0.25 mg Q24.   1/15/2018:  bpm.              Rate appears high.   Increase metoprolol to 50 mg Q24.   1/23/2018: Holter: Atrial fibrillation 81 () bpm. Briefly in 30s at night. Longest RR 2.1 sec.   On metoprolol 50 mg Q24 and digoxin 0.125 mg Q24.    Rate well controlled.     3. Chronic Anticoagulation              2010: Diagnosed with permanent atrial fibrillation.              CHADS2=4 (CHAS2). [id number]=4 (UFT8Y3A).              Used to be  on warfarin. Anticoagulation managed by Dr. Pressley.   1/13/2018: Began apixiban.   On apixiban 5 mg Q12.              No aspirin or NSAID.   No bleeding.                 4. Carotid Artery Stenosis              2004: Right CEA.              8/26/2014: Carotid Duplex: DIANNA: Mild. LICA: Critical - 3.4 m/s.              [place] [facility] [place] DIANNA: Mild. Left Vert: 90%. LICA: 95%.              9/2014: Left CEA.              12/1/2016: Carotid Duplex: Moderate plaquing.              12/13/2017: Carotid Duplex: Moderate plaquing.              No need for aspirin.              12/2018: Plan next Carotid Duplex. Then yearly.     5. History of Cerebrovascular Accident              5/2004: CVA. Left sided hemiplegia. Received tPA. Good recovery.              7/2004: Left temporal CVA. Weak.     6. Peripheral Artery Disease              10/25/2016: Arterial Duplex: Right: SFA: Distal 1.8 m/s. Left: No obstructive disease above knee.              Asymptomatic.     7. Hypertension              1990: Diagnosed.               On losartan 100 mg Q24, metoprolol 50 mg Q24, clonidine 0.1 mg Q8, nifedipine 30 mg & 60 mg Q12 and furosemide 40 mg Q24.              Keeping log at home              Well controlled overall.     8. Hypercholesterolemia              2004: Began statin.              On rosuvastatin 40 mg Q24.              Tells me well controlled.     9. History of Knee Replacement   1/5/2018: Right knee pain became severe.   3/9/2018: Right knee replacement.              Appears to have had hemarthrosis.   Dr. Nicholas Gonzalez.    10. Hyperuricemia   2008: Diagnosed.   On allopurinol 100 mg q24    11. Primary Care   Dr. Dave Luna Jr..    F/u 4 months.    Zachary Pressley M.D.

## 2018-08-01 ENCOUNTER — CLINICAL SUPPORT (OUTPATIENT)
Dept: REHABILITATION | Facility: HOSPITAL | Age: 78
End: 2018-08-01
Payer: MEDICARE

## 2018-08-01 DIAGNOSIS — R29.898 WEAKNESS OF RIGHT LOWER EXTREMITY: ICD-10-CM

## 2018-08-01 DIAGNOSIS — M62.81 MUSCLE WEAKNESS: Primary | ICD-10-CM

## 2018-08-01 DIAGNOSIS — R26.2 DIFFICULTY WALKING: ICD-10-CM

## 2018-08-01 PROCEDURE — 97140 MANUAL THERAPY 1/> REGIONS: CPT | Mod: PN

## 2018-08-01 PROCEDURE — G8979 MOBILITY GOAL STATUS: HCPCS | Mod: CJ,PN

## 2018-08-01 PROCEDURE — 97161 PT EVAL LOW COMPLEX 20 MIN: CPT | Mod: PN

## 2018-08-01 PROCEDURE — G8978 MOBILITY CURRENT STATUS: HCPCS | Mod: CK,PN

## 2018-08-01 PROCEDURE — 97110 THERAPEUTIC EXERCISES: CPT | Mod: PN

## 2018-08-01 NOTE — PLAN OF CARE
OUTPATIENT PHYSICAL THERAPY   PATIENT EVALUATION  Onset Date: s/p TKA 3/9/18  Primary Diagnosis:   1. Muscle weakness     2. Weakness of right lower extremity     3. Difficulty walking       Treatment Diagnosis:   Past Medical History:   Diagnosis Date    A-fib     Cancer     skin    Carotid artery stenosis 8/28/2014 8/26/2014: Carotid duplex: DIANNA: mild. LICA: critical - 3.4 m/s.    CHF (congestive heart failure)     Chronic anticoagulation 8/28/2014    Heart failure, systolic and diastolic, chronic 11/17/2016 8/1/2016: Echo: Normal left ventricular size with mild systolic dysfunction. EF 45%. Markedly dilated LA. Moderate aortic valve sclerosis - 1.6 m/s.    Herniated disc     Hypercholesterolemia 11/17/2016 2004: Began statin.    Hypertension     Hypertension 11/17/2016 1990: Diagnosed.    Lipids abnormal     OA (osteoarthritis)     Osteopenia     Peripheral artery disease 12/1/2016    10/25/2016: Arterial Duplex: Right: SFA: Distal 1.8 m/s. Left: No obstructive disease above knee.    Stroke     2004     Precautions: fall risk  Prior Therapy: several visits for right TKA  Medications: Alan Gutiérrez has a current medication list which includes the following prescription(s): allopurinol, apixaban, calcium citrate, clonidine, digoxin, finasteride, furosemide, losartan, metoprolol succinate, nifedipine, nifedipine, oxycodone-acetaminophen, potassium chloride, rosuvastatin, and vitamin d.  Nutrition:  Normal    Prior Level of Function: Assistive Device  Social History: lives with wife  Place of Residence (Steps/Adaptations): [facility]      Subjective     Alan Gutiérrez is a 77 year old male presenting with c/o right knee pain and imbalance.  He is s/p right TKA 3/9/18. He underwent several visits of physical therapy prior to today's visit.  He does report a recent history of falls x 2  3 months ago due to loss of balance.  He states he ambulates with a single point cane in his home. He  "states he uses a FWW in the community.  He states he has used a walker for 8 years mostly due to history of back problems. He denies lumbar surgery.  His goal is to "get his leg straight and work on balance". He would also like to walk and stand as needed without difficulty.     Pain:  Location: right knee pain    Activities Which Increase Pain: exercise  Activities Which Decrease Pain: rest  Pain Scale: 0/10 at best 0/10 now  2/10 at worst    Objective     Observation:  Pt presents ambulating with a FWW, crouched posture, decreased step length and velocity. No sign of swelling globally.  Fair VMO response.     Range of Motion/Strength:   Knee  Right    LEFT       AROM  PROM  MMT  AROM  PROM  MMT    Flexion  113 NT 4/5  128 NT 4/5    Extension  -6 NT 4/5  0 NT 4/5      AROM: Right LE: WFL Left LE: WFL    Hamstring length: Right: poor, Left: poor    Static standing balance: poor+    TU seconds with FWW        Mobility: Patella mobilization  P/A glide of tibiofemoral joint    Bed Mobility: modified independent  Transfers: modified independent    Treatment:   Pt received therapeutic exercises to develop strength, endurance, ROM, flexibility, posture and core stabilization for 15 minutes including:    -quad set: 2 x 10  -heel prop. 2 min x 3  -mini squats 2 x 10  -standing double leg balance NBOS with HT x 5 min    Pt received manual therapy to improve mobility for 10 minutes:  -patellar mobilization, posterior femorotibial glides grade 3  -PROM      Pt was instructed in and given a home exercise program consisting of the above activities.   Assessment      Pt presents with signs and symptoms consistent with referring diagnosis. Evaluation has determined a decrease in functional status and subjective and objective deficits that can be addressed by physical therapy intervention. Pt demonstrates pain limiting functional activities. Decreased flexibility and strength limiting normal movement patterns. Decreased " segmental motion. Decreased postural strength and awareness. Positive special testing. Decreased participation in functional and recreational activities. Subjective and objective measures are addressed by goals in the plan of care. Patient/family are involved in the development of these goals. Patient/family are educated about current injury and treatment. Pt demonstrates no additional cultural, spiritual or educational need and currently has no barriers to learning.      Pt responded well to treatment today. Pt is a good candidate for skilled physical therapy intervention and has a good prognosis and is motivated to return to functional an recreational activities.     Rehab Potential: good    History  Co-morbidities and personal factors that may impact the plan of care Examination  Body Structures and Functions, activity limitations and participation restrictions that may impact the plan of care Clinical Presentation   Decision Making/ Complexity Score   Co-morbidities:   A-fib   Cancer   Carotid artery stenosis   CHF    Chronic anticoagulation   Heart failure, systolic and diastolic, chronic   Herniated disc   Hypercholesterolemia   Hypertension   Lipids abnormal   OA (osteoarthritis)   Osteopenia   Peripheral artery disease   Stroke       Personal Factors:   Travel schedule Body Regions: right knee    Body Systems: musculoskeletal          Activity limitations: squatting, prolonged standing, walking    Participation Restrictions:  Exercise, shopping  Traveling   stable   Low         Short Term Goals (4 Weeks):     1.Pt to increase strength by a 1/2 grade of muscles test to allow for improvement in functional activities such as performing chores.  2.Pt to improve range of motion by 25% to allow for improved functional mobility to allow for improvement in IADLs.   3.Pt to report compliance with HEP and demonstrate proper exercise technique to PT to show competence with self management of condition.  4.Decrease  pain by 25% during functional activities.  5. Improved TUG time to 14 seconds with FWW    Long Term Goals (12 Weeks):     1. Increase ROM to allow improved joint biomechanics during functional activities.   2.Increase trunk and lower extremity strength to within normal limits during functional activities.   3. Independent with home exercise program.   4. Full return to functional activities with manageable complaints.  5. Patient to demonstrate improved posture and body mechanics.  6. Decrease pain by 75% during functional activities.   7.  Improved TUG time to 11 seconds with FWW    CMS Impairment/Limitation/Restriction for FOTO Knee Survey  Status Limitation G-Code CMS Severity Modifier  Intake 60% 40% Current Status CK - At least 40 percent but less than 60 percent  Predicted 64% 36% Goal Status+ CJ - At least 20 percent but less than 40 percent    Plan      Certification Period: 8/1/18 to 11/1/18    Recommended Treatment Plan: 2-3 times per week for 12 weeks with treatments to consist of:  Neuromuscular and postural re-education,  training, therapeutic exercise, therapeutic activities,balance training, manual therapy, soft tissue mobilization, ROM exercises, Cardiovascular, Postural stabilization, manual traction, spinal mobilization, moist heat, cryotherapy, electrical stimulation, ultrasound, home exercise education and planning.    Therapist: Danis Charles, PT

## 2018-08-01 NOTE — PROGRESS NOTES
TIME RECORD    Date: 08/01/2018    Start Time:  2:00  Stop Time:  3:00      Total Timed Minutes:  60 minutes      OUTPATIENT PHYSICAL THERAPY   PATIENT EVALUATION  Onset Date: s/p TKA 3/9/18  Primary Diagnosis:   1. Muscle weakness     2. Weakness of right lower extremity     3. Difficulty walking       Treatment Diagnosis:   Past Medical History:   Diagnosis Date    A-fib     Cancer     skin    Carotid artery stenosis 8/28/2014 8/26/2014: Carotid duplex: DIANNA: mild. LICA: critical - 3.4 m/s.    CHF (congestive heart failure)     Chronic anticoagulation 8/28/2014    Heart failure, systolic and diastolic, chronic 11/17/2016 8/1/2016: Echo: Normal left ventricular size with mild systolic dysfunction. EF 45%. Markedly dilated LA. Moderate aortic valve sclerosis - 1.6 m/s.    Herniated disc     Hypercholesterolemia 11/17/2016 2004: Began statin.    Hypertension     Hypertension 11/17/2016 1990: Diagnosed.    Lipids abnormal     OA (osteoarthritis)     Osteopenia     Peripheral artery disease 12/1/2016    10/25/2016: Arterial Duplex: Right: SFA: Distal 1.8 m/s. Left: No obstructive disease above knee.    Stroke     2004     Precautions: fall risk  Prior Therapy: several visits for right TKA  Medications: Alan Gutiérrez has a current medication list which includes the following prescription(s): allopurinol, apixaban, calcium citrate, clonidine, digoxin, finasteride, furosemide, losartan, metoprolol succinate, nifedipine, nifedipine, oxycodone-acetaminophen, potassium chloride, rosuvastatin, and vitamin d.  Nutrition:  Normal    Prior Level of Function: Assistive Device  Social History: lives with wife  Place of Residence (Steps/Adaptations): [facility]      Subjective     Alan Gutiérrez is a 77 year old male presenting with c/o right knee pain and imbalance.  He is s/p right TKA 3/9/18. He underwent several visits of physical therapy prior to today's visit.  He does report a recent history of  "falls x 2  3 months ago due to loss of balance.  He states he ambulates with a single point cane in his home. He states he uses a FWW in the community.  He states he has used a walker for 8 years mostly due to history of back problems. He denies lumbar surgery.  His goal is to "get his leg straight and work on balance". He would also like to walk and stand as needed without difficulty.     Pain:  Location: right knee pain    Activities Which Increase Pain: exercise  Activities Which Decrease Pain: rest  Pain Scale: 0/10 at best 0/10 now  2/10 at worst    Objective     Observation:  Pt presents ambulating with a FWW, crouched posture, decreased step length and velocity. No sign of swelling globally.  Fair VMO response.     Range of Motion/Strength:   Knee  Right    LEFT       AROM  PROM  MMT  AROM  PROM  MMT    Flexion  113 NT 4/5  128 NT 4/5    Extension  -6 NT 4/5  0 NT 4/5      AROM: Right LE: WFL Left LE: WFL    Hamstring length: Right: poor, Left: poor    Static standing balance: poor+    TU seconds with FWW        Mobility: Patella mobilization  P/A glide of tibiofemoral joint    Bed Mobility: modified independent  Transfers: modified independent    Treatment:   Pt received therapeutic exercises to develop strength, endurance, ROM, flexibility, posture and core stabilization for 15 minutes including:    -quad set: 2 x 10  -heel prop. 2 min x 3  -mini squats 2 x 10  -standing double leg balance NBOS with HT x 5 min    Pt received manual therapy to improve mobility for 10 minutes:  -patellar mobilization, posterior femorotibial glides grade 3  -PROM      Pt was instructed in and given a home exercise program consisting of the above activities.   Assessment      Pt presents with signs and symptoms consistent with referring diagnosis. Evaluation has determined a decrease in functional status and subjective and objective deficits that can be addressed by physical therapy intervention. Pt demonstrates pain " limiting functional activities. Decreased flexibility and strength limiting normal movement patterns. Decreased segmental motion. Decreased postural strength and awareness. Positive special testing. Decreased participation in functional and recreational activities. Subjective and objective measures are addressed by goals in the plan of care. Patient/family are involved in the development of these goals. Patient/family are educated about current injury and treatment. Pt demonstrates no additional cultural, spiritual or educational need and currently has no barriers to learning.      Pt responded well to treatment today. Pt is a good candidate for skilled physical therapy intervention and has a good prognosis and is motivated to return to functional an recreational activities.     Rehab Potential: good    History  Co-morbidities and personal factors that may impact the plan of care Examination  Body Structures and Functions, activity limitations and participation restrictions that may impact the plan of care Clinical Presentation   Decision Making/ Complexity Score   Co-morbidities:   A-fib   Cancer   Carotid artery stenosis   CHF    Chronic anticoagulation   Heart failure, systolic and diastolic, chronic   Herniated disc   Hypercholesterolemia   Hypertension   Lipids abnormal   OA (osteoarthritis)   Osteopenia   Peripheral artery disease   Stroke       Personal Factors:   Travel schedule Body Regions: right knee    Body Systems: musculoskeletal          Activity limitations: squatting, prolonged standing, walking    Participation Restrictions:  Exercise, shopping  Traveling   stable   Low         Short Term Goals (4 Weeks):     1.Pt to increase strength by a 1/2 grade of muscles test to allow for improvement in functional activities such as performing chores.  2.Pt to improve range of motion by 25% to allow for improved functional mobility to allow for improvement in IADLs.   3.Pt to report compliance with HEP and  demonstrate proper exercise technique to PT to show competence with self management of condition.  4.Decrease pain by 25% during functional activities.  5. Improved TUG time to 14 seconds with FWW    Long Term Goals (12 Weeks):     1. Increase ROM to allow improved joint biomechanics during functional activities.   2.Increase trunk and lower extremity strength to within normal limits during functional activities.   3. Independent with home exercise program.   4. Full return to functional activities with manageable complaints.  5. Patient to demonstrate improved posture and body mechanics.  6. Decrease pain by 75% during functional activities.   7.  Improved TUG time to 11 seconds with FWW    CMS Impairment/Limitation/Restriction for FOTO Knee Survey  Status Limitation G-Code CMS Severity Modifier  Intake 60% 40% Current Status CK - At least 40 percent but less than 60 percent  Predicted 64% 36% Goal Status+ CJ - At least 20 percent but less than 40 percent    Plan      Certification Period: 8/1/18 to 11/1/18    Recommended Treatment Plan: 2-3 times per week for 12 weeks with treatments to consist of:  Neuromuscular and postural re-education,  training, therapeutic exercise, therapeutic activities,balance training, manual therapy, soft tissue mobilization, ROM exercises, Cardiovascular, Postural stabilization, manual traction, spinal mobilization, moist heat, cryotherapy, electrical stimulation, ultrasound, home exercise education and planning.    Therapist: Danis Charles, PT

## 2018-08-21 ENCOUNTER — CLINICAL SUPPORT (OUTPATIENT)
Dept: REHABILITATION | Facility: HOSPITAL | Age: 78
End: 2018-08-21
Payer: MEDICARE

## 2018-08-21 DIAGNOSIS — R26.2 DIFFICULTY WALKING: ICD-10-CM

## 2018-08-21 PROCEDURE — 97110 THERAPEUTIC EXERCISES: CPT | Mod: PN

## 2018-08-21 NOTE — PROGRESS NOTES
"                                                    Physical Therapy Daily Note     Name: Alan LOPEZ [facility] Number: [id number]  Diagnosis:   Encounter Diagnosis   Name Primary?    Difficulty walking      Physician: Zohreh Coleman NP  Precautions: Fall risk  Visit #: 2 of 20  HUERTAS: 12/31/2018  PTA Visit #: 1  Time In: 1040  Time Out: 1143  Total Treatment Time: 53 mins (1:1 with PTA for 25 mins)    Subjective     Patient reports: no new complaints today. Patient states that he would like to work on strengthening his leg and his balance.   Pain Scale: Alan LOPEZ rates pain on a scale of 0-10 to be 0 currently.    Objective     Alan LOPEZ received individual therapeutic exercises to develop strength, endurance, ROM, flexibility, posture and core stabilization for 53 minutes including:    Nustep: 7 minutes - Level 1.5    Quad sets: 20 x 5 sec hold - RLE  Heel prop: 2 minutes x 3 trials  SLR: 2 x 10 x 3#  SAQ: 3 x 10 x 3# x 3 sec hold - small blue bolster    Mini squats: 2 x 10  Standing double leg balance NBOS with HT x 5 minutes  Standing heel raises: 3 x 10  Standing hip abduction: Red TB x 2 x 10  Step ups on 6" step: 2 x 10    Alan LOPEZ received the following manual therapy techniques:   00 min x Patellar mobilization, posterior femorotibial glides grade 3; PROM    The patient received the following direct contact modalities after being cleared for contraindications: none    The patient received the following supervised modalities after being cleared for contradictions: cold pack to Right knee x 10 minutes post treatment.    Written Home Exercises Provided: Reviewed from initial evaluation. See EMR for printout provided.  Pt demo good understanding of the education provided. Alan LOPEZ demonstrated good return demonstration of activities.     Education provided re:   Alan LOPEZ verbalized good understanding of education provided.   No spiritual or educational barriers to learning provided    Assessment "     Patient tolerated treatment session well today. Good tolerance to exercises performed reporting no provocation of Right knee pain. Patient demonstrated some postural sway with standing balance activities and will benefit from progression of balance exercises next visit.  This is a 77 y.o. male referred to outpatient physical therapy and presents with a medical diagnosis of muscle weakness of Right lower extremity, difficulty walking and demonstrates limitations as described in the problem list. Pt prognosis is Good. Pt will continue to benefit from skilled outpatient physical therapy to address the deficits listed in the problem list, provide pt/family education and to maximize pt's level of independence in the home and community environment.     Goals as follows:  Short Term Goals (4 Weeks):   1.Pt to increase strength by a 1/2 grade of muscles test to allow for improvement in functional activities such as performing chores.  2.Pt to improve range of motion by 25% to allow for improved functional mobility to allow for improvement in IADLs.   3.Pt to report compliance with HEP and demonstrate proper exercise technique to PT to show competence with self management of condition.  4.Decrease pain by 25% during functional activities.  5. Improved TUG time to 14 seconds with FWW     Plan     Certification Period: 8/1/18 to 11/1/18 (PN due by 9/1/18)    Continue with established Plan of Care towards PT goals.    Therapist: Michelle Ray, PTA  8/21/2018

## 2018-08-24 ENCOUNTER — CLINICAL SUPPORT (OUTPATIENT)
Dept: REHABILITATION | Facility: HOSPITAL | Age: 78
End: 2018-08-24
Payer: MEDICARE

## 2018-08-24 DIAGNOSIS — R29.898 WEAKNESS OF RIGHT LOWER EXTREMITY: ICD-10-CM

## 2018-08-24 DIAGNOSIS — M62.81 MUSCLE WEAKNESS: ICD-10-CM

## 2018-08-24 DIAGNOSIS — R26.2 DIFFICULTY WALKING: Primary | ICD-10-CM

## 2018-08-24 PROCEDURE — 97110 THERAPEUTIC EXERCISES: CPT | Mod: PN

## 2018-08-24 NOTE — PROGRESS NOTES
"                                                    Physical Therapy Daily Note     Name: Alan LOPEZ [facility] Number: [id number]  Diagnosis:   Encounter Diagnoses   Name Primary?    Difficulty walking Yes    Muscle weakness     Weakness of right lower extremity      Physician: Zohreh Coleman NP  Precautions: Fall risk  Visit #: 3 of 20  HUERTAS: 12/31/2018    Time In: 10:30  Time Out: 11:30  Total Treatment Time: 60 mins (1:1 with PT for 30 minutes)    Subjective     Patient reports: no new complaints today. Reports fair compliance with home program.   Pain Scale: Alan LOPEZ rates pain on a scale of 0-10 to be 0 currently.    Objective     Alan LOPEZ received individual therapeutic exercises to develop strength, endurance, ROM, flexibility, posture and core stabilization for 55 minutes including:    Nustep: 8 minutes -   Quad sets: 20 x 5 sec hold - RLE  Heel prop: 2 minutes x 3 trials  SLR: 2 x 10 x 2#  SAQ: 3 x 10 x 3# x 3 sec hold - small blue bolster  -sidelying hip abd 2 x 10  Mini squats: 2 x 10  Standing double leg balance NBOS with HT x 5 minutes  Standing heel raises: 3 x 10  Standing hip abduction: Red TB x 2 x 10  Step ups on 6" step: 2 x 10  Modified single leg balance with 4 in block with HT    Alan LOPEZ received the following manual therapy techniques:   00 min x Patellar mobilization, posterior femorotibial glides grade 3; PROM    The patient received the following direct contact modalities after being cleared for contraindications: none    The patient received the following supervised modalities after being cleared for contradictions: cold pack to Right knee x 10 minutes post treatment.    Written Home Exercises Provided: Reviewed.       Assessment     No c/o increased discomfort with prescribed activities.  Able to increase difficulty of balance activities to CGA 15%. Pt demonstrates a good understanding of the education provided and a good return demonstration of activities. Pt  Requires " skilled supervision to complete and progress home program.    Medical necessity is demonstrated by the following IMPAIRMENTS:  -pain   -decreased range of motion/flexibility   -decreased muscle strength   -impaired function    -decreased ADL ability  -decreased recreational ability     Patient is making good progress towards established goals.    Goals as follows:  Short Term Goals (4 Weeks):   1.Pt to increase strength by a 1/2 grade of muscles test to allow for improvement in functional activities such as performing chores.  2.Pt to improve range of motion by 25% to allow for improved functional mobility to allow for improvement in IADLs.   3.Pt to report compliance with HEP and demonstrate proper exercise technique to PT to show competence with self management of condition.  4.Decrease pain by 25% during functional activities.  5. Improved TUG time to 14 seconds with FWW     Plan     Certification Period: 8/1/18 to 11/1/18 (PN due by 9/1/18)    Continue with established Plan of Care towards PT goals.    Therapist: Danis Charles, PT  8/24/2018

## 2018-08-28 ENCOUNTER — CLINICAL SUPPORT (OUTPATIENT)
Dept: REHABILITATION | Facility: HOSPITAL | Age: 78
End: 2018-08-28
Payer: MEDICARE

## 2018-08-28 DIAGNOSIS — R26.2 DIFFICULTY WALKING: Primary | ICD-10-CM

## 2018-08-28 DIAGNOSIS — R29.898 WEAKNESS OF RIGHT LOWER EXTREMITY: ICD-10-CM

## 2018-08-28 DIAGNOSIS — M62.81 MUSCLE WEAKNESS: ICD-10-CM

## 2018-08-28 DIAGNOSIS — M25.661 DECREASED RANGE OF MOTION OF RIGHT KNEE: ICD-10-CM

## 2018-08-28 PROCEDURE — 97110 THERAPEUTIC EXERCISES: CPT | Mod: PN

## 2018-08-28 NOTE — PROGRESS NOTES
"                                                    Physical Therapy Daily Note     Name: Alan LOPEZ [facility] Number: [id number]  Diagnosis:   Encounter Diagnoses   Name Primary?    Difficulty walking Yes    Muscle weakness     Weakness of right lower extremity     Decreased range of motion of right knee      Physician: Zohreh Coleman NP  Precautions: Fall risk  Visit #: 4 of 20  HUERTAS: 12/31/2018    PN due by 9/28/18    Time In: 10:45  Time Out: 11:45    Total Treatment Time: 60 mins     Subjective     Patient reports: his balance is doing a little better. Reports good compliance with home program.   Pain Scale: Alan LOPEZ rates pain on a scale of 0-10 to be 0 currently.    Objective      Observation:  Pt presents ambulating with a FWW, crouched posture, decreased step length and velocity. No sign of swelling globally.  Fair VMO response.      Range of Motion/Strength:   Knee  Right      LEFT          AROM  PROM  MMT  AROM  PROM  MMT    Flexion  115 NT 4/5  128 NT 4/5    Extension  -4 NT 4/5  0 NT 4/5       AROM: Right LE: WFL Left LE: WFL  Impaired: right knee ROM        TUG: 15 seconds with FWW         Alan LOPEZ received individual therapeutic exercises to develop strength, endurance, ROM, flexibility, posture and core stabilization for 55 minutes including:    Nustep: 8 minutes -   Quad sets: 20 x 5 sec hold - RLE  Heel prop: 2 minutes x 3 trials  SLR: 2 x 10 x 2#  SAQ: 3 x 10 x 3# x 3 sec hold - small blue bolster  -sidelying hip abd 2 x 10  Mini squats: 2 x 10  Standing double leg balance NBOS with HT x 5 minutes  Standing heel raises: 3 x 10  Standing hip abduction: Red TB x 2 x 10  Step ups on 6" step: 2 x 10  Modified single leg balance with 4 in block with HT    Alan LOPEZ received the following manual therapy techniques:   5 min x Patellar mobilization, posterior femorotibial glides grade 3; PROM    The patient received the following supervised modalities after being cleared for contradictions: " cold pack to Right knee x 10 minutes post treatment. NP    Written Home Exercises Provided: Reviewed.       Assessment     No c/o increased discomfort with prescribed activities. Mild improvements in PROM in involved knee.   Able to increase difficulty of balance activities to CGA 10%. Pt demonstrates a good understanding of the education provided and a good return demonstration of activities. Pt  Requires skilled supervision to complete and progress home program.    Medical necessity is demonstrated by the following IMPAIRMENTS:  -pain   -decreased range of motion/flexibility   -decreased muscle strength   -impaired function    -decreased ADL ability  -decreased recreational ability     Patient is making good progress towards established goals.    Goals as follows:  Short Term Goals (4 Weeks):  Updated 8/28/18  Partially MET  1.Pt to increase strength by a 1/2 grade of muscles test to allow for improvement in functional activities such as performing chores.  Not MET  2.Pt to improve range of motion by 25% to allow for improved functional mobility to allow for improvement in IADLs. MET  3.Pt to report compliance with HEP and demonstrate proper exercise technique to PT to show competence with self management of condition. MET  4.Decrease pain by 25% during functional activities. MET  5. Improved TUG time to 14 seconds with FWW: NOT MET     Plan     Certification Period: 8/1/18 to 11/1/18     Continue with established Plan of Care towards PT goals.    Therapist: Danis Charles, PT  8/28/2018

## 2018-08-31 ENCOUNTER — CLINICAL SUPPORT (OUTPATIENT)
Dept: REHABILITATION | Facility: HOSPITAL | Age: 78
End: 2018-08-31
Payer: MEDICARE

## 2018-08-31 DIAGNOSIS — R26.2 DIFFICULTY WALKING: ICD-10-CM

## 2018-08-31 PROCEDURE — 97110 THERAPEUTIC EXERCISES: CPT | Mod: PN

## 2018-08-31 NOTE — PROGRESS NOTES
"                                                    Physical Therapy Daily Note     Name: Alan LOPEZ [facility] Number: [id number]  Diagnosis:   Encounter Diagnosis   Name Primary?    Difficulty walking      Physician: Zohreh Coleman NP  Precautions: Fall risk  Visit #: 5 of 20  HUERTAS: 12/31/2018    PN due by 9/28/18    Time In: 1035  Time Out: 1125  Total Treatment Time: 50 mins (1:1 w/PTA x 30 mins)    Subjective     Patient reports: No pain  In knee today. Patient reports feeling that strength and balance has been improving.   Pain Scale: Alan LOPEZ rates pain on a scale of 0-10 to be 2 currently.    Objective      Observation:  Pt presents ambulating with a FWW, crouched posture, decreased step length and velocity. No sign of swelling globally.  Fair VMO response.      Range of Motion/Strength:   Knee  Right      LEFT          AROM  PROM  MMT  AROM  PROM  MMT    Flexion  115 NT 4/5  128 NT 4/5    Extension  -4 NT 4/5  0 NT 4/5       AROM: Right LE: WFL Left LE: WFL  Impaired: right knee ROM        TUG: 15 seconds with FWW         Alan LOPEZ received individual therapeutic exercises to develop strength, endurance, ROM, flexibility, posture and core stabilization for 50 minutes including:    Nustep: 8 minutes   Quad sets: 20 x 5 sec hold - RLE  Heel prop: 2 minutes x 3 trials  SLR: 2 x 10 x 2#  SAQ: 3 x 10 x 3# x 3 sec hold - small blue bolster  Bridging 10 x 2  Mini squats: 2 x 10  Standing hip abd 10 x 2  Standing knee flexion 10 x 2  Standing hip extension 10 x 2   Standing calf stretch 3 x 30"  +Standing double leg balance NBOS with HT x 5 minutes  +Standing heel raises: 3 x 10  +Standing hip abduction 2 x 10   +Standing TKE RTB 2 x 10   Step ups on 6" step: 2 x 10  +Single leg step down R LE 4 inch step 2 x 10   Modified single leg balance with 4 in block with HT    Alan LOPEZ received the following manual therapy techniques:   5 min x Patellar mobilization, posterior femorotibial glides grade 3; PROM    The " patient received the following supervised modalities after being cleared for contradictions: cold pack to Right knee x 0 minutes post treatment. NP    Written Home Exercises Provided: Reviewed.       Assessment     No reports of increased pain at end of session. Patient declined ice and wanted to end session early due to rain/weather.     Medical necessity is demonstrated by the following IMPAIRMENTS:  -pain   -decreased range of motion/flexibility   -decreased muscle strength   -impaired function    -decreased ADL ability  -decreased recreational ability     Patient is making good progress towards established goals.    Goals as follows:  Short Term Goals (4 Weeks):  Updated 8/28/18  Partially MET  1.Pt to increase strength by a 1/2 grade of muscles test to allow for improvement in functional activities such as performing chores.  Not MET  2.Pt to improve range of motion by 25% to allow for improved functional mobility to allow for improvement in IADLs. MET  3.Pt to report compliance with HEP and demonstrate proper exercise technique to PT to show competence with self management of condition. MET  4.Decrease pain by 25% during functional activities. MET  5. Improved TUG time to 14 seconds with FWW: NOT MET     Plan     Certification Period: 8/1/18 to 11/1/18     Continue with established Plan of Care towards PT goals.    Therapist: Jimbo Alberto, PTA  8/31/2018

## 2018-09-04 ENCOUNTER — CLINICAL SUPPORT (OUTPATIENT)
Dept: REHABILITATION | Facility: HOSPITAL | Age: 78
End: 2018-09-04
Payer: MEDICARE

## 2018-09-04 DIAGNOSIS — R29.898 WEAKNESS OF RIGHT LOWER EXTREMITY: ICD-10-CM

## 2018-09-04 DIAGNOSIS — M62.81 MUSCLE WEAKNESS: ICD-10-CM

## 2018-09-04 DIAGNOSIS — R26.2 DIFFICULTY WALKING: Primary | ICD-10-CM

## 2018-09-04 DIAGNOSIS — G89.29 CHRONIC PAIN OF RIGHT KNEE: ICD-10-CM

## 2018-09-04 DIAGNOSIS — M25.661 DECREASED RANGE OF MOTION OF RIGHT KNEE: ICD-10-CM

## 2018-09-04 DIAGNOSIS — M25.561 CHRONIC PAIN OF RIGHT KNEE: ICD-10-CM

## 2018-09-04 PROCEDURE — 97110 THERAPEUTIC EXERCISES: CPT | Mod: PN

## 2018-09-04 NOTE — PROGRESS NOTES
"                                                    Physical Therapy Daily Note     Name: Alan LOPEZ [facility] Number: [id number]  Diagnosis:   Encounter Diagnoses   Name Primary?    Difficulty walking Yes    Muscle weakness     Weakness of right lower extremity     Decreased range of motion of right knee     Chronic pain of right knee      Physician: Zohreh Coleman NP  Precautions: Fall risk  Visit #: 6 of 20  HUERTAS: 12/31/2018    PN due by 9/28/18    Time In: 1025  Time Out: 1130  Total Treatment Time: 65 minutes    Subjective     Patient reports: his knee and balance is doing a little better.   Pain Scale: Alan LOPEZ rates pain on a scale of 0-10 to be 0 currently.      Objective        Alan LOPEZ received individual therapeutic exercises to develop strength, endurance, ROM, flexibility, posture and core stabilization for 55 minutes including:    Nustep: 8 minutes     Standing hip abd 10 x 2  Standing knee flexion 10 x 2  Standing hip extension 10 x 2   -standing hamstring stretch on step 20 sec x 4  Standing calf stretch 3 x 30"  +Standing double leg balance NBOS with HT x 5 minutes on arix  -double leg balance on tiltbard a/p Lateral  +Standing heel raises: 3 x 10  +Standing hip abduction 2 x 10   +Standing TKE RTB 2 x 10   Step ups on 6" step: 2 x 10  Modified single leg balance with 4 in block with HT      Not performed:     Alan LOPEZ received the following manual therapy techniques:   5 min x Patellar mobilization, posterior femorotibial glides grade 3; PROM    The patient received the following supervised modalities after being cleared for contradictions: cold pack to Right knee x 0 minutes post treatment. NP    Written Home Exercises Provided: Reviewed.       Assessment     No c/o increased discomfort with prescribed activities. Able to increase difficulty of standing balance activities to SBA.  Tiltboard 30% CGA. Pt demonstrates a good understanding of the education provided and a good return " demonstration of activities. Pt  Requires skilled supervision to complete and progress home program.    Medical necessity is demonstrated by the following IMPAIRMENTS:  -pain   -decreased range of motion/flexibility   -decreased muscle strength   -impaired function    -decreased ADL ability  -decreased recreational ability     Patient is making good progress towards established goals.    Goals as follows:  Short Term Goals (4 Weeks):  Updated 8/28/18  Partially MET  1.Pt to increase strength by a 1/2 grade of muscles test to allow for improvement in functional activities such as performing chores.  Not MET  2.Pt to improve range of motion by 25% to allow for improved functional mobility to allow for improvement in IADLs. MET  3.Pt to report compliance with HEP and demonstrate proper exercise technique to PT to show competence with self management of condition. MET  4.Decrease pain by 25% during functional activities. MET  5. Improved TUG time to 14 seconds with FWW: NOT MET     Plan     Certification Period: 8/1/18 to 11/1/18     Continue with established Plan of Care towards PT goals.  Will d/c next visit pending pt presentation.     Therapist: Danis Charles, PT  9/4/2018

## 2018-09-07 ENCOUNTER — CLINICAL SUPPORT (OUTPATIENT)
Dept: REHABILITATION | Facility: HOSPITAL | Age: 78
End: 2018-09-07
Payer: MEDICARE

## 2018-09-07 DIAGNOSIS — M25.661 DECREASED RANGE OF MOTION OF RIGHT KNEE: ICD-10-CM

## 2018-09-07 DIAGNOSIS — R26.2 DIFFICULTY WALKING: Primary | ICD-10-CM

## 2018-09-07 DIAGNOSIS — R29.898 WEAKNESS OF RIGHT LOWER EXTREMITY: ICD-10-CM

## 2018-09-07 DIAGNOSIS — M62.81 MUSCLE WEAKNESS: ICD-10-CM

## 2018-09-07 PROCEDURE — G8979 MOBILITY GOAL STATUS: HCPCS | Mod: CJ,PN

## 2018-09-07 PROCEDURE — G8980 MOBILITY D/C STATUS: HCPCS | Mod: CK,PN

## 2018-09-07 PROCEDURE — 97110 THERAPEUTIC EXERCISES: CPT | Mod: PN

## 2018-09-07 NOTE — PROGRESS NOTES
"                                                    Physical Therapy Discharge Note     Name: Alan LOPEZ [facility] Number: [id number]  Diagnosis:   Encounter Diagnoses   Name Primary?    Difficulty walking Yes    Muscle weakness     Weakness of right lower extremity     Decreased range of motion of right knee      Physician: Zohreh Coleman NP  Precautions: Fall risk  Visit #:   HUERTAS: 2018    PN due by 18    Time In: 1025  Time Out: 1130  Total Treatment Time: 65 minutes  (1 on 1 with PT for 45 minutes)    Subjective     Patient reports: his knee and balance is doing a little better.  Reports compliance with home program.  States he would like this to be his last day.   Pain Scale: Alan LOPEZ rates pain on a scale of 0-10 to be 0 currently.      Objective      Observation:  Pt presents ambulating with a FWW, crouched posture, decreased step length and velocity. Fair to good VMO response.      Range of Motion/Strength:   Knee  Right      LEFT          AROM  PROM  MMT  AROM  PROM  MMT    Flexion  116 NT 4/5  128 NT 4/5    Extension  -4 NT 4/5  0 NT 4/5       AROM: Right LE: WFL Left LE: WFL  Impaired: right knee ROM        TU seconds with FWW              Alan LOPEZ received individual therapeutic exercises to develop strength, endurance, ROM, flexibility, posture and core stabilization for 55 minutes including:    Nustep: 8 minutes     Standing hip abd 10 x 2  Standing knee flexion 10 x 2  Standing hip extension 10 x 2   -standing hamstring stretch on step 20 sec x 4  Standing calf stretch 3 x 30"  +Standing double leg balance NBOS with HT x 5 minutes on arix  -double leg balance on tiltbard a/p Lateral  +Standing heel raises: 3 x 10  +Standing hip abduction 2 x 10   +Standing TKE RTB 2 x 10   Step ups on 6" step: 2 x 10  Modified single leg balance with 4 in block with HT      Written Home Exercises Provided: Reviewed.       Assessment       Goals as follows:  Short Term Goals (8 Weeks): "  MET  1.Pt to increase strength by a 1/2 grade of muscles test to allow for improvement in functional activities such as performing chores. MET  2.Pt to improve range of motion by 25% to allow for improved functional mobility to allow for improvement in IADLs. MET  3.Pt to report compliance with HEP and demonstrate proper exercise technique to PT to show competence with self management of condition. MET  4.Decrease pain by 25% during functional activities. MET  5. Improved TUG time to 14 seconds with FWW, MET    CMS Impairment/Limitation/Restriction for FOTO Knee Survey  Status Limitation G-Code CMS Severity Modifier  Intake 60% 40%  Predicted 64% 36% Goal Status+ CJ - At least 20 percent but less than 40 percent  9/7/2018 58% 42% Current Status CK - At least 40 percent but less than 60 percent    Mr. Gutiérrez  has attended 7 sessions in our clinic beginning 8/1/18  for PT consisting of manual therapy, modalities, ROM activities, therex and HEP instruction. The patient has responded well to physical therapy intervention. Demonstrates a good understanding of home program. Patient will discharged secondary to diminished complaints and partial goal achievement.      Plan     D/c to comprehensive home program. Pt to follow up with MD if further therapy is warranted.     Therapist: Danis Charles, PT  9/7/2018

## 2018-11-27 ENCOUNTER — OFFICE VISIT (OUTPATIENT)
Dept: CARDIOLOGY | Facility: CLINIC | Age: 78
End: 2018-11-27
Attending: INTERNAL MEDICINE
Payer: MEDICARE

## 2018-11-27 VITALS
DIASTOLIC BLOOD PRESSURE: 83 MMHG | HEART RATE: 88 BPM | WEIGHT: 177 LBS | HEIGHT: 68 IN | BODY MASS INDEX: 26.83 KG/M2 | SYSTOLIC BLOOD PRESSURE: 137 MMHG

## 2018-11-27 DIAGNOSIS — I48.21 PERMANENT ATRIAL FIBRILLATION: ICD-10-CM

## 2018-11-27 DIAGNOSIS — Z79.01 CHRONIC ANTICOAGULATION: ICD-10-CM

## 2018-11-27 DIAGNOSIS — I73.9 PERIPHERAL ARTERY DISEASE: ICD-10-CM

## 2018-11-27 DIAGNOSIS — I48.20 CHRONIC ATRIAL FIBRILLATION: ICD-10-CM

## 2018-11-27 DIAGNOSIS — E78.00 HYPERCHOLESTEROLEMIA: ICD-10-CM

## 2018-11-27 DIAGNOSIS — I65.23 BILATERAL CAROTID ARTERY STENOSIS: ICD-10-CM

## 2018-11-27 DIAGNOSIS — I50.42 HEART FAILURE, SYSTOLIC AND DIASTOLIC, CHRONIC: ICD-10-CM

## 2018-11-27 DIAGNOSIS — I10 ESSENTIAL HYPERTENSION: ICD-10-CM

## 2018-11-27 DIAGNOSIS — Z86.73 HISTORY OF CEREBROVASCULAR ACCIDENT: ICD-10-CM

## 2018-11-27 PROCEDURE — 3079F DIAST BP 80-89 MM HG: CPT | Mod: CPTII,S$GLB,, | Performed by: INTERNAL MEDICINE

## 2018-11-27 PROCEDURE — 99214 OFFICE O/P EST MOD 30 MIN: CPT | Mod: S$GLB,,, | Performed by: INTERNAL MEDICINE

## 2018-11-27 PROCEDURE — 1101F PT FALLS ASSESS-DOCD LE1/YR: CPT | Mod: CPTII,S$GLB,, | Performed by: INTERNAL MEDICINE

## 2018-11-27 PROCEDURE — 3075F SYST BP GE 130 - 139MM HG: CPT | Mod: CPTII,S$GLB,, | Performed by: INTERNAL MEDICINE

## 2018-11-27 RX ORDER — DIGOXIN 125 MCG
125 TABLET ORAL DAILY
Qty: 90 TABLET | Refills: 3 | Status: SHIPPED | OUTPATIENT
Start: 2018-11-27 | End: 2019-03-08 | Stop reason: SDUPTHER

## 2018-11-27 RX ORDER — METOPROLOL SUCCINATE 50 MG/1
50 TABLET, EXTENDED RELEASE ORAL DAILY
Qty: 90 TABLET | Refills: 3 | Status: SHIPPED | OUTPATIENT
Start: 2018-11-27 | End: 2019-02-08 | Stop reason: SDUPTHER

## 2018-11-27 NOTE — PROGRESS NOTES
Subjective:     Alan Gutiérrez is a 77 y.o. male with hypertension and hypercholesterolemia. He is overweight. He has a history of a cerebrovascular accident in 2004 and then had right carotid endarterectomy. He was diagnosed with atrial fibrillation in 2010. He has CHADS2 score of 5 (CHAS2) and a [id number] score of 7 (LCP0L2V) being on apixiban. He has well compensated systolic as well as diastolic heart failure. He underwent left CEA on 9/12/2014. He has received DRAKE injection which has helped his back pain. He has been bothered by swelling of especially the left ankle which has improved after he began wrapping the legs. He presented with severe pain in the right knee on 1/7/2018 and had hemarthrosis of the right knee in the setting of having had an elevated INR and a knee injection. He later had the warfarin changed to apixiban. He denies any chest pain or dyspnea. No palpitations or weak spells. No bleeding. Feeling fair overall.      Congestive Heart Failure   Presents for follow-up visit. Pertinent negatives include no abdominal pain, chest pain, chest pressure, claudication, edema, fatigue, muscle weakness, near-syncope, nocturia, orthopnea, palpitations, paroxysmal nocturnal dyspnea, shortness of breath or unexpected weight change. The symptoms have been stable.   Atrial Fibrillation   Presents for follow-up visit. Symptoms include hypertension. Symptoms are negative for bradycardia, chest pain, dizziness, hemodynamic instability, hypotension, palpitations, shortness of breath, syncope, tachycardia and weakness. The symptoms have been stable. Past medical history includes atrial fibrillation, CHF and hyperlipidemia.   Cerebrovascular Accident   The current episode started today. The problem has been unchanged. Pertinent negatives include no abdominal pain, anorexia, arthralgias, change in bowel habit, chest pain, chills, congestion, coughing, diaphoresis, fatigue, fever, headaches, joint swelling,  myalgias, nausea, neck pain, numbness, rash, sore throat, swollen glands, urinary symptoms, vertigo, visual change, vomiting or weakness.   Hypertension   This is a chronic problem. The current episode started today. The problem is unchanged. The problem is controlled (usually 130-140/70-85 mmHg at home). Pertinent negatives include no anxiety, blurred vision, chest pain, headaches, malaise/fatigue, neck pain, orthopnea, palpitations, peripheral edema, PND, shortness of breath or sweats. There is no history of chronic renal disease.   Hyperlipidemia   This is a chronic problem. The current episode started more than 1 year ago. The problem is controlled. Recent lipid tests were reviewed and are normal. He has no history of chronic renal disease, diabetes, hypothyroidism, liver disease, obesity or nephrotic syndrome. Pertinent negatives include no chest pain, focal sensory loss, focal weakness, leg pain, myalgias or shortness of breath.       Review of Systems   Constitution: Negative for chills, diaphoresis, fatigue, fever, weakness, malaise/fatigue and unexpected weight change.   HENT: Negative for congestion and sore throat.    Eyes: Negative for blurred vision, vision loss in left eye and vision loss in right eye.   Cardiovascular: Negative for chest pain, claudication, dyspnea on exertion, irregular heartbeat, near-syncope, orthopnea, palpitations, paroxysmal nocturnal dyspnea and syncope.   Respiratory: Negative for cough, shortness of breath and wheezing.    Endocrine: Negative for cold intolerance.   Hematologic/Lymphatic: Negative for bleeding problem. Does not bruise/bleed easily.   Skin: Negative for rash.   Musculoskeletal: Positive for joint pain (right knee). Negative for arthralgias, falls, gout, joint swelling, muscle weakness, myalgias and neck pain.   Gastrointestinal: Negative for abdominal pain, anorexia, change in bowel habit, hematochezia, hemorrhoids, jaundice, melena, nausea and vomiting.  "  Genitourinary: Negative for frequency, hematuria and nocturia.   Neurological: Negative for dizziness, focal weakness, headaches, light-headedness, loss of balance, numbness, tremors and vertigo.   Psychiatric/Behavioral: Negative for altered mental status, depression and memory loss. The patient is not nervous/anxious.    Allergic/Immunologic: Negative for hives and persistent infections.       Current Outpatient Medications on File Prior to Visit   Medication Sig Dispense Refill    allopurinol (ZYLOPRIM) 100 MG tablet Take 100 mg by mouth once daily.      apixaban 5 mg Tab Take 1 tablet (5 mg total) by mouth 2 (two) times daily. 180 tablet 3    calcium citrate (CALCITRATE) 200 mg (950 mg) tablet Take 1 tablet by mouth once daily.      cloNIDine (CATAPRES) 0.1 MG tablet take one tablet by mouth every 8 hours at 7 am, 1 pm, and 9 pm  1    digoxin (LANOXIN) 125 mcg tablet TAKE 1 TABLET ONE TIME DAILY 90 tablet 3    finasteride (PROSCAR) 5 mg tablet Take 5 mg by mouth once daily.      furosemide (LASIX) 40 MG tablet Take 1 tablet (40 mg total) by mouth once daily. 30 tablet 11    losartan (COZAAR) 100 MG tablet Take 100 mg by mouth once daily.      metoprolol succinate (TOPROL-XL) 50 MG 24 hr tablet Take 1 tablet (50 mg total) by mouth once daily. 90 tablet 3    NIFEdipine (PROCARDIA-XL) 30 MG (OSM) 24 hr tablet Take 30 mg by mouth every morning.      NIFEdipine (PROCARDIA-XL) 60 MG (OSM) 24 hr tablet Take 60 mg by mouth every evening.      oxyCODONE-acetaminophen (PERCOCET) 5-325 mg per tablet 1 tab every 4 hours PRN pain or 2 tablets every 6 hours PRN pain 90 tablet 0    potassium chloride (MICRO-K) 10 MEQ CpSR Take 10 mEq by mouth once daily.       rosuvastatin (CRESTOR) 40 MG Tab Take 40 mg by mouth once daily.      vitamin D 1000 units Tab Take 185 mg by mouth once daily.       No current facility-administered medications on file prior to visit.        /83   Pulse 88   Ht 5' 8" (1.727 m)  "  Wt 80.3 kg (177 lb)   BMI 26.91 kg/m²       Objective:     Physical Exam   Constitutional: He is oriented to person, place, and time. He appears well-developed and well-nourished. He does not appear ill. No distress.   HENT:   Head: Normocephalic and atraumatic.   Nose: Nose normal.   Eyes: Right eye exhibits no discharge. Left eye exhibits no discharge. Right conjunctiva is not injected. Left conjunctiva is not injected. Right pupil is round. Left pupil is round. Pupils are equal.   Neck: Neck supple. No JVD present. Carotid bruit is not present. No thyromegaly present.   Cardiovascular: Normal rate, S1 normal and S2 normal. An irregularly irregular rhythm present.  No extrasystoles are present. PMI is not displaced. Exam reveals no gallop.   Murmur heard.  High-pitched blowing holosystolic murmur is present with a grade of 2/6 at the apex.  Pulses:       Radial pulses are 2+ on the right side, and 2+ on the left side.        Femoral pulses are 2+ on the right side, and 2+ on the left side.       Dorsalis pedis pulses are 2+ on the right side, and 1+ on the left side.        Posterior tibial pulses are 2+ on the right side, and 0 on the left side.   Pulmonary/Chest: Effort normal and breath sounds normal.   Abdominal: Soft. Normal appearance. There is no hepatosplenomegaly. There is no tenderness.   Musculoskeletal:        Right ankle: He exhibits swelling. He exhibits no ecchymosis and no deformity.        Left ankle: He exhibits swelling. He exhibits no ecchymosis and no deformity.   Lymphadenopathy:        Head (right side): No submandibular adenopathy present.        Head (left side): No submandibular adenopathy present.     He has no cervical adenopathy.   Neurological: He is alert and oriented to person, place, and time. He is not disoriented. No cranial nerve deficit or sensory deficit.   Skin: Skin is warm, dry and intact. No rash noted. He is not diaphoretic.   Psychiatric: He has a normal mood and  affect. His speech is normal and behavior is normal. Judgment and thought content normal. Cognition and memory are normal.       Assessment:     1. Heart failure, systolic and diastolic, chronic    2. Permanent atrial fibrillation    3. Chronic anticoagulation    4. Bilateral carotid artery stenosis    5. History of cerebrovascular accident    6. Peripheral artery disease    7. Essential hypertension    8. Hypercholesterolemia        Plan:     1. Heart Failure, Systolic & Diastolic, Chronic              8/1/2016: Echo: Normal left ventricular size with mild systolic dysfunction. EF 45%. Markedly dilated LA. Moderate aortic valve sclerosis - 1.6 m/s.              1/8/2018: Normal left ventricular size with low normal systolic dysfunction. EF 50-55%. Anteroseptal wall mildly hypokinetic. Mild LVH. Markedly dilated LA. Moderate aortic valve sclerosis - 1.5 m/s.    Off ramipril 10 mg Q24 due to dry cough.   On metoprolol 50 mg Q24, losartan 100 mg Q24, furosemide 40 mg Q24 and KCl 10 mEq Q24.   Previously seen edema of ankles that is well controlled with pressure stockings.              Appears reasonably well compensated.          2. Atrial Fibrillation              2010: Diagnosed with permanent atrial fibrillation.              CHADS2=4 (CHAS2). [id number]=4 ([id number])              On warfarin.              On metoprolol 25 mg Q24 and digoxin 0.25 mg Q24.   1/15/2018:  bpm.              Rate appears high.   Increased metoprolol to 50 mg Q24.   1/23/2018: Holter: Atrial fibrillation 81 () bpm. Briefly in 30s at night. Longest RR 2.1 sec.   On metoprolol 50 mg Q24 and digoxin 0.125 mg Q24.    Rate well controlled.     3. Chronic Anticoagulation              2010: Diagnosed with permanent atrial fibrillation.              CHADS2=4 (CHAS2). [id number]=4 ([id number]).              Used to be on warfarin. Anticoagulation managed by Dr. Pressley.   1/13/2018: Began apixiban.   On apixiban 5 mg Q12.               No aspirin or NSAID.   No bleeding.                 4. Carotid Artery Stenosis              2004: Right CEA.              8/26/2014: Carotid Duplex: DIANNA: Mild. LICA: Critical - 3.4 m/s.              9/11/2014: [facility] [place] 4V: DIANNA: Mild. Left Vert: 90%. LICA: 95%.              9/2014: Left CEA.              12/1/2016: Carotid Duplex: Moderate plaquing.              12/13/2017: Carotid Duplex: Moderate plaquing.              No need for aspirin as anticoagulated with warfarin.              12/2018: Plan next Carotid Duplex. Then yearly.     5. History of Cerebrovascular Accident              5/2004: CVA. Left sided hemiplegia. Received tPA. Good recovery.              7/2004: Left temporal CVA. Weak.     6. Peripheral Artery Disease              10/25/2016: Arterial Duplex: Right: SFA: Distal 1.8 m/s. Left: No obstructive disease above knee.              Asymptomatic.     7. Hypertension              1990: Diagnosed.               On losartan 100 mg Q24, metoprolol 50 mg Q24, clonidine 0.1 mg Q8, nifedipine 30 mg QAM & 60 mg QPM and furosemide 40 mg Q24.              Keeping log at home              Well controlled overall.     8. Hypercholesterolemia              2004: Began statin.              On rosuvastatin 40 mg Q24.              Tells me well controlled.     9. History of Knee Replacement   1/5/2018: Right knee pain became severe.   3/9/2018: Right knee replacement.              Dr. Nicholas Gonzalez.    10. Hyperuricemia   2008: Diagnosed.   On allopurinol 100 mg Q24    11. Primary Care   Dr. Dave Luna Jr..    F/u 4 months.    Zachary Pressley M.D.      12/12/2018 8:05 PM, Addendum:    12/10/2018: Carotid Duplex: Moderate plaquing.    I discussed the above test result and the implications of the findings over the phone.    Zachary Pressley M.D.

## 2018-12-04 DIAGNOSIS — I48.21 PERMANENT ATRIAL FIBRILLATION: ICD-10-CM

## 2018-12-04 DIAGNOSIS — Z79.01 CHRONIC ANTICOAGULATION: ICD-10-CM

## 2018-12-10 ENCOUNTER — CLINICAL SUPPORT (OUTPATIENT)
Dept: CARDIOLOGY | Facility: CLINIC | Age: 78
End: 2018-12-10
Attending: INTERNAL MEDICINE
Payer: MEDICARE

## 2018-12-10 DIAGNOSIS — I65.23 BILATERAL CAROTID ARTERY STENOSIS: ICD-10-CM

## 2018-12-10 PROCEDURE — 93880 EXTRACRANIAL BILAT STUDY: CPT | Mod: S$GLB,,, | Performed by: INTERNAL MEDICINE

## 2019-02-08 DIAGNOSIS — I48.21 PERMANENT ATRIAL FIBRILLATION: ICD-10-CM

## 2019-02-08 RX ORDER — METOPROLOL SUCCINATE 50 MG/1
50 TABLET, EXTENDED RELEASE ORAL DAILY
Qty: 90 TABLET | Refills: 3 | Status: SHIPPED | OUTPATIENT
Start: 2019-02-08 | End: 2020-02-24

## 2019-03-08 DIAGNOSIS — I48.21 PERMANENT ATRIAL FIBRILLATION: ICD-10-CM

## 2019-03-08 RX ORDER — DIGOXIN 125 MCG
125 TABLET ORAL DAILY
Qty: 90 TABLET | Refills: 3 | Status: SHIPPED | OUTPATIENT
Start: 2019-03-08 | End: 2019-12-12

## 2019-03-26 ENCOUNTER — OFFICE VISIT (OUTPATIENT)
Dept: CARDIOLOGY | Facility: CLINIC | Age: 79
End: 2019-03-26
Attending: INTERNAL MEDICINE
Payer: MEDICARE

## 2019-03-26 VITALS
BODY MASS INDEX: 26.07 KG/M2 | WEIGHT: 172 LBS | DIASTOLIC BLOOD PRESSURE: 85 MMHG | HEART RATE: 72 BPM | HEIGHT: 68 IN | SYSTOLIC BLOOD PRESSURE: 135 MMHG

## 2019-03-26 DIAGNOSIS — I48.21 PERMANENT ATRIAL FIBRILLATION: ICD-10-CM

## 2019-03-26 DIAGNOSIS — Z87.39: ICD-10-CM

## 2019-03-26 DIAGNOSIS — Z79.01 CHRONIC ANTICOAGULATION: ICD-10-CM

## 2019-03-26 DIAGNOSIS — I65.23 BILATERAL CAROTID ARTERY STENOSIS: ICD-10-CM

## 2019-03-26 DIAGNOSIS — Z86.73 HISTORY OF CEREBROVASCULAR ACCIDENT: ICD-10-CM

## 2019-03-26 DIAGNOSIS — E78.00 HYPERCHOLESTEROLEMIA: ICD-10-CM

## 2019-03-26 DIAGNOSIS — I10 ESSENTIAL HYPERTENSION: ICD-10-CM

## 2019-03-26 DIAGNOSIS — I50.42 HEART FAILURE, SYSTOLIC AND DIASTOLIC, CHRONIC: ICD-10-CM

## 2019-03-26 DIAGNOSIS — I73.9 PERIPHERAL ARTERY DISEASE: ICD-10-CM

## 2019-03-26 PROCEDURE — 3079F DIAST BP 80-89 MM HG: CPT | Mod: CPTII,S$GLB,, | Performed by: INTERNAL MEDICINE

## 2019-03-26 PROCEDURE — 3075F SYST BP GE 130 - 139MM HG: CPT | Mod: CPTII,S$GLB,, | Performed by: INTERNAL MEDICINE

## 2019-03-26 PROCEDURE — 3079F PR MOST RECENT DIASTOLIC BLOOD PRESSURE 80-89 MM HG: ICD-10-PCS | Mod: CPTII,S$GLB,, | Performed by: INTERNAL MEDICINE

## 2019-03-26 PROCEDURE — 93000 ELECTROCARDIOGRAM COMPLETE: CPT | Mod: S$GLB,,, | Performed by: INTERNAL MEDICINE

## 2019-03-26 PROCEDURE — 93000 PR ELECTROCARDIOGRAM, COMPLETE: ICD-10-PCS | Mod: S$GLB,,, | Performed by: INTERNAL MEDICINE

## 2019-03-26 PROCEDURE — 99214 OFFICE O/P EST MOD 30 MIN: CPT | Mod: S$GLB,,, | Performed by: INTERNAL MEDICINE

## 2019-03-26 PROCEDURE — 3075F PR MOST RECENT SYSTOLIC BLOOD PRESS GE 130-139MM HG: ICD-10-PCS | Mod: CPTII,S$GLB,, | Performed by: INTERNAL MEDICINE

## 2019-03-26 PROCEDURE — 99214 PR OFFICE/OUTPT VISIT, EST, LEVL IV, 30-39 MIN: ICD-10-PCS | Mod: S$GLB,,, | Performed by: INTERNAL MEDICINE

## 2019-03-26 RX ORDER — OLMESARTAN MEDOXOMIL 40 MG/1
40 TABLET ORAL DAILY
COMMUNITY
Start: 2019-02-20 | End: 2019-08-08

## 2019-03-26 NOTE — PROGRESS NOTES
Subjective:     Alan Gutiérrez is a 78 y.o. male with hypertension and hypercholesterolemia. He is overweight. He has a history of a cerebrovascular accident in 2004 and then had right carotid endarterectomy. He was diagnosed with atrial fibrillation in 2010. He has CHADS2 score of 5 (CHAS2) and a [id number] score of 7 ([id number]) being on apixiban. He has well compensated systolic as well as diastolic heart failure. He underwent left CEA on 9/12/2014. He has received DRAKE injection which has helped his back pain. He has been bothered by swelling of especially the left ankle which has improved after he began wrapping the legs. He presented with severe pain in the right knee on 1/7/2018 and had hemarthrosis of the right knee in the setting of having had an elevated INR and a knee injection. He later had the warfarin changed to apixiban. He denies any chest pain or dyspnea. No palpitations or weak spells. No bleeding. Feeling fair overall.      Congestive Heart Failure   Presents for follow-up visit. Pertinent negatives include no abdominal pain, chest pain, chest pressure, claudication, edema, fatigue, muscle weakness, near-syncope, nocturia, orthopnea, palpitations, paroxysmal nocturnal dyspnea, shortness of breath or unexpected weight change. The symptoms have been stable.   Atrial Fibrillation   Presents for follow-up visit. Symptoms include hypertension. Symptoms are negative for bradycardia, chest pain, dizziness, hemodynamic instability, hypotension, palpitations, shortness of breath, syncope, tachycardia and weakness. The symptoms have been stable. Past medical history includes atrial fibrillation, CHF and hyperlipidemia.   Hypertension   This is a chronic problem. The current episode started today. The problem is unchanged. The problem is controlled (usually 130-140/70-85 mmHg at home). Pertinent negatives include no anxiety, blurred vision, chest pain, headaches, malaise/fatigue, neck pain, orthopnea,  palpitations, peripheral edema, PND, shortness of breath or sweats. There is no history of chronic renal disease.   Hyperlipidemia   This is a chronic problem. The current episode started more than 1 year ago. The problem is controlled. Recent lipid tests were reviewed and are normal. He has no history of chronic renal disease, diabetes, hypothyroidism, liver disease, obesity or nephrotic syndrome. Pertinent negatives include no chest pain, focal sensory loss, focal weakness, leg pain, myalgias or shortness of breath.   Cerebrovascular Accident   The current episode started today. The problem has been unchanged. Pertinent negatives include no abdominal pain, anorexia, arthralgias, change in bowel habit, chest pain, chills, congestion, coughing, diaphoresis, fatigue, fever, headaches, joint swelling, myalgias, nausea, neck pain, numbness, rash, sore throat, swollen glands, urinary symptoms, vertigo, visual change, vomiting or weakness.       Review of Systems   Constitution: Negative for chills, diaphoresis, fatigue, fever, malaise/fatigue and unexpected weight change.   HENT: Negative for congestion and sore throat.    Eyes: Negative for blurred vision, vision loss in left eye and vision loss in right eye.   Cardiovascular: Negative for chest pain, claudication, dyspnea on exertion, irregular heartbeat, near-syncope, orthopnea, palpitations, paroxysmal nocturnal dyspnea and syncope.   Respiratory: Negative for cough, shortness of breath and wheezing.    Endocrine: Negative for cold intolerance.   Hematologic/Lymphatic: Negative for bleeding problem. Does not bruise/bleed easily.   Skin: Negative for rash.   Musculoskeletal: Positive for joint pain (right knee). Negative for arthralgias, falls, gout, joint swelling, muscle weakness, myalgias and neck pain.   Gastrointestinal: Negative for abdominal pain, anorexia, change in bowel habit, hematochezia, hemorrhoids, jaundice, melena, nausea and vomiting.    Genitourinary: Negative for frequency, hematuria and nocturia.   Neurological: Negative for dizziness, focal weakness, headaches, light-headedness, loss of balance, numbness, tremors, vertigo and weakness.   Psychiatric/Behavioral: Negative for altered mental status, depression and memory loss. The patient is not nervous/anxious.    Allergic/Immunologic: Negative for hives and persistent infections.       Current Outpatient Medications on File Prior to Visit   Medication Sig Dispense Refill    allopurinol (ZYLOPRIM) 100 MG tablet Take 100 mg by mouth once daily.      apixaban (ELIQUIS) 5 mg Tab Take 1 tablet (5 mg total) by mouth 2 (two) times daily. 180 tablet 3    calcium citrate (CALCITRATE) 200 mg (950 mg) tablet Take 1 tablet by mouth once daily.      cloNIDine (CATAPRES) 0.1 MG tablet take one tablet by mouth every 8 hours at 7 am, 1 pm, and 9 pm  1    digoxin (LANOXIN) 125 mcg tablet Take 1 tablet (125 mcg total) by mouth once daily. 90 tablet 3    finasteride (PROSCAR) 5 mg tablet Take 5 mg by mouth once daily.      furosemide (LASIX) 40 MG tablet Take 1 tablet (40 mg total) by mouth once daily. 30 tablet 11    losartan (COZAAR) 100 MG tablet Take 100 mg by mouth once daily.      metoprolol succinate (TOPROL-XL) 50 MG 24 hr tablet Take 1 tablet (50 mg total) by mouth once daily. 90 tablet 3    NIFEdipine (PROCARDIA-XL) 30 MG (OSM) 24 hr tablet Take 30 mg by mouth every morning.      NIFEdipine (PROCARDIA-XL) 60 MG (OSM) 24 hr tablet Take 60 mg by mouth every evening.      oxyCODONE-acetaminophen (PERCOCET) 5-325 mg per tablet 1 tab every 4 hours PRN pain or 2 tablets every 6 hours PRN pain 90 tablet 0    potassium chloride (MICRO-K) 10 MEQ CpSR Take 10 mEq by mouth once daily.       rosuvastatin (CRESTOR) 40 MG Tab Take 40 mg by mouth once daily.      vitamin D 1000 units Tab Take 185 mg by mouth once daily.       No current facility-administered medications on file prior to visit.        BP  "135/85   Pulse 72   Ht 5' 8" (1.727 m)   Wt 78 kg (172 lb)   BMI 26.15 kg/m²       Objective:     Physical Exam   Constitutional: He is oriented to person, place, and time. He appears well-developed and well-nourished. He does not appear ill. No distress.   HENT:   Head: Normocephalic and atraumatic.   Nose: Nose normal.   Eyes: Right eye exhibits no discharge. Left eye exhibits no discharge. Right conjunctiva is not injected. Left conjunctiva is not injected. Right pupil is round. Left pupil is round. Pupils are equal.   Neck: Neck supple. No JVD present. Carotid bruit is not present. No thyromegaly present.   Cardiovascular: Normal rate, S1 normal and S2 normal. An irregularly irregular rhythm present.  No extrasystoles are present. PMI is not displaced. Exam reveals no gallop.   Murmur heard.  High-pitched blowing holosystolic murmur is present with a grade of 2/6 at the apex.  Pulses:       Radial pulses are 2+ on the right side, and 2+ on the left side.        Femoral pulses are 2+ on the right side, and 2+ on the left side.       Dorsalis pedis pulses are 2+ on the right side, and 1+ on the left side.        Posterior tibial pulses are 2+ on the right side, and 0 on the left side.   Pulmonary/Chest: Effort normal and breath sounds normal.   Abdominal: Soft. Normal appearance. There is no hepatosplenomegaly. There is no tenderness.   Musculoskeletal:        Right ankle: He exhibits swelling. He exhibits no ecchymosis and no deformity.        Left ankle: He exhibits swelling. He exhibits no ecchymosis and no deformity.   Lymphadenopathy:        Head (right side): No submandibular adenopathy present.        Head (left side): No submandibular adenopathy present.     He has no cervical adenopathy.   Neurological: He is alert and oriented to person, place, and time. He is not disoriented. No cranial nerve deficit or sensory deficit.   Skin: Skin is warm, dry and intact. No rash noted. He is not diaphoretic. "   Psychiatric: He has a normal mood and affect. His speech is normal and behavior is normal. Judgment and thought content normal. Cognition and memory are normal.       Assessment:     1. Heart failure, systolic and diastolic, chronic    2. Permanent atrial fibrillation    3. Chronic anticoagulation    4. Bilateral carotid artery stenosis    5. History of cerebrovascular accident    6. Peripheral artery disease    7. Essential hypertension    8. Hypercholesterolemia    9. History of hemarthrosis        Plan:     1. Heart Failure, Systolic & Diastolic, Chronic              8/1/2016: Echo: Normal left ventricular size with mild systolic dysfunction. EF 45%. Markedly dilated LA. Moderate aortic valve sclerosis - 1.6 m/s.              1/8/2018: Normal left ventricular size with low normal systolic dysfunction. EF 50-55%. Anteroseptal wall mildly hypokinetic. Mild LVH. Markedly dilated LA. Moderate aortic valve sclerosis - 1.5 m/s.    Off ramipril 10 mg Q24 due to dry cough.   On metoprolol 50 mg Q24, olmesartan 40 mg Q24, furosemide 40 mg Q24 and KCl 10 mEq Q24.   May take extra furosemide 40 mg Q24 pm if needed for leg edema or fluid overload.   Previously seen edema of ankles that is well controlled with pressure stockings.              Appears reasonably well compensated.          2. Atrial Fibrillation              2010: Diagnosed with permanent atrial fibrillation.              [id number]=0 ([id number])              On warfarin.              On metoprolol 25 mg Q24 and digoxin 0.25 mg Q24.   1/15/2018:  bpm.              Rate appears high.   Increased metoprolol to 50 mg Q24.   1/23/2018: Holter: Atrial fibrillation 81 () bpm. Briefly in 30s at night. Longest RR 2.1 sec.   On metoprolol 50 mg Q24 and digoxin 0.125 mg Q24.    Rate well controlled.     3. Chronic Anticoagulation              2010: Diagnosed with permanent atrial fibrillation.              CHADS2=4 (CHAS2). [id number]=3 ([id number]).               Used to be on warfarin. Anticoagulation managed by Dr. Pressley.   1/13/2018: Began apixiban.   On apixiban 5 mg Q12.              No aspirin or NSAID.   No bleeding.                 4. Carotid Artery Stenosis              2004: Right CEA.              8/26/2014: Carotid Duplex: DIANNA: Mild. LICA: Critical - 3.4 m/s.              9/11/2014: [facility] [place]: DIANNA: Mild. Left Vert: 90%. LICA: 95%.              9/2014: Left CEA.              12/1/2016: Carotid Duplex: Moderate plaquing.              12/13/2017: Carotid Duplex: Moderate plaquing.   12/10/2018: Carotid Duplex: Moderate plaquing.              No need for aspirin as anticoagulated with warfarin.              12/2019: Plan next Carotid Duplex. Then yearly.     5. History of Cerebrovascular Accident              5/2004: CVA. Left sided hemiplegia. Received tPA. Good recovery.              7/2004: Left temporal CVA. Weak.     6. Peripheral Artery Disease              10/25/2016: Arterial Duplex: Right: SFA: Distal 1.8 m/s. Left: No obstructive disease above knee.              Asymptomatic.     7. Hypertension              1990: Diagnosed.               On metoprolol 50 mg Q24, clonidine 0.1 mg Q8, nifedipine 60 mg Q12, olmesartan 40 mg Q24, furosemide 40 mg Q24 and KCl 10 mEq Q24.   Keeping log at home              Well controlled overall.     8. Hypercholesterolemia              2004: Began statin.              On rosuvastatin 40 mg Q24.              Tells me well controlled.     9. History of Knee Replacement   1/5/2018: Right knee pain became severe.   3/9/2018: Right knee replacement.              Dr. Nicholas Gonzalez.    10. Hyperuricemia   2008: Diagnosed.   On allopurinol 100 mg Q24    11. Primary Care   Dr. Dave Luna Jr..    F/u 4 months.    Zachary Pressley M.D.      3/26/2019 8:05 PM, Addendum:    12/10/2018: Carotid Duplex: Moderate plaquing.    I discussed the above test result and the implications of the findings over the phone.    Zachary Pressley  M.D.

## 2019-05-15 ENCOUNTER — HOSPITAL ENCOUNTER (EMERGENCY)
Facility: HOSPITAL | Age: 79
Discharge: HOME | End: 2019-05-15
Attending: EMERGENCY MEDICINE
Payer: MEDICARE

## 2019-05-15 VITALS
WEIGHT: 173 LBS | DIASTOLIC BLOOD PRESSURE: 76 MMHG | HEIGHT: 68 IN | BODY MASS INDEX: 26.22 KG/M2 | SYSTOLIC BLOOD PRESSURE: 149 MMHG | TEMPERATURE: 100 F | OXYGEN SATURATION: 97 % | RESPIRATION RATE: 18 BRPM | HEART RATE: 73 BPM

## 2019-05-15 DIAGNOSIS — J40 BRONCHITIS: Primary | ICD-10-CM

## 2019-05-15 LAB
CTP QC/QA: YES
S PYO RRNA THROAT QL PROBE: NEGATIVE

## 2019-05-15 PROCEDURE — 87081 CULTURE SCREEN ONLY: CPT

## 2019-05-15 PROCEDURE — 87880 STREP A ASSAY W/OPTIC: CPT | Mod: ER

## 2019-05-15 PROCEDURE — 99284 EMERGENCY DEPT VISIT MOD MDM: CPT | Mod: 25,ER

## 2019-05-15 RX ORDER — PREDNISONE 10 MG/1
10 TABLET ORAL DAILY
Qty: 5 TABLET | Refills: 0 | Status: SHIPPED | OUTPATIENT
Start: 2019-05-15 | End: 2019-05-20

## 2019-05-15 RX ORDER — ALBUTEROL SULFATE 90 UG/1
2 AEROSOL, METERED RESPIRATORY (INHALATION) EVERY 6 HOURS PRN
Qty: 6.7 G | Refills: 0 | Status: SHIPPED | OUTPATIENT
Start: 2019-05-15

## 2019-05-15 RX ORDER — AZITHROMYCIN 250 MG/1
TABLET, FILM COATED ORAL
Qty: 6 TABLET | Refills: 0 | Status: SHIPPED | OUTPATIENT
Start: 2019-05-15

## 2019-05-16 NOTE — ED PROVIDER NOTES
Encounter Date: 5/15/2019    SCRIBE #1 NOTE: I, Rip Mckeon, am scribing for, and in the presence of,  Dr. Roach. I have scribed the following portions of the note - Other sections scribed: HPI, ROS, PE.       History     Chief Complaint   Patient presents with    Cough     for several weeks    Sore Throat    Nasal Congestion     onset approx 1 week     CC:   Coughing  HPI:  This is a 78 y.o. male who presents to the ED with a chief complaint of coughing that began several weeks ago.  Pt endorses fever, fatigue, nasal congestion, and a sore throat.  He has not taken medication for his symptoms.  Nothing worsens his symptoms.  He denies nausea, emesis, diarrhea, abdominal pain, SOB, or CP.      The history is provided by the patient.     Review of patient's allergies indicates:  No Known Allergies  Past Medical History:   Diagnosis Date    A-fib     Cancer     skin    Carotid artery stenosis 8/28/2014 8/26/2014: Carotid duplex: DIANNA: mild. LICA: critical - 3.4 m/s.    CHF (congestive heart failure)     Chronic anticoagulation 8/28/2014    Heart failure, systolic and diastolic, chronic 11/17/2016 8/1/2016: Echo: Normal left ventricular size with mild systolic dysfunction. EF 45%. Markedly dilated LA. Moderate aortic valve sclerosis - 1.6 m/s.    Herniated disc     Hypercholesterolemia 11/17/2016 2004: Began statin.    Hypertension     Hypertension 11/17/2016 1990: Diagnosed.    Lipids abnormal     OA (osteoarthritis)     Osteopenia     Peripheral artery disease 12/1/2016    10/25/2016: Arterial Duplex: Right: SFA: Distal 1.8 m/s. Left: No obstructive disease above knee.    Stroke     2004     Past Surgical History:   Procedure Laterality Date    ARCH & 4 VESSEL STUDY N/A 9/11/2014    Performed by [facility] at [facility] CATH LAB    BLADDER TUMOR EXCISION      CAROTID ENDARTERECTOMY      Left/right    ENDARTERECTOMY-CAROTID Left 9/12/2014    Performed by Marco Antonio Santana III,  MD at [facility] OR    EYE SURGERY      bilateral kory    HERNIA REPAIR      JOINT REPLACEMENT Right 03/09/2018    knee scope Right     RADIOFREQUENCY ABLATION BONE      back    REPLACEMENT-KNEE WITH NAVIGATION Right 3/9/2018    Performed by Nicholas Gonzalez MD at [facility] OR    SKIN BIOPSY      ca    TONSILLECTOMY      tumors removed chest Bilateral      History reviewed. No pertinent family history.  Social History     Tobacco Use    Smoking status: Never Smoker    Smokeless tobacco: Never Used   Substance Use Topics    Alcohol use: Yes     Comment: couple times yearly     Drug use: No     Review of Systems   Constitutional: Positive for fatigue and fever.   HENT: Positive for congestion and sore throat.    Eyes: Negative.  Negative for pain.   Respiratory: Positive for cough. Negative for shortness of breath.    Cardiovascular: Negative.  Negative for chest pain.   Gastrointestinal: Negative.  Negative for abdominal pain, diarrhea, nausea and vomiting.   Genitourinary: Negative.  Negative for dysuria.   Musculoskeletal: Negative.  Negative for back pain.   Skin: Negative.  Negative for rash.   Neurological: Negative.  Negative for headaches.   Hematological: Negative.    Psychiatric/Behavioral: Negative.    All other systems reviewed and are negative.      Physical Exam     Initial Vitals [05/15/19 1829]   BP Pulse Resp Temp SpO2   (!) 155/84 87 20 100.1 °F (37.8 °C) --      MAP       --         Physical Exam    Nursing note and vitals reviewed.  Constitutional: He appears well-developed and well-nourished.   HENT:   Head: Normocephalic and atraumatic.   Right Ear: Tympanic membrane and external ear normal.   Left Ear: Tympanic membrane and external ear normal.   Nose: Nose normal.   Mouth/Throat: Uvula is midline and mucous membranes are normal. Posterior oropharyngeal erythema present. No oropharyngeal exudate, posterior oropharyngeal edema or tonsillar abscesses.   Eyes: Conjunctivae and EOM are normal.    Neck: Normal range of motion. Neck supple.   Cardiovascular: Normal rate and intact distal pulses.   Pulmonary/Chest: Effort normal. No respiratory distress.   Abdominal: Soft. There is no tenderness.   Musculoskeletal: Normal range of motion.   Neurological: He is alert and oriented to person, place, and time.   Skin: Skin is warm and dry.   Psychiatric: He has a normal mood and affect. His behavior is normal.         ED Course   Procedures  Labs Reviewed   CULTURE, STREP A,  THROAT   POCT RAPID STREP A          Imaging Results          X-Ray Chest PA And Lateral (Final result)  Result time 05/15/19 18:45:13    Final result by Surinder Shen MD (05/15/19 18:45:13)                 Impression:      No acute abnormality.      Electronically signed by: Surinder Shen MD  Date:    05/15/2019  Time:    18:45             Narrative:    EXAMINATION:  XR CHEST PA AND LATERAL    CLINICAL HISTORY:  Cough;    TECHNIQUE:  PA and lateral views of the chest were performed.    COMPARISON:  09/12/2014    FINDINGS:  The lungs are clear, with normal appearance of pulmonary vasculature and no pleural effusion or pneumothorax.    The cardiac silhouette is normal in size. The hilar and mediastinal contours are unremarkable.    Bones are intact.                                 Medical Decision Making:   Clinical Tests:   Lab Tests: Reviewed and Ordered  Radiological Study: Ordered and Reviewed  ED Management:  Elderly male with subjective fever and cough with a negative chest x-ray and a negative strep I will treat as a bronchitis.            Scribe Attestation:   Scribe #1: I performed the above scribed service and the documentation accurately describes the services I performed. I attest to the accuracy of the note.    This document was produced by a scribe under my direction and in my presence. I agree with the content of the note and have made any necessary edits.     Tano Roach MD    05/15/2019 8:11 PM           Clinical  Impression:     1. Bronchitis                                  Tano Roach MD  05/15/19 2011

## 2019-05-17 LAB — BACTERIA THROAT CULT: NORMAL

## 2019-08-08 ENCOUNTER — OFFICE VISIT (OUTPATIENT)
Dept: CARDIOLOGY | Facility: CLINIC | Age: 79
End: 2019-08-08
Attending: INTERNAL MEDICINE
Payer: MEDICARE

## 2019-08-08 VITALS
WEIGHT: 172 LBS | BODY MASS INDEX: 26.07 KG/M2 | HEIGHT: 68 IN | HEART RATE: 77 BPM | DIASTOLIC BLOOD PRESSURE: 62 MMHG | SYSTOLIC BLOOD PRESSURE: 118 MMHG

## 2019-08-08 DIAGNOSIS — Z87.39: ICD-10-CM

## 2019-08-08 DIAGNOSIS — I73.9 PERIPHERAL ARTERY DISEASE: ICD-10-CM

## 2019-08-08 DIAGNOSIS — I10 ESSENTIAL HYPERTENSION: ICD-10-CM

## 2019-08-08 DIAGNOSIS — Z86.73 HISTORY OF CEREBROVASCULAR ACCIDENT: ICD-10-CM

## 2019-08-08 DIAGNOSIS — I65.23 BILATERAL CAROTID ARTERY STENOSIS: ICD-10-CM

## 2019-08-08 DIAGNOSIS — I48.20 CHRONIC ATRIAL FIBRILLATION: ICD-10-CM

## 2019-08-08 DIAGNOSIS — Z79.01 CHRONIC ANTICOAGULATION: ICD-10-CM

## 2019-08-08 DIAGNOSIS — I50.42 HEART FAILURE, SYSTOLIC AND DIASTOLIC, CHRONIC: ICD-10-CM

## 2019-08-08 DIAGNOSIS — E78.00 HYPERCHOLESTEROLEMIA: ICD-10-CM

## 2019-08-08 PROCEDURE — 3078F DIAST BP <80 MM HG: CPT | Mod: CPTII,S$GLB,, | Performed by: INTERNAL MEDICINE

## 2019-08-08 PROCEDURE — 93000 ELECTROCARDIOGRAM COMPLETE: CPT | Mod: S$GLB,,, | Performed by: INTERNAL MEDICINE

## 2019-08-08 PROCEDURE — 3074F PR MOST RECENT SYSTOLIC BLOOD PRESSURE < 130 MM HG: ICD-10-PCS | Mod: CPTII,S$GLB,, | Performed by: INTERNAL MEDICINE

## 2019-08-08 PROCEDURE — 93000 PR ELECTROCARDIOGRAM, COMPLETE: ICD-10-PCS | Mod: S$GLB,,, | Performed by: INTERNAL MEDICINE

## 2019-08-08 PROCEDURE — 3078F PR MOST RECENT DIASTOLIC BLOOD PRESSURE < 80 MM HG: ICD-10-PCS | Mod: CPTII,S$GLB,, | Performed by: INTERNAL MEDICINE

## 2019-08-08 PROCEDURE — 99214 OFFICE O/P EST MOD 30 MIN: CPT | Mod: 25,S$GLB,, | Performed by: INTERNAL MEDICINE

## 2019-08-08 PROCEDURE — 3074F SYST BP LT 130 MM HG: CPT | Mod: CPTII,S$GLB,, | Performed by: INTERNAL MEDICINE

## 2019-08-08 PROCEDURE — 99214 PR OFFICE/OUTPT VISIT, EST, LEVL IV, 30-39 MIN: ICD-10-PCS | Mod: 25,S$GLB,, | Performed by: INTERNAL MEDICINE

## 2019-08-08 RX ORDER — LOSARTAN POTASSIUM 100 MG/1
100 TABLET ORAL DAILY
Start: 2019-08-08

## 2019-08-08 NOTE — PROGRESS NOTES
Subjective:     Alan Gutiérrez is a 78 y.o. male with hypertension and hypercholesterolemia. He is overweight. He has a history of a cerebrovascular accident in 2004 and then had right carotid endarterectomy. He was diagnosed with atrial fibrillation in 2010. He has a [id number] score of 7 ([id number]) being on apixiban. He has well compensated systolic as well as diastolic heart failure. He underwent left CEA on 9/12/2014. He has received DRAKE injection which has helped his back pain. He has been bothered by swelling of especially the left ankle which has improved after he began wrapping the legs. He presented with severe pain in the right knee on 1/7/2018 and had hemarthrosis of the right knee in the setting of having had an elevated INR and a knee injection. He later had the warfarin changed to apixiban. He denies any chest pain or dyspnea. No palpitations or weak spells. No bleeding. Feeling fair overall.      Congestive Heart Failure   Presents for follow-up visit. Pertinent negatives include no abdominal pain, chest pain, chest pressure, claudication, edema, fatigue, muscle weakness, near-syncope, nocturia, orthopnea, palpitations, paroxysmal nocturnal dyspnea, shortness of breath or unexpected weight change. The symptoms have been stable.   Atrial Fibrillation   Presents for follow-up visit. Symptoms include hypertension. Symptoms are negative for bradycardia, chest pain, dizziness, hemodynamic instability, hypotension, palpitations, shortness of breath, syncope, tachycardia and weakness. The symptoms have been stable. Past medical history includes atrial fibrillation, CHF and hyperlipidemia.   Cerebrovascular Accident   The current episode started today. The problem has been unchanged. Pertinent negatives include no abdominal pain, anorexia, arthralgias, change in bowel habit, chest pain, chills, congestion, coughing, diaphoresis, fatigue, fever, headaches, joint swelling, myalgias, nausea, neck pain,  numbness, rash, sore throat, swollen glands, urinary symptoms, vertigo, visual change, vomiting or weakness.   Hypertension   This is a chronic problem. The current episode started more than 1 year ago. The problem is unchanged. The problem is controlled (usually 130-140/70-85 mmHg at home). Pertinent negatives include no anxiety, blurred vision, chest pain, headaches, malaise/fatigue, neck pain, orthopnea, palpitations, peripheral edema, PND, shortness of breath or sweats. There is no history of chronic renal disease.   Hyperlipidemia   This is a chronic problem. The current episode started more than 1 year ago. The problem is controlled. Recent lipid tests were reviewed and are normal. He has no history of chronic renal disease, diabetes, hypothyroidism, liver disease, obesity or nephrotic syndrome. Pertinent negatives include no chest pain, focal sensory loss, focal weakness, leg pain, myalgias or shortness of breath.       Review of Systems   Constitution: Negative for chills, diaphoresis, fatigue, fever, malaise/fatigue and unexpected weight change.   HENT: Negative for congestion and sore throat.    Eyes: Negative for blurred vision, vision loss in left eye and vision loss in right eye.   Cardiovascular: Negative for chest pain, claudication, dyspnea on exertion, irregular heartbeat, near-syncope, orthopnea, palpitations, paroxysmal nocturnal dyspnea and syncope.   Respiratory: Negative for cough, shortness of breath and wheezing.    Endocrine: Negative for cold intolerance.   Hematologic/Lymphatic: Negative for bleeding problem. Does not bruise/bleed easily.   Skin: Negative for rash.   Musculoskeletal: Positive for joint pain (right knee). Negative for arthralgias, falls, gout, joint swelling, muscle weakness, myalgias and neck pain.   Gastrointestinal: Negative for abdominal pain, anorexia, change in bowel habit, hematochezia, hemorrhoids, jaundice, melena, nausea and vomiting.   Genitourinary: Negative for  frequency, hematuria and nocturia.   Neurological: Negative for dizziness, focal weakness, headaches, light-headedness, loss of balance, numbness, tremors, vertigo and weakness.   Psychiatric/Behavioral: Negative for altered mental status, depression and memory loss. The patient is not nervous/anxious.    Allergic/Immunologic: Negative for hives and persistent infections.       Current Outpatient Medications on File Prior to Visit   Medication Sig Dispense Refill    albuterol (PROVENTIL/VENTOLIN HFA) 90 mcg/actuation inhaler Inhale 2 puffs into the lungs every 6 (six) hours as needed for Wheezing. Rescue 6.7 g 0    allopurinol (ZYLOPRIM) 100 MG tablet Take 100 mg by mouth once daily.      apixaban (ELIQUIS) 5 mg Tab Take 1 tablet (5 mg total) by mouth 2 (two) times daily. 180 tablet 3    azithromycin (Z-SYDNEE) 250 MG tablet Take 2 tablets by mouth on day 1; Take 1 tablet by mouth on days 2-5 6 tablet 0    calcium citrate (CALCITRATE) 200 mg (950 mg) tablet Take 1 tablet by mouth once daily.      cloNIDine (CATAPRES) 0.1 MG tablet take one tablet by mouth every 8 hours at 7 am, 1 pm, and 9 pm  1    digoxin (LANOXIN) 125 mcg tablet Take 1 tablet (125 mcg total) by mouth once daily. 90 tablet 3    finasteride (PROSCAR) 5 mg tablet Take 5 mg by mouth once daily.      furosemide (LASIX) 40 MG tablet Take 1 tablet (40 mg total) by mouth once daily. 30 tablet 11    metoprolol succinate (TOPROL-XL) 50 MG 24 hr tablet Take 1 tablet (50 mg total) by mouth once daily. 90 tablet 3    NIFEdipine (PROCARDIA-XL) 30 MG (OSM) 24 hr tablet Take 30 mg by mouth every morning.      NIFEdipine (PROCARDIA-XL) 60 MG (OSM) 24 hr tablet Take 60 mg by mouth every evening.      olmesartan (BENICAR) 40 MG tablet Take 40 mg by mouth once daily.      oxyCODONE-acetaminophen (PERCOCET) 5-325 mg per tablet 1 tab every 4 hours PRN pain or 2 tablets every 6 hours PRN pain 90 tablet 0    potassium chloride (MICRO-K) 10 MEQ CpSR Take 10  "mEq by mouth once daily.       rosuvastatin (CRESTOR) 40 MG Tab Take 40 mg by mouth once daily.      vitamin D 1000 units Tab Take 185 mg by mouth once daily.       No current facility-administered medications on file prior to visit.        /62   Pulse 77   Ht 5' 8" (1.727 m)   Wt 78 kg (172 lb)   BMI 26.15 kg/m²       Objective:     Physical Exam   Constitutional: He is oriented to person, place, and time. He appears well-developed and well-nourished. He does not appear ill. No distress.   HENT:   Head: Normocephalic and atraumatic.   Nose: Nose normal.   Eyes: Right eye exhibits no discharge. Left eye exhibits no discharge. Right conjunctiva is not injected. Left conjunctiva is not injected. Right pupil is round. Left pupil is round. Pupils are equal.   Neck: Neck supple. No JVD present. Carotid bruit is not present. No thyromegaly present.   Cardiovascular: Normal rate, S1 normal and S2 normal. An irregularly irregular rhythm present.  No extrasystoles are present. PMI is not displaced.   Murmur heard.  High-pitched blowing holosystolic murmur is present with a grade of 2/6 at the apex.  Pulses:       Radial pulses are 2+ on the right side, and 2+ on the left side.        Femoral pulses are 2+ on the right side, and 2+ on the left side.       Dorsalis pedis pulses are 2+ on the right side, and 1+ on the left side.        Posterior tibial pulses are 2+ on the right side, and 0 on the left side.   Pulmonary/Chest: Effort normal and breath sounds normal.   Abdominal: Soft. Normal appearance. There is no hepatosplenomegaly. There is no tenderness.   Musculoskeletal:        Right ankle: He exhibits swelling. He exhibits no ecchymosis and no deformity.        Left ankle: He exhibits swelling. He exhibits no ecchymosis and no deformity.   Lymphadenopathy:        Head (right side): No submandibular adenopathy present.        Head (left side): No submandibular adenopathy present.     He has no cervical " adenopathy.   Neurological: He is alert and oriented to person, place, and time. He is not disoriented. No cranial nerve deficit or sensory deficit.   Skin: Skin is warm, dry and intact. No rash noted. He is not diaphoretic.   Psychiatric: He has a normal mood and affect. His speech is normal and behavior is normal. Judgment and thought content normal. Cognition and memory are normal.       Assessment:     1. Heart failure, systolic and diastolic, chronic    2. Chronic atrial fibrillation    3. Chronic anticoagulation    4. Bilateral carotid artery stenosis    5. History of cerebrovascular accident    6. Peripheral artery disease    7. Essential hypertension    8. Hypercholesterolemia    9. History of hemarthrosis        Plan:     1. Heart Failure, Systolic & Diastolic, Chronic              8/1/2016: Echo: Normal left ventricular size with mild systolic dysfunction. EF 45%. Markedly dilated LA. Moderate aortic valve sclerosis - 1.6 m/s.              1/8/2018: Normal left ventricular size with low normal systolic dysfunction. EF 50-55%. Anteroseptal wall mildly hypokinetic. Mild LVH. Markedly dilated LA. Moderate aortic valve sclerosis - 1.5 m/s.    Off ramipril 10 mg Q24 due to dry cough.   On metoprolol 50 mg Q24, losartan 100 mg Q24, furosemide 40 mg Q24 and KCl 10 mEq Q24.   May take extra furosemide 40 mg Q24 pm if needed for leg edema or fluid overload.   Previously seen edema of ankles that is well controlled with pressure stockings.              Appears reasonably well compensated.          2. Atrial Fibrillation              2010: Diagnosed with chronic atrial fibrillation.              [id number]=2 (PTE4G3V)              On apixiban.              1/23/2018: Holter: Atrial fibrillation 81 () bpm. Briefly in 30s at night. Longest RR 2.1 sec.   On metoprolol 50 mg Q24 and digoxin 0.125 mg Q24.    Rate well controlled.     3. Chronic Anticoagulation              2010: Diagnosed with chronic atrial  fibrillation.              [id number]=3 (ZGN0P4H).              1/13/2018: Began apixiban.   On apixiban 5 mg Q12.              No aspirin or NSAID.   No bleeding.                 4. Carotid Artery Stenosis              2004: Right CEA.              8/26/2014: Carotid Duplex: DIANNA: Mild. LICA: Critical - 3.4 m/s.              9/11/2014: [facility] [place]: 4V: DIANNA: Mild. Left Vert: 90%. LICA: 95%.              9/2014: Left CEA.              12/1/2016: Carotid Duplex: Moderate plaquing.              12/13/2017: Carotid Duplex: Moderate plaquing.   12/10/2018: Carotid Duplex: Moderate plaquing.   No need for aspirin as anticoagulated with warfarin.              12/2019: Plan next Carotid Duplex. Then yearly.     5. History of Cerebrovascular Accident              5/2004: CVA. Left sided hemiplegia. Received tPA. Good recovery.              7/2004: Left temporal CVA. Weak.     6. Peripheral Artery Disease              10/25/2016: Arterial Duplex: Right: SFA: Distal 1.8 m/s. Left: No obstructive disease above knee.              Asymptomatic.     7. Hypertension              1990: Diagnosed.               On metoprolol 50 mg Q24, clonidine 0.1 mg Q8, nifedipine 60 mg Q12, losartan 100 mg Q24, furosemide 40 mg Q24 and KCl 10 mEq Q24.   Keeping log at home              Well controlled overall.     8. Hypercholesterolemia              2004: Began statin.              On rosuvastatin 40 mg Q24.              Tells me well controlled.     9. History of Knee Replacement   1/5/2018: Right knee pain became severe.   3/9/2018: Right knee replacement.              Dr. Nicholas Gonzalez.    10. Hyperuricemia   2008: Diagnosed.   On allopurinol 100 mg Q24    11. Primary Care   Dr. Dave Luna Jr..    F/u 4 months.    Zachary Pressley M.D.      12/4/2019 7:48 PM, Addendum:    12/4/2019: Carotid Duplex: Moderate plaquing.    I discussed the above test result and the implications of the findings over the phone.    Zachary Pressley M.D.

## 2019-10-22 DIAGNOSIS — I50.42 HEART FAILURE, SYSTOLIC AND DIASTOLIC, CHRONIC: Primary | ICD-10-CM

## 2019-10-22 DIAGNOSIS — I48.20 CHRONIC ATRIAL FIBRILLATION: ICD-10-CM

## 2019-10-22 DIAGNOSIS — I65.23 BILATERAL CAROTID ARTERY STENOSIS: ICD-10-CM

## 2019-12-04 ENCOUNTER — CLINICAL SUPPORT (OUTPATIENT)
Dept: CARDIOLOGY | Facility: CLINIC | Age: 79
End: 2019-12-04
Attending: INTERNAL MEDICINE
Payer: MEDICARE

## 2019-12-04 LAB
LEFT CBA DIAS: 19 CM/S
LEFT CBA SYS: 80 CM/S
LEFT CCA DIST DIAS: 25 CM/S
LEFT CCA DIST SYS: 86 CM/S
LEFT CCA MID DIAS: 27 CM/S
LEFT CCA MID SYS: 89 CM/S
LEFT CCA PROX DIAS: 30 CM/S
LEFT CCA PROX SYS: 125 CM/S
LEFT ECA DIAS: 26 CM/S
LEFT ECA SYS: 130 CM/S
LEFT ICA DIST DIAS: 36 CM/S
LEFT ICA DIST SYS: 73 CM/S
LEFT ICA MID DIAS: 41 CM/S
LEFT ICA MID SYS: 83 CM/S
LEFT ICA PROX DIAS: 32 CM/S
LEFT ICA PROX SYS: 62 CM/S
LEFT VERTEBRAL DIAS: 13 CM/S
LEFT VERTEBRAL SYS: 24 CM/S
OHS CV CAROTID RIGHT ICA EDV HIGHEST: 34
OHS CV CAROTID ULTRASOUND LEFT ICA/CCA RATIO: 0.66
OHS CV CAROTID ULTRASOUND RIGHT ICA/CCA RATIO: 0.95
OHS CV PV CAROTID LEFT HIGHEST CCA: 125
OHS CV PV CAROTID LEFT HIGHEST ICA: 83
OHS CV PV CAROTID RIGHT HIGHEST CCA: 80
OHS CV PV CAROTID RIGHT HIGHEST ICA: 76
OHS CV US CAROTID LEFT HIGHEST EDV: 41
RIGHT CBA DIAS: 21 CM/S
RIGHT CBA SYS: 83 CM/S
RIGHT CCA DIST DIAS: 26 CM/S
RIGHT CCA DIST SYS: 77 CM/S
RIGHT CCA MID DIAS: 30 CM/S
RIGHT CCA MID SYS: 80 CM/S
RIGHT CCA PROX DIAS: 24 CM/S
RIGHT CCA PROX SYS: 70 CM/S
RIGHT ECA DIAS: 25 CM/S
RIGHT ECA SYS: 97 CM/S
RIGHT ICA DIST DIAS: 26 CM/S
RIGHT ICA DIST SYS: 62 CM/S
RIGHT ICA MID DIAS: 30 CM/S
RIGHT ICA MID SYS: 69 CM/S
RIGHT ICA PROX DIAS: 34 CM/S
RIGHT ICA PROX SYS: 76 CM/S
RIGHT VERTEBRAL DIAS: 19 CM/S
RIGHT VERTEBRAL SYS: 43 CM/S

## 2019-12-12 ENCOUNTER — OFFICE VISIT (OUTPATIENT)
Dept: CARDIOLOGY | Facility: CLINIC | Age: 79
End: 2019-12-12
Attending: INTERNAL MEDICINE
Payer: MEDICARE

## 2019-12-12 VITALS
HEIGHT: 68 IN | SYSTOLIC BLOOD PRESSURE: 125 MMHG | HEART RATE: 56 BPM | DIASTOLIC BLOOD PRESSURE: 74 MMHG | WEIGHT: 170 LBS | BODY MASS INDEX: 25.76 KG/M2

## 2019-12-12 DIAGNOSIS — I50.42 HEART FAILURE, SYSTOLIC AND DIASTOLIC, CHRONIC: ICD-10-CM

## 2019-12-12 DIAGNOSIS — I10 ESSENTIAL HYPERTENSION: ICD-10-CM

## 2019-12-12 DIAGNOSIS — I65.23 BILATERAL CAROTID ARTERY STENOSIS: ICD-10-CM

## 2019-12-12 DIAGNOSIS — Z79.01 CHRONIC ANTICOAGULATION: ICD-10-CM

## 2019-12-12 DIAGNOSIS — Z86.73 HISTORY OF CEREBROVASCULAR ACCIDENT: ICD-10-CM

## 2019-12-12 DIAGNOSIS — E78.00 HYPERCHOLESTEROLEMIA: ICD-10-CM

## 2019-12-12 DIAGNOSIS — I48.21 PERMANENT ATRIAL FIBRILLATION: ICD-10-CM

## 2019-12-12 DIAGNOSIS — Z87.39: ICD-10-CM

## 2019-12-12 DIAGNOSIS — I73.9 PERIPHERAL ARTERY DISEASE: ICD-10-CM

## 2019-12-12 PROCEDURE — 1159F PR MEDICATION LIST DOCUMENTED IN MEDICAL RECORD: ICD-10-PCS | Mod: S$GLB,,, | Performed by: INTERNAL MEDICINE

## 2019-12-12 PROCEDURE — 99214 PR OFFICE/OUTPT VISIT, EST, LEVL IV, 30-39 MIN: ICD-10-PCS | Mod: S$GLB,,, | Performed by: INTERNAL MEDICINE

## 2019-12-12 PROCEDURE — 93000 PR ELECTROCARDIOGRAM, COMPLETE: ICD-10-PCS | Mod: S$GLB,,, | Performed by: INTERNAL MEDICINE

## 2019-12-12 PROCEDURE — 99214 OFFICE O/P EST MOD 30 MIN: CPT | Mod: S$GLB,,, | Performed by: INTERNAL MEDICINE

## 2019-12-12 PROCEDURE — 3074F PR MOST RECENT SYSTOLIC BLOOD PRESSURE < 130 MM HG: ICD-10-PCS | Mod: CPTII,S$GLB,, | Performed by: INTERNAL MEDICINE

## 2019-12-12 PROCEDURE — 3078F DIAST BP <80 MM HG: CPT | Mod: CPTII,S$GLB,, | Performed by: INTERNAL MEDICINE

## 2019-12-12 PROCEDURE — 1159F MED LIST DOCD IN RCRD: CPT | Mod: S$GLB,,, | Performed by: INTERNAL MEDICINE

## 2019-12-12 PROCEDURE — 3074F SYST BP LT 130 MM HG: CPT | Mod: CPTII,S$GLB,, | Performed by: INTERNAL MEDICINE

## 2019-12-12 PROCEDURE — 3078F PR MOST RECENT DIASTOLIC BLOOD PRESSURE < 80 MM HG: ICD-10-PCS | Mod: CPTII,S$GLB,, | Performed by: INTERNAL MEDICINE

## 2019-12-12 PROCEDURE — 93000 ELECTROCARDIOGRAM COMPLETE: CPT | Mod: S$GLB,,, | Performed by: INTERNAL MEDICINE

## 2019-12-12 NOTE — PROGRESS NOTES
Subjective:     Alan Gutiérrez is a 79 y.o. male with hypertension and hypercholesterolemia. He is overweight. He has a history of a cerebrovascular accident in 2004 and then had right carotid endarterectomy. He was diagnosed with atrial fibrillation in 2010. He has a BMB4BL5VFIu score of 7 (YCN6V9L) being on apixiban. He has well compensated systolic as well as diastolic heart failure. He underwent left CEA on 9/12/2014. He has received DRAKE injection which has helped his back pain. He has been bothered by swelling of especially the left ankle which has improved after he began wrapping the legs. He presented with severe pain in the right knee on 1/7/2018 and had hemarthrosis of the right knee in the setting of having had an elevated INR and a knee injection. He later had the warfarin changed to apixiban. He denies any chest pain or dyspnea. No palpitations or weak spells. No bleeding. Feeling fair overall.      Congestive Heart Failure   Presents for follow-up visit. Pertinent negatives include no abdominal pain, chest pain, chest pressure, claudication, edema, fatigue, muscle weakness, near-syncope, nocturia, orthopnea, palpitations, paroxysmal nocturnal dyspnea, shortness of breath or unexpected weight change. The symptoms have been stable.   Atrial Fibrillation   Presents for follow-up visit. Symptoms include hypertension. Symptoms are negative for bradycardia, chest pain, dizziness, hemodynamic instability, hypotension, palpitations, shortness of breath, syncope, tachycardia and weakness. The symptoms have been stable. Past medical history includes atrial fibrillation, CHF and hyperlipidemia.   Cerebrovascular Accident   The current episode started today. The problem has been unchanged. Pertinent negatives include no abdominal pain, anorexia, arthralgias, change in bowel habit, chest pain, chills, congestion, coughing, diaphoresis, fatigue, fever, headaches, joint swelling, myalgias, nausea, neck pain,  numbness, rash, sore throat, swollen glands, urinary symptoms, vertigo, visual change, vomiting or weakness.   Hypertension   This is a chronic problem. The current episode started more than 1 year ago. The problem is unchanged. The problem is controlled (usually 130-140/70-85 mmHg at home). Pertinent negatives include no anxiety, blurred vision, chest pain, headaches, malaise/fatigue, neck pain, orthopnea, palpitations, peripheral edema, PND, shortness of breath or sweats. There is no history of chronic renal disease.   Hyperlipidemia   This is a chronic problem. The current episode started more than 1 year ago. The problem is controlled. Recent lipid tests were reviewed and are normal. He has no history of chronic renal disease, diabetes, hypothyroidism, liver disease, obesity or nephrotic syndrome. Pertinent negatives include no chest pain, focal sensory loss, focal weakness, leg pain, myalgias or shortness of breath.       Review of Systems   Constitution: Negative for chills, diaphoresis, fatigue, fever, malaise/fatigue and unexpected weight change.   HENT: Negative for congestion and sore throat.    Eyes: Negative for blurred vision, vision loss in left eye and vision loss in right eye.   Cardiovascular: Negative for chest pain, claudication, dyspnea on exertion, irregular heartbeat, near-syncope, orthopnea, palpitations, paroxysmal nocturnal dyspnea and syncope.   Respiratory: Negative for cough, shortness of breath and wheezing.    Endocrine: Negative for cold intolerance.   Hematologic/Lymphatic: Negative for bleeding problem. Does not bruise/bleed easily.   Skin: Negative for rash.   Musculoskeletal: Positive for joint pain (right knee). Negative for arthralgias, falls, gout, joint swelling, muscle weakness, myalgias and neck pain.   Gastrointestinal: Negative for abdominal pain, anorexia, change in bowel habit, hematochezia, hemorrhoids, jaundice, melena, nausea and vomiting.   Genitourinary: Negative for  frequency, hematuria and nocturia.   Neurological: Negative for dizziness, focal weakness, headaches, light-headedness, loss of balance, numbness, tremors, vertigo and weakness.   Psychiatric/Behavioral: Negative for altered mental status, depression and memory loss. The patient is not nervous/anxious.    Allergic/Immunologic: Negative for hives and persistent infections.       Current Outpatient Medications on File Prior to Visit   Medication Sig Dispense Refill    albuterol (PROVENTIL/VENTOLIN HFA) 90 mcg/actuation inhaler Inhale 2 puffs into the lungs every 6 (six) hours as needed for Wheezing. Rescue 6.7 g 0    allopurinol (ZYLOPRIM) 100 MG tablet Take 100 mg by mouth once daily.      apixaban (ELIQUIS) 5 mg Tab Take 1 tablet (5 mg total) by mouth 2 (two) times daily. 180 tablet 3    azithromycin (Z-SYDNEE) 250 MG tablet Take 2 tablets by mouth on day 1; Take 1 tablet by mouth on days 2-5 6 tablet 0    calcium citrate (CALCITRATE) 200 mg (950 mg) tablet Take 1 tablet by mouth once daily.      cloNIDine (CATAPRES) 0.1 MG tablet take one tablet by mouth every 8 hours at 7 am, 1 pm, and 9 pm  1    digoxin (LANOXIN) 125 mcg tablet Take 1 tablet (125 mcg total) by mouth once daily. 90 tablet 3    finasteride (PROSCAR) 5 mg tablet Take 5 mg by mouth once daily.      furosemide (LASIX) 40 MG tablet Take 1 tablet (40 mg total) by mouth once daily. 30 tablet 11    losartan (COZAAR) 100 MG tablet Take 1 tablet (100 mg total) by mouth once daily.      metoprolol succinate (TOPROL-XL) 50 MG 24 hr tablet Take 1 tablet (50 mg total) by mouth once daily. 90 tablet 3    NIFEdipine (PROCARDIA-XL) 30 MG (OSM) 24 hr tablet Take 30 mg by mouth every morning.      NIFEdipine (PROCARDIA-XL) 60 MG (OSM) 24 hr tablet Take 60 mg by mouth every evening.      oxyCODONE-acetaminophen (PERCOCET) 5-325 mg per tablet 1 tab every 4 hours PRN pain or 2 tablets every 6 hours PRN pain 90 tablet 0    potassium chloride (MICRO-K) 10 MEQ  "CpSR Take 10 mEq by mouth once daily.       rosuvastatin (CRESTOR) 40 MG Tab Take 40 mg by mouth once daily.      vitamin D 1000 units Tab Take 185 mg by mouth once daily.      [DISCONTINUED] olmesartan (BENICAR) 40 MG tablet Take 40 mg by mouth once daily.       No current facility-administered medications on file prior to visit.        /74   Pulse (!) 56   Ht 5' 8" (1.727 m)   Wt 77.1 kg (170 lb)   BMI 25.85 kg/m²       Objective:     Physical Exam   Constitutional: He is oriented to person, place, and time. He appears well-developed and well-nourished. He does not appear ill. No distress.   HENT:   Head: Normocephalic and atraumatic.   Nose: Nose normal.   Eyes: Right eye exhibits no discharge. Left eye exhibits no discharge. Right conjunctiva is not injected. Left conjunctiva is not injected. Right pupil is round. Left pupil is round. Pupils are equal.   Neck: Neck supple. No JVD present. Carotid bruit is not present. No thyromegaly present.   Cardiovascular: S1 normal and S2 normal. An irregularly irregular rhythm present.  No extrasystoles are present. Bradycardia present. PMI is not displaced.   Murmur heard.  High-pitched blowing holosystolic murmur is present with a grade of 2/6 at the apex.  Pulses:       Radial pulses are 2+ on the right side, and 2+ on the left side.        Femoral pulses are 2+ on the right side, and 2+ on the left side.       Dorsalis pedis pulses are 2+ on the right side, and 1+ on the left side.        Posterior tibial pulses are 2+ on the right side, and 0 on the left side.   Pulmonary/Chest: Effort normal and breath sounds normal.   Abdominal: Soft. Normal appearance. There is no hepatosplenomegaly. There is no tenderness.   Musculoskeletal:        Right ankle: He exhibits swelling. He exhibits no ecchymosis and no deformity.        Left ankle: He exhibits swelling. He exhibits no ecchymosis and no deformity.   Lymphadenopathy:        Head (right side): No " submandibular adenopathy present.        Head (left side): No submandibular adenopathy present.     He has no cervical adenopathy.   Neurological: He is alert and oriented to person, place, and time. He is not disoriented. No cranial nerve deficit or sensory deficit.   Skin: Skin is warm, dry and intact. No rash noted. He is not diaphoretic.   Psychiatric: He has a normal mood and affect. His speech is normal and behavior is normal. Judgment and thought content normal. Cognition and memory are normal.       Assessment:     1. Heart failure, systolic and diastolic, chronic    2. Permanent atrial fibrillation    3. Chronic anticoagulation    4. Bilateral carotid artery stenosis    5. History of cerebrovascular accident    6. Peripheral artery disease    7. Essential hypertension    8. Hypercholesterolemia    9. History of hemarthrosis        Plan:     1. Heart Failure, Systolic & Diastolic, Chronic              8/1/2016: Echo: Normal left ventricular size with mild systolic dysfunction. EF 45%. Markedly dilated LA. Moderate aortic valve sclerosis - 1.6 m/s.              1/8/2018: Normal left ventricular size with low normal systolic dysfunction. EF 50-55%. Anteroseptal wall mildly hypokinetic. Mild LVH. Markedly dilated LA. Moderate aortic valve sclerosis - 1.5 m/s.    Off ramipril 10 mg Q24 due to dry cough.   On metoprolol 50 mg Q24, losartan 100 mg Q24, furosemide 40 mg Q24 and KCl 10 mEq Q24.   May take extra furosemide 40 mg Q24 pm if needed for leg edema or fluid overload.   Previously seen edema of ankles that is well controlled with pressure stockings.              Appears reasonably well compensated.          2. Atrial Fibrillation              2010: Diagnosed with chronic atrial fibrillation.              [id number]=1 (NHG6J2S)              On apixiban.              1/23/2018: Holter: Atrial fibrillation 81 () bpm. Briefly in 30s at night. Longest RR 2.1 sec.   On metoprolol 50 mg Q24 and digoxin  0.125 mg Q24.    12/12/2019: Rate appears on slow side at 54 bpm. Stop digoxin.   Follow VRR.      3. Chronic Anticoagulation              2010: Diagnosed with chronic atrial fibrillation.              [id number]=7 ([id number]).              1/13/2018: Began apixiban.   On apixiban 5 mg Q12.              No aspirin or NSAID.   No bleeding.                 4. Carotid Artery Stenosis              2004: Right CEA.              8/26/2014: Carotid Duplex: DIANNA: Mild. LICA: Critical - 3.4 m/s.              9/11/2014: [facility] [place]: 4V: DIANNA: Mild. Left Vert: 90%. LICA: 95%.              9/2014: Left CEA.              12/1/2016: Carotid Duplex: Moderate plaquing.              12/13/2017: Carotid Duplex: Moderate plaquing.   12/10/2018: Carotid Duplex: Moderate plaquing.   12/4/2019: Carotid Duplex: Moderate plaquing.   No need for aspirin as anticoagulated with warfarin.              12/2020: Plan next Carotid Duplex. Then yearly.     5. History of Cerebrovascular Accident              5/2004: CVA. Left sided hemiplegia. Received tPA. Good recovery.              7/2004: Left temporal CVA. Weak.     6. Peripheral Artery Disease              10/25/2016: Arterial Duplex: Right: SFA: Distal 1.8 m/s. Left: No obstructive disease above knee.              Asymptomatic.     7. Hypertension              1990: Diagnosed.               On metoprolol 50 mg Q24, clonidine 0.1 mg Q8, nifedipine 60 mg Q12, losartan 100 mg Q24, furosemide 40 mg Q24 and KCl 10 mEq Q24.   Keeping log at home              Well controlled overall.     8. Hypercholesterolemia              2004: Began statin.              On rosuvastatin 40 mg Q24.              Tells me well controlled.     9. History of Knee Replacement   1/5/2018: Right knee pain became severe.   3/9/2018: Right knee replacement.              Dr. Nicholas Gonzalez.    10. Hyperuricemia   2008: Diagnosed.   On allopurinol 100 mg Q24    11. Primary Care   Dr. Dave Luna Jr..    F/u 2 months.    Zachary Pressley  M.D.

## 2020-02-13 ENCOUNTER — OFFICE VISIT (OUTPATIENT)
Dept: CARDIOLOGY | Facility: CLINIC | Age: 80
End: 2020-02-13
Attending: INTERNAL MEDICINE
Payer: MEDICARE

## 2020-02-13 VITALS
HEIGHT: 68 IN | DIASTOLIC BLOOD PRESSURE: 66 MMHG | WEIGHT: 168 LBS | BODY MASS INDEX: 25.46 KG/M2 | SYSTOLIC BLOOD PRESSURE: 118 MMHG | HEART RATE: 66 BPM

## 2020-02-13 DIAGNOSIS — Z79.01 CHRONIC ANTICOAGULATION: ICD-10-CM

## 2020-02-13 DIAGNOSIS — I10 ESSENTIAL HYPERTENSION: ICD-10-CM

## 2020-02-13 DIAGNOSIS — Z86.73 HISTORY OF CEREBROVASCULAR ACCIDENT: ICD-10-CM

## 2020-02-13 DIAGNOSIS — I50.42 HEART FAILURE, SYSTOLIC AND DIASTOLIC, CHRONIC: ICD-10-CM

## 2020-02-13 DIAGNOSIS — E78.00 HYPERCHOLESTEROLEMIA: ICD-10-CM

## 2020-02-13 DIAGNOSIS — I48.21 PERMANENT ATRIAL FIBRILLATION: ICD-10-CM

## 2020-02-13 DIAGNOSIS — I73.9 PERIPHERAL ARTERY DISEASE: ICD-10-CM

## 2020-02-13 DIAGNOSIS — Z87.39: ICD-10-CM

## 2020-02-13 DIAGNOSIS — I65.23 BILATERAL CAROTID ARTERY STENOSIS: ICD-10-CM

## 2020-02-13 PROCEDURE — 93000 ELECTROCARDIOGRAM COMPLETE: CPT | Mod: S$GLB,,, | Performed by: INTERNAL MEDICINE

## 2020-02-13 PROCEDURE — 1159F MED LIST DOCD IN RCRD: CPT | Mod: S$GLB,,, | Performed by: INTERNAL MEDICINE

## 2020-02-13 PROCEDURE — 93000 PR ELECTROCARDIOGRAM, COMPLETE: ICD-10-PCS | Mod: S$GLB,,, | Performed by: INTERNAL MEDICINE

## 2020-02-13 PROCEDURE — 99214 PR OFFICE/OUTPT VISIT, EST, LEVL IV, 30-39 MIN: ICD-10-PCS | Mod: 25,S$GLB,, | Performed by: INTERNAL MEDICINE

## 2020-02-13 PROCEDURE — 3074F PR MOST RECENT SYSTOLIC BLOOD PRESSURE < 130 MM HG: ICD-10-PCS | Mod: CPTII,S$GLB,, | Performed by: INTERNAL MEDICINE

## 2020-02-13 PROCEDURE — 1159F PR MEDICATION LIST DOCUMENTED IN MEDICAL RECORD: ICD-10-PCS | Mod: S$GLB,,, | Performed by: INTERNAL MEDICINE

## 2020-02-13 PROCEDURE — 3074F SYST BP LT 130 MM HG: CPT | Mod: CPTII,S$GLB,, | Performed by: INTERNAL MEDICINE

## 2020-02-13 PROCEDURE — 99214 OFFICE O/P EST MOD 30 MIN: CPT | Mod: 25,S$GLB,, | Performed by: INTERNAL MEDICINE

## 2020-02-13 PROCEDURE — 3078F DIAST BP <80 MM HG: CPT | Mod: CPTII,S$GLB,, | Performed by: INTERNAL MEDICINE

## 2020-02-13 PROCEDURE — 3078F PR MOST RECENT DIASTOLIC BLOOD PRESSURE < 80 MM HG: ICD-10-PCS | Mod: CPTII,S$GLB,, | Performed by: INTERNAL MEDICINE

## 2020-02-13 NOTE — PROGRESS NOTES
Subjective:     Alan Gutiérrez is a 79 y.o. male with hypertension and hypercholesterolemia. He is overweight. He has a history of a cerebrovascular accident in 2004 and then had right carotid endarterectomy. He was diagnosed with atrial fibrillation in 2010. He has a [id number] score of 7 ([id number]) being on apixiban. He has well compensated systolic as well as diastolic heart failure. He underwent left CEA on 9/12/2014. He has received DRAKE injection which has helped his back pain. He has been bothered by swelling of especially the left ankle which has improved after he began wrapping the legs. He presented with severe pain in the right knee on 1/7/2018 and had hemarthrosis of the right knee in the setting of having had an elevated INR and a knee injection. He later had the warfarin changed to apixiban. He denies any chest pain or dyspnea. No palpitations or weak spells. No bleeding. Feeling fair overall.      Congestive Heart Failure   Presents for follow-up visit. Pertinent negatives include no abdominal pain, chest pain, chest pressure, claudication, edema, fatigue, muscle weakness, near-syncope, nocturia, orthopnea, palpitations, paroxysmal nocturnal dyspnea, shortness of breath or unexpected weight change. The symptoms have been stable.   Atrial Fibrillation   Presents for follow-up visit. Symptoms include hypertension. Symptoms are negative for bradycardia, chest pain, dizziness, hemodynamic instability, hypotension, palpitations, shortness of breath, syncope, tachycardia and weakness. The symptoms have been stable. Past medical history includes atrial fibrillation, CHF and hyperlipidemia.   Cerebrovascular Accident   The current episode started today. The problem has been unchanged. Pertinent negatives include no abdominal pain, anorexia, arthralgias, change in bowel habit, chest pain, chills, congestion, coughing, diaphoresis, fatigue, fever, headaches, joint swelling, myalgias, nausea, neck pain,  numbness, rash, sore throat, swollen glands, urinary symptoms, vertigo, visual change, vomiting or weakness.   Hypertension   This is a chronic problem. The current episode started more than 1 year ago. The problem is unchanged. The problem is controlled (usually 130-140/70-85 mmHg at home). Pertinent negatives include no anxiety, blurred vision, chest pain, headaches, malaise/fatigue, neck pain, orthopnea, palpitations, peripheral edema, PND, shortness of breath or sweats. There is no history of chronic renal disease.   Hyperlipidemia   This is a chronic problem. The current episode started more than 1 year ago. The problem is controlled. Recent lipid tests were reviewed and are normal. He has no history of chronic renal disease, diabetes, hypothyroidism, liver disease, obesity or nephrotic syndrome. Pertinent negatives include no chest pain, focal sensory loss, focal weakness, leg pain, myalgias or shortness of breath.       Review of Systems   Constitution: Negative for chills, diaphoresis, fatigue, fever, malaise/fatigue and unexpected weight change.   HENT: Negative for congestion and sore throat.    Eyes: Negative for blurred vision, vision loss in left eye and vision loss in right eye.   Cardiovascular: Negative for chest pain, claudication, dyspnea on exertion, irregular heartbeat, near-syncope, orthopnea, palpitations, paroxysmal nocturnal dyspnea and syncope.   Respiratory: Negative for cough, shortness of breath and wheezing.    Endocrine: Negative for cold intolerance.   Hematologic/Lymphatic: Negative for bleeding problem. Does not bruise/bleed easily.   Skin: Negative for rash.   Musculoskeletal: Positive for joint pain (right knee). Negative for arthralgias, falls, gout, joint swelling, muscle weakness, myalgias and neck pain.   Gastrointestinal: Negative for abdominal pain, anorexia, change in bowel habit, hematochezia, hemorrhoids, jaundice, melena, nausea and vomiting.   Genitourinary: Negative for  frequency, hematuria and nocturia.   Neurological: Negative for dizziness, focal weakness, headaches, light-headedness, loss of balance, numbness, tremors, vertigo and weakness.   Psychiatric/Behavioral: Negative for altered mental status, depression and memory loss. The patient is not nervous/anxious.    Allergic/Immunologic: Negative for hives and persistent infections.       Current Outpatient Medications on File Prior to Visit   Medication Sig Dispense Refill    albuterol (PROVENTIL/VENTOLIN HFA) 90 mcg/actuation inhaler Inhale 2 puffs into the lungs every 6 (six) hours as needed for Wheezing. Rescue 6.7 g 0    allopurinol (ZYLOPRIM) 100 MG tablet Take 100 mg by mouth once daily.      apixaban (ELIQUIS) 5 mg Tab Take 1 tablet (5 mg total) by mouth 2 (two) times daily. 180 tablet 3    azithromycin (Z-SYDNEE) 250 MG tablet Take 2 tablets by mouth on day 1; Take 1 tablet by mouth on days 2-5 6 tablet 0    calcium citrate (CALCITRATE) 200 mg (950 mg) tablet Take 1 tablet by mouth once daily.      cloNIDine (CATAPRES) 0.1 MG tablet take one tablet by mouth every 8 hours at 7 am, 1 pm, and 9 pm  1    finasteride (PROSCAR) 5 mg tablet Take 5 mg by mouth once daily.      furosemide (LASIX) 40 MG tablet Take 1 tablet (40 mg total) by mouth once daily. 30 tablet 11    losartan (COZAAR) 100 MG tablet Take 1 tablet (100 mg total) by mouth once daily.      metoprolol succinate (TOPROL-XL) 50 MG 24 hr tablet Take 1 tablet (50 mg total) by mouth once daily. 90 tablet 3    NIFEdipine (PROCARDIA-XL) 30 MG (OSM) 24 hr tablet Take 30 mg by mouth every morning.      NIFEdipine (PROCARDIA-XL) 60 MG (OSM) 24 hr tablet Take 60 mg by mouth every evening.      oxyCODONE-acetaminophen (PERCOCET) 5-325 mg per tablet 1 tab every 4 hours PRN pain or 2 tablets every 6 hours PRN pain 90 tablet 0    potassium chloride (MICRO-K) 10 MEQ CpSR Take 10 mEq by mouth once daily.       rosuvastatin (CRESTOR) 40 MG Tab Take 40 mg by mouth  "once daily.      vitamin D 1000 units Tab Take 185 mg by mouth once daily.      [DISCONTINUED] olmesartan (BENICAR) 40 MG tablet Take 40 mg by mouth once daily.       No current facility-administered medications on file prior to visit.        /66   Pulse 66   Ht 5' 8" (1.727 m)   Wt 76.2 kg (168 lb)   BMI 25.54 kg/m²       Objective:     Physical Exam   Constitutional: He is oriented to person, place, and time. He appears well-developed and well-nourished. He does not appear ill. No distress.   HENT:   Head: Normocephalic and atraumatic.   Nose: Nose normal.   Eyes: Right eye exhibits no discharge. Left eye exhibits no discharge. Right conjunctiva is not injected. Left conjunctiva is not injected. Right pupil is round. Left pupil is round. Pupils are equal.   Neck: Neck supple. No JVD present. Carotid bruit is not present. No thyromegaly present.   Cardiovascular: S1 normal and S2 normal. An irregularly irregular rhythm present.  No extrasystoles are present. Bradycardia present. PMI is not displaced.   Murmur heard.  High-pitched blowing holosystolic murmur is present with a grade of 2/6 at the apex.  Pulses:       Radial pulses are 2+ on the right side, and 2+ on the left side.        Femoral pulses are 2+ on the right side, and 2+ on the left side.       Dorsalis pedis pulses are 2+ on the right side, and 1+ on the left side.        Posterior tibial pulses are 2+ on the right side, and 0 on the left side.   Pulmonary/Chest: Effort normal and breath sounds normal.   Abdominal: Soft. Normal appearance. There is no hepatosplenomegaly. There is no tenderness.   Musculoskeletal:        Right ankle: He exhibits swelling. He exhibits no ecchymosis and no deformity.        Left ankle: He exhibits swelling. He exhibits no ecchymosis and no deformity.   Lymphadenopathy:        Head (right side): No submandibular adenopathy present.        Head (left side): No submandibular adenopathy present.     He has no " cervical adenopathy.   Neurological: He is alert and oriented to person, place, and time. He is not disoriented. No cranial nerve deficit or sensory deficit.   Skin: Skin is warm, dry and intact. No rash noted. He is not diaphoretic.   Psychiatric: He has a normal mood and affect. His speech is normal and behavior is normal. Judgment and thought content normal. Cognition and memory are normal.       Assessment:     1. Heart failure, systolic and diastolic, chronic    2. Permanent atrial fibrillation    3. Chronic anticoagulation    4. Bilateral carotid artery stenosis    5. History of cerebrovascular accident    6. Peripheral artery disease    7. Essential hypertension    8. Hypercholesterolemia    9. History of hemarthrosis        Plan:     1. Heart Failure, Systolic & Diastolic, Chronic              8/1/2016: Echo: Normal left ventricular size with mild systolic dysfunction. EF 45%. Markedly dilated LA. Moderate aortic valve sclerosis - 1.6 m/s.              1/8/2018: Normal left ventricular size with low normal systolic dysfunction. EF 50-55%. Anteroseptal wall mildly hypokinetic. Mild LVH. Markedly dilated LA. Moderate aortic valve sclerosis - 1.5 m/s.    Off ramipril 10 mg Q24 due to dry cough.   On metoprolol 50 mg Q24, losartan 100 mg Q24, furosemide 40 mg Q24 and KCl 10 mEq Q24.   May take extra furosemide 40 mg Q24 pm if needed for leg edema or fluid overload.   Previously seen edema of ankles that is well controlled with pressure stockings.              Appears reasonably well compensated.          2. Atrial Fibrillation              2010: Diagnosed with chronic atrial fibrillation.              [id number] ([id number])              On apixiban.              1/23/2018: Holter: Atrial fibrillation 81 () bpm. Briefly in 30s at night. Longest RR 2.1 sec.   On metoprolol 50 mg Q24 and digoxin 0.125 mg Q24.    12/12/2019: Rate appears on slow side at 54 bpm. Digoxin was discontinued.   He later resumed  digoxin.   2/13/2020: VRR 53 bpm. Stop digoxin.    Follow VRR.      3. Chronic Anticoagulation              2010: Diagnosed with chronic atrial fibrillation.              [id number]=0 ([id number]).              1/13/2018: Began apixiban.   On apixiban 5 mg Q12.              No aspirin or NSAID.   No bleeding.                 4. Carotid Artery Stenosis              2004: Right CEA.              8/26/2014: Carotid Duplex: DIANNA: Mild. LICA: Critical - 3.4 m/s.              9/11/2014: [facility] [place]: 4V: DIANNA: Mild. Left Vert: 90%. LICA: 95%.              9/2014: Left CEA.              12/1/2016: Carotid Duplex: Moderate plaquing.              12/13/2017: Carotid Duplex: Moderate plaquing.   12/10/2018: Carotid Duplex: Moderate plaquing.   12/4/2019: Carotid Duplex: Moderate plaquing.   No need for aspirin as anticoagulated with warfarin.              12/2020: Plan next Carotid Duplex. Then yearly.     5. History of Cerebrovascular Accident              5/2004: CVA. Left sided hemiplegia. Received tPA. Good recovery.              7/2004: Left temporal CVA. Weak.     6. Peripheral Artery Disease              10/25/2016: Arterial Duplex: Right: SFA: Distal 1.8 m/s. Left: No obstructive disease above knee.              Asymptomatic.     7. Hypertension              1990: Diagnosed.               On metoprolol 50 mg Q24, clonidine 0.1 mg Q8, nifedipine 60 mg Q12, losartan 100 mg Q24, furosemide 40 mg Q24 and KCl 10 mEq Q24.   Keeping log at home              Well controlled overall.     8. Hypercholesterolemia              2004: Began statin.              On rosuvastatin 40 mg Q24.              Tells me well controlled.     9. History of Knee Replacement   1/5/2018: Right knee pain became severe.   3/9/2018: Right knee replacement.              Dr. Nicholas Gonzalez.    10. Hyperuricemia   2008: Diagnosed.   On allopurinol 100 mg Q24    11. Primary Care   Dr. Dave Luna Jr..    F/u 2 months.    Zachary Pressley M.D.

## 2020-02-24 DIAGNOSIS — I48.21 PERMANENT ATRIAL FIBRILLATION: ICD-10-CM

## 2020-02-24 RX ORDER — METOPROLOL SUCCINATE 50 MG/1
50 TABLET, EXTENDED RELEASE ORAL DAILY
Qty: 90 TABLET | Refills: 3 | Status: SHIPPED | OUTPATIENT
Start: 2020-02-24 | End: 2020-11-26

## 2020-06-11 ENCOUNTER — OFFICE VISIT (OUTPATIENT)
Dept: CARDIOLOGY | Facility: CLINIC | Age: 80
End: 2020-06-11
Attending: INTERNAL MEDICINE
Payer: MEDICARE

## 2020-06-11 VITALS
SYSTOLIC BLOOD PRESSURE: 124 MMHG | HEART RATE: 80 BPM | WEIGHT: 169.75 LBS | BODY MASS INDEX: 25.73 KG/M2 | DIASTOLIC BLOOD PRESSURE: 84 MMHG | HEIGHT: 68 IN

## 2020-06-11 DIAGNOSIS — I50.42 HEART FAILURE, SYSTOLIC AND DIASTOLIC, CHRONIC: ICD-10-CM

## 2020-06-11 DIAGNOSIS — I65.23 BILATERAL CAROTID ARTERY STENOSIS: ICD-10-CM

## 2020-06-11 DIAGNOSIS — I10 ESSENTIAL HYPERTENSION: ICD-10-CM

## 2020-06-11 DIAGNOSIS — Z87.39: ICD-10-CM

## 2020-06-11 DIAGNOSIS — E78.00 HYPERCHOLESTEROLEMIA: ICD-10-CM

## 2020-06-11 DIAGNOSIS — Z79.01 CHRONIC ANTICOAGULATION: ICD-10-CM

## 2020-06-11 DIAGNOSIS — I73.9 PERIPHERAL ARTERY DISEASE: ICD-10-CM

## 2020-06-11 DIAGNOSIS — I48.21 PERMANENT ATRIAL FIBRILLATION: ICD-10-CM

## 2020-06-11 DIAGNOSIS — Z86.73 HISTORY OF CEREBROVASCULAR ACCIDENT: ICD-10-CM

## 2020-06-11 PROCEDURE — 3074F SYST BP LT 130 MM HG: CPT | Mod: CPTII,S$GLB,, | Performed by: INTERNAL MEDICINE

## 2020-06-11 PROCEDURE — 1159F PR MEDICATION LIST DOCUMENTED IN MEDICAL RECORD: ICD-10-PCS | Mod: S$GLB,,, | Performed by: INTERNAL MEDICINE

## 2020-06-11 PROCEDURE — 99214 PR OFFICE/OUTPT VISIT, EST, LEVL IV, 30-39 MIN: ICD-10-PCS | Mod: S$GLB,,, | Performed by: INTERNAL MEDICINE

## 2020-06-11 PROCEDURE — 1159F MED LIST DOCD IN RCRD: CPT | Mod: S$GLB,,, | Performed by: INTERNAL MEDICINE

## 2020-06-11 PROCEDURE — 99999 PR PBB SHADOW E&M-EST. PATIENT-LVL III: ICD-10-PCS | Mod: PBBFAC,,, | Performed by: INTERNAL MEDICINE

## 2020-06-11 PROCEDURE — 99214 OFFICE O/P EST MOD 30 MIN: CPT | Mod: S$GLB,,, | Performed by: INTERNAL MEDICINE

## 2020-06-11 PROCEDURE — 3079F PR MOST RECENT DIASTOLIC BLOOD PRESSURE 80-89 MM HG: ICD-10-PCS | Mod: CPTII,S$GLB,, | Performed by: INTERNAL MEDICINE

## 2020-06-11 PROCEDURE — 3074F PR MOST RECENT SYSTOLIC BLOOD PRESSURE < 130 MM HG: ICD-10-PCS | Mod: CPTII,S$GLB,, | Performed by: INTERNAL MEDICINE

## 2020-06-11 PROCEDURE — 99999 PR PBB SHADOW E&M-EST. PATIENT-LVL III: CPT | Mod: PBBFAC,,, | Performed by: INTERNAL MEDICINE

## 2020-06-11 PROCEDURE — 3079F DIAST BP 80-89 MM HG: CPT | Mod: CPTII,S$GLB,, | Performed by: INTERNAL MEDICINE

## 2020-06-11 NOTE — PROGRESS NOTES
Subjective:     Alan Gutiérrez is a 79 y.o. male with hypertension and hypercholesterolemia. He is overweight. He has a history of a cerebrovascular accident in 2004 and then had right carotid endarterectomy. He was diagnosed with atrial fibrillation in 2010. He has a [id number] score of 7 ([id number]) being on apixiban. He has well compensated systolic as well as diastolic heart failure. He underwent left CEA on 9/12/2014. He has received DRAKE injection which has helped his back pain. He has been bothered by swelling of especially the left ankle which has improved after he began wrapping the legs. He presented with severe pain in the right knee on 1/7/2018 and had hemarthrosis of the right knee in the setting of having had an elevated INR and a knee injection. He later had the warfarin changed to apixiban. He denies any chest pain or dyspnea. No palpitations or weak spells. No bleeding. Weak. Feeling fair overall.      Congestive Heart Failure   Presents for follow-up visit. Pertinent negatives include no abdominal pain, chest pain, chest pressure, claudication, edema, fatigue, muscle weakness, near-syncope, nocturia, orthopnea, palpitations, paroxysmal nocturnal dyspnea, shortness of breath or unexpected weight change. The symptoms have been stable.   Atrial Fibrillation   Presents for follow-up visit. Symptoms include hypertension. Symptoms are negative for bradycardia, chest pain, dizziness, hemodynamic instability, hypotension, palpitations, shortness of breath, syncope, tachycardia and weakness. The symptoms have been stable. Past medical history includes atrial fibrillation, CHF and hyperlipidemia.   Cerebrovascular Accident   The current episode started today. The problem has been unchanged. Pertinent negatives include no abdominal pain, anorexia, arthralgias, change in bowel habit, chest pain, chills, congestion, coughing, diaphoresis, fatigue, fever, headaches, joint swelling, myalgias, nausea, neck pain,  numbness, rash, sore throat, swollen glands, urinary symptoms, vertigo, visual change, vomiting or weakness.   Hypertension   This is a chronic problem. The current episode started more than 1 year ago. The problem is unchanged. The problem is controlled (usually 130-140/70-85 mmHg at home). Associated symptoms include malaise/fatigue. Pertinent negatives include no anxiety, blurred vision, chest pain, headaches, neck pain, orthopnea, palpitations, peripheral edema, PND, shortness of breath or sweats. There is no history of chronic renal disease.   Hyperlipidemia   This is a chronic problem. The current episode started more than 1 year ago. The problem is controlled. Recent lipid tests were reviewed and are normal. He has no history of chronic renal disease, diabetes, hypothyroidism, liver disease, obesity or nephrotic syndrome. Pertinent negatives include no chest pain, focal sensory loss, focal weakness, leg pain, myalgias or shortness of breath.       Review of Systems   Constitution: Positive for malaise/fatigue. Negative for chills, diaphoresis, fatigue, fever and unexpected weight change.   HENT: Negative for congestion and sore throat.    Eyes: Negative for blurred vision, vision loss in left eye and vision loss in right eye.   Cardiovascular: Negative for chest pain, claudication, dyspnea on exertion, irregular heartbeat, near-syncope, orthopnea, palpitations, paroxysmal nocturnal dyspnea and syncope.   Respiratory: Negative for cough, shortness of breath and wheezing.    Endocrine: Negative for cold intolerance.   Hematologic/Lymphatic: Negative for bleeding problem. Does not bruise/bleed easily.   Skin: Negative for rash.   Musculoskeletal: Positive for joint pain (right knee). Negative for arthralgias, falls, gout, joint swelling, muscle weakness, myalgias and neck pain.   Gastrointestinal: Negative for abdominal pain, anorexia, change in bowel habit, hematochezia, hemorrhoids, jaundice, melena, nausea  and vomiting.   Genitourinary: Negative for frequency, hematuria and nocturia.   Neurological: Negative for dizziness, focal weakness, headaches, light-headedness, loss of balance, numbness, tremors, vertigo and weakness.   Psychiatric/Behavioral: Negative for altered mental status, depression and memory loss. The patient is not nervous/anxious.    Allergic/Immunologic: Negative for hives and persistent infections.       Current Outpatient Medications on File Prior to Visit   Medication Sig Dispense Refill    allopurinol (ZYLOPRIM) 100 MG tablet Take 100 mg by mouth once daily.      apixaban (ELIQUIS) 5 mg Tab Take 1 tablet (5 mg total) by mouth 2 (two) times daily. 180 tablet 3    calcium citrate (CALCITRATE) 200 mg (950 mg) tablet Take 1 tablet by mouth once daily.      cloNIDine (CATAPRES) 0.1 MG tablet take one tablet by mouth every 8 hours at 7 am, 1 pm, and 9 pm  1    finasteride (PROSCAR) 5 mg tablet Take 5 mg by mouth once daily.      furosemide (LASIX) 40 MG tablet Take 1 tablet (40 mg total) by mouth once daily. 30 tablet 11    losartan (COZAAR) 100 MG tablet Take 1 tablet (100 mg total) by mouth once daily.      metoprolol succinate (TOPROL-XL) 50 MG 24 hr tablet TAKE 1 TABLET (50 MG TOTAL) BY MOUTH ONCE DAILY. 90 tablet 3    NIFEdipine (PROCARDIA-XL) 60 MG (OSM) 24 hr tablet Take 60 mg by mouth 2 (two) times a day.       potassium chloride (MICRO-K) 10 MEQ CpSR Take 10 mEq by mouth once daily.       rosuvastatin (CRESTOR) 40 MG Tab Take 40 mg by mouth once daily.      vitamin D 1000 units Tab Take 185 mg by mouth once daily.      albuterol (PROVENTIL/VENTOLIN HFA) 90 mcg/actuation inhaler Inhale 2 puffs into the lungs every 6 (six) hours as needed for Wheezing. Rescue (Patient not taking: Reported on 6/11/2020) 6.7 g 0    azithromycin (Z-SYDNEE) 250 MG tablet Take 2 tablets by mouth on day 1; Take 1 tablet by mouth on days 2-5 (Patient not taking: Reported on 6/11/2020) 6 tablet 0     "NIFEdipine (PROCARDIA-XL) 30 MG (OSM) 24 hr tablet Take 30 mg by mouth every morning.      oxyCODONE-acetaminophen (PERCOCET) 5-325 mg per tablet 1 tab every 4 hours PRN pain or 2 tablets every 6 hours PRN pain (Patient not taking: Reported on 6/11/2020) 90 tablet 0    [DISCONTINUED] olmesartan (BENICAR) 40 MG tablet Take 40 mg by mouth once daily.       No current facility-administered medications on file prior to visit.        /84 (BP Location: Right arm, Patient Position: Sitting)   Pulse 80   Ht 5' 8" (1.727 m)   Wt 77 kg (169 lb 12.1 oz)   BMI 25.81 kg/m²       Objective:     Physical Exam   Constitutional: He is oriented to person, place, and time. He appears well-developed and well-nourished. He does not appear ill. No distress.   HENT:   Head: Normocephalic and atraumatic.   Nose: Nose normal.   Eyes: Right eye exhibits no discharge. Left eye exhibits no discharge. Right conjunctiva is not injected. Left conjunctiva is not injected. Right pupil is round. Left pupil is round. Pupils are equal.   Neck: Neck supple. No JVD present. Carotid bruit is not present. No thyromegaly present.   Cardiovascular: S1 normal and S2 normal. An irregularly irregular rhythm present.  No extrasystoles are present. Bradycardia present. PMI is not displaced.   Murmur heard.  High-pitched blowing holosystolic murmur is present with a grade of 2/6 at the apex.  Pulses:       Radial pulses are 2+ on the right side, and 2+ on the left side.        Femoral pulses are 2+ on the right side, and 2+ on the left side.       Dorsalis pedis pulses are 2+ on the right side, and 1+ on the left side.        Posterior tibial pulses are 2+ on the right side, and 0 on the left side.   Pulmonary/Chest: Effort normal and breath sounds normal.   Abdominal: Soft. Normal appearance. There is no hepatosplenomegaly. There is no tenderness.   Musculoskeletal:        Right ankle: He exhibits no swelling, no ecchymosis and no deformity.        " Left ankle: He exhibits no swelling, no ecchymosis and no deformity.   Lymphadenopathy:        Head (right side): No submandibular adenopathy present.        Head (left side): No submandibular adenopathy present.     He has no cervical adenopathy.   Neurological: He is alert and oriented to person, place, and time. He is not disoriented. No cranial nerve deficit or sensory deficit.   Skin: Skin is warm, dry and intact. He is not diaphoretic.   Psychiatric: He has a normal mood and affect. His speech is normal and behavior is normal. Judgment and thought content normal. Cognition and memory are normal.       Assessment:     1. Heart failure, systolic and diastolic, chronic    2. Permanent atrial fibrillation    3. Chronic anticoagulation    4. Bilateral carotid artery stenosis    5. History of cerebrovascular accident    6. Peripheral artery disease    7. Essential hypertension    8. Hypercholesterolemia    9. History of hemarthrosis        Plan:     1. Heart Failure, Systolic & Diastolic, Chronic              8/1/2016: Echo: Normal left ventricular size with mild systolic dysfunction. EF 45%. Markedly dilated LA. Moderate aortic valve sclerosis - 1.6 m/s.              1/8/2018: Normal left ventricular size with low normal systolic dysfunction. EF 50-55%. Anteroseptal wall mildly hypokinetic. Mild LVH. Markedly dilated LA. Moderate aortic valve sclerosis - 1.5 m/s.    Off ramipril 10 mg Q24 due to dry cough.   On metoprolol 50 mg Q24, losartan 100 mg Q24, furosemide 40 mg Q24 and KCl 10 mEq Q24.   May take extra furosemide 40 mg Q24 pm if needed for leg edema or fluid overload.   Previously seen edema of ankles that is well controlled with pressure stockings.              Appears reasonably well compensated.          2. Atrial Fibrillation              2010: Diagnosed with chronic atrial fibrillation.              [id number] ([id number])              On apixiban.              1/23/2018: Holter: Atrial fibrillation 81  () bpm. Briefly in 30s at night. Longest RR 2.1 sec.   On metoprolol 50 mg Q24 and digoxin 0.125 mg Q24.    12/12/2019: Rate appears on slow side at 54 bpm. Digoxin was discontinued.   He later resumed digoxin.   2/13/2020: VRR 53 bpm. Digoxin was discontinued.    On metoprolol 50 mg Q24.   6/11/2020: VRR 80.   6/11/2020: Do Holter     3. Chronic Anticoagulation              2010: Diagnosed with chronic atrial fibrillation.              [id number] ([id number]).              1/13/2018: Began apixiban.   On apixiban 5 mg Q12.              No aspirin or NSAID.   No bleeding.                 4. Carotid Artery Stenosis              2004: Right CEA.              8/26/2014: Carotid Duplex: DIANNA: Mild. LICA: Critical - 3.4 m/s.              9/11/2014: [facility] [place]: DIANNA: Mild. Left Vert: 90%. LICA: 95%.              9/2014: Left CEA.              12/1/2016: Carotid Duplex: Moderate plaquing.              12/13/2017: Carotid Duplex: Moderate plaquing.   12/10/2018: Carotid Duplex: Moderate plaquing.   12/4/2019: Carotid Duplex: Moderate plaquing.   No need for aspirin as anticoagulated with warfarin.              12/2020: Plan next Carotid Duplex. Then yearly.     5. History of Cerebrovascular Accident              5/2004: CVA. Left sided hemiplegia. Received tPA. Good recovery.              7/2004: Left temporal CVA. Weak.     6. Peripheral Artery Disease              10/25/2016: Arterial Duplex: Right: SFA: Distal 1.8 m/s. Left: No obstructive disease above knee.              Asymptomatic.     7. Hypertension              1990: Diagnosed.               On metoprolol 50 mg Q24, clonidine 0.1 mg Q8, nifedipine 60 mg Q12, losartan 100 mg Q24, furosemide 40 mg Q24 and KCl 10 mEq Q24.   Keeping log at home              Well controlled overall.     8. Hypercholesterolemia              2004: Began statin.              On rosuvastatin 40 mg Q24.              Tells me well controlled.     9. History of Knee  Replacement   1/5/2018: Right knee pain became severe.   3/9/2018: Right knee replacement.              Dr. Nicholas Gonzalez.    10. Hyperuricemia   2008: Diagnosed.   On allopurinol 100 mg Q24    11. Primary Care   Dr. Gumaro Singh.    F/u 3 months.    Zachary Pressley M.D.      6/23/2020 8:05 PM, Addendum:    6/22/2020: Holter: Atrial fibrillation 99 () bpm. Frequent VPC's - 16/hr. 10 V couplets. 4 triplets. No symptoms.    Stay on metoprolol 50 mg Q24.    I discussed the above test result and the implications of the findings over the phone.    Zachary Pressley M.D.

## 2020-06-18 ENCOUNTER — HOSPITAL ENCOUNTER (OUTPATIENT)
Dept: CARDIOLOGY | Facility: OTHER | Age: 80
Discharge: HOME | End: 2020-06-18
Attending: INTERNAL MEDICINE
Payer: MEDICARE

## 2020-06-18 DIAGNOSIS — I48.21 PERMANENT ATRIAL FIBRILLATION: ICD-10-CM

## 2020-06-18 PROCEDURE — 93227 HOLTER MONITOR - 24 HOUR (CUPID ONLY): ICD-10-PCS | Mod: ,,, | Performed by: INTERNAL MEDICINE

## 2020-06-18 PROCEDURE — 93227 XTRNL ECG REC<48 HR R&I: CPT | Mod: ,,, | Performed by: INTERNAL MEDICINE

## 2020-06-18 PROCEDURE — 93225 XTRNL ECG REC<48 HRS REC: CPT

## 2020-06-22 LAB
OHS CV EVENT MONITOR DAY: 0
OHS CV HOLTER LENGTH DECIMAL HOURS: 23
OHS CV HOLTER LENGTH HOURS: 23
OHS CV HOLTER LENGTH MINUTES: 0

## 2020-10-15 ENCOUNTER — OFFICE VISIT (OUTPATIENT)
Dept: CARDIOLOGY | Facility: CLINIC | Age: 80
End: 2020-10-15
Attending: INTERNAL MEDICINE
Payer: MEDICARE

## 2020-10-15 VITALS
HEART RATE: 102 BPM | HEIGHT: 68 IN | SYSTOLIC BLOOD PRESSURE: 113 MMHG | BODY MASS INDEX: 23.49 KG/M2 | DIASTOLIC BLOOD PRESSURE: 74 MMHG | WEIGHT: 155 LBS

## 2020-10-15 DIAGNOSIS — I50.42 HEART FAILURE, SYSTOLIC AND DIASTOLIC, CHRONIC: ICD-10-CM

## 2020-10-15 DIAGNOSIS — Z86.73 HISTORY OF CEREBROVASCULAR ACCIDENT: ICD-10-CM

## 2020-10-15 DIAGNOSIS — I10 ESSENTIAL HYPERTENSION: ICD-10-CM

## 2020-10-15 DIAGNOSIS — Z79.01 CHRONIC ANTICOAGULATION: ICD-10-CM

## 2020-10-15 DIAGNOSIS — E78.00 HYPERCHOLESTEROLEMIA: ICD-10-CM

## 2020-10-15 DIAGNOSIS — I73.9 PERIPHERAL ARTERY DISEASE: ICD-10-CM

## 2020-10-15 DIAGNOSIS — Z87.39: ICD-10-CM

## 2020-10-15 DIAGNOSIS — I48.21 PERMANENT ATRIAL FIBRILLATION: ICD-10-CM

## 2020-10-15 DIAGNOSIS — I65.23 BILATERAL CAROTID ARTERY STENOSIS: ICD-10-CM

## 2020-10-15 PROCEDURE — 1159F PR MEDICATION LIST DOCUMENTED IN MEDICAL RECORD: ICD-10-PCS | Mod: S$GLB,,, | Performed by: INTERNAL MEDICINE

## 2020-10-15 PROCEDURE — 1159F MED LIST DOCD IN RCRD: CPT | Mod: S$GLB,,, | Performed by: INTERNAL MEDICINE

## 2020-10-15 PROCEDURE — 3074F PR MOST RECENT SYSTOLIC BLOOD PRESSURE < 130 MM HG: ICD-10-PCS | Mod: CPTII,S$GLB,, | Performed by: INTERNAL MEDICINE

## 2020-10-15 PROCEDURE — 3074F SYST BP LT 130 MM HG: CPT | Mod: CPTII,S$GLB,, | Performed by: INTERNAL MEDICINE

## 2020-10-15 PROCEDURE — 99214 OFFICE O/P EST MOD 30 MIN: CPT | Mod: 25,S$GLB,, | Performed by: INTERNAL MEDICINE

## 2020-10-15 PROCEDURE — 3078F PR MOST RECENT DIASTOLIC BLOOD PRESSURE < 80 MM HG: ICD-10-PCS | Mod: CPTII,S$GLB,, | Performed by: INTERNAL MEDICINE

## 2020-10-15 PROCEDURE — 93005 ELECTROCARDIOGRAM TRACING: CPT

## 2020-10-15 PROCEDURE — 99999 PR PBB SHADOW E&M-EST. PATIENT-LVL III: CPT | Mod: PBBFAC,,, | Performed by: INTERNAL MEDICINE

## 2020-10-15 PROCEDURE — 93000 PR ELECTROCARDIOGRAM, COMPLETE: ICD-10-PCS | Mod: S$GLB,,, | Performed by: INTERNAL MEDICINE

## 2020-10-15 PROCEDURE — 99214 PR OFFICE/OUTPT VISIT, EST, LEVL IV, 30-39 MIN: ICD-10-PCS | Mod: 25,S$GLB,, | Performed by: INTERNAL MEDICINE

## 2020-10-15 PROCEDURE — 99999 PR PBB SHADOW E&M-EST. PATIENT-LVL III: ICD-10-PCS | Mod: PBBFAC,,, | Performed by: INTERNAL MEDICINE

## 2020-10-15 PROCEDURE — 3078F DIAST BP <80 MM HG: CPT | Mod: CPTII,S$GLB,, | Performed by: INTERNAL MEDICINE

## 2020-10-15 PROCEDURE — 93000 ELECTROCARDIOGRAM COMPLETE: CPT | Mod: S$GLB,,, | Performed by: INTERNAL MEDICINE

## 2020-10-15 NOTE — PROGRESS NOTES
Subjective:     Alan Gutiérrez is a 79 y.o. male with hypertension and hypercholesterolemia. He has a healthy weight but used to be overweight. He has a history of a cerebrovascular accident in 2004 and then had right carotid endarterectomy. He was diagnosed with atrial fibrillation in 2010. He has a [id number] score of 7 ([id number]) being on apixiban. He has well compensated systolic as well as diastolic heart failure. He underwent left CEA on 9/12/2014. He has received DRAKE injection which has helped his back pain. He has been bothered by swelling of especially the left ankle which has improved after he began wrapping the legs. He presented with severe pain in the right knee on 1/7/2018 and had hemarthrosis of the right knee in the setting of having had an elevated INR and a knee injection. He later had the warfarin changed to apixiban. He denies any chest pain or dyspnea. No palpitations or weak spells. No bleeding. Weak. Feeling fair overall.      Congestive Heart Failure  Presents for follow-up visit. Pertinent negatives include no abdominal pain, chest pain, chest pressure, claudication, edema, fatigue, muscle weakness, near-syncope, nocturia, orthopnea, palpitations, paroxysmal nocturnal dyspnea, shortness of breath or unexpected weight change. The symptoms have been stable.   Atrial Fibrillation  Presents for follow-up visit. Symptoms include hypertension. Symptoms are negative for bradycardia, chest pain, dizziness, hemodynamic instability, hypotension, palpitations, shortness of breath, syncope, tachycardia and weakness. The symptoms have been stable. Past medical history includes atrial fibrillation, CHF and hyperlipidemia.   Cerebrovascular Accident  This is a chronic problem. The problem has been unchanged. Pertinent negatives include no abdominal pain, anorexia, arthralgias, change in bowel habit, chest pain, chills, congestion, coughing, diaphoresis, fatigue, fever, headaches, joint swelling,  myalgias, nausea, neck pain, numbness, rash, sore throat, swollen glands, urinary symptoms, vertigo, visual change, vomiting or weakness.   Hypertension  This is a chronic problem. The current episode started more than 1 year ago. The problem is unchanged. The problem is controlled (usually 130-140/70-85 mmHg at home). Associated symptoms include malaise/fatigue. Pertinent negatives include no anxiety, blurred vision, chest pain, headaches, neck pain, orthopnea, palpitations, peripheral edema, PND, shortness of breath or sweats. There is no history of chronic renal disease.   Hyperlipidemia  This is a chronic problem. The current episode started more than 1 year ago. The problem is controlled. Recent lipid tests were reviewed and are normal. He has no history of chronic renal disease, diabetes, hypothyroidism, liver disease, obesity or nephrotic syndrome. Pertinent negatives include no chest pain, focal sensory loss, focal weakness, leg pain, myalgias or shortness of breath.       Review of Systems   Constitution: Positive for malaise/fatigue. Negative for chills, diaphoresis, fatigue, fever and unexpected weight change.   HENT: Negative for congestion and sore throat.    Eyes: Negative for blurred vision, vision loss in left eye and vision loss in right eye.   Cardiovascular: Positive for leg swelling. Negative for chest pain, claudication, dyspnea on exertion, irregular heartbeat (mild left ankle), near-syncope, orthopnea, palpitations, paroxysmal nocturnal dyspnea and syncope.   Respiratory: Negative for cough, shortness of breath and wheezing.    Endocrine: Negative for cold intolerance.   Hematologic/Lymphatic: Negative for bleeding problem. Does not bruise/bleed easily.   Skin: Negative for rash.   Musculoskeletal: Positive for joint pain (right knee). Negative for arthralgias, falls, gout, joint swelling, muscle weakness, myalgias and neck pain.   Gastrointestinal: Negative for abdominal pain, anorexia,  change in bowel habit, hematochezia, hemorrhoids, jaundice, melena, nausea and vomiting.   Genitourinary: Negative for frequency, hematuria and nocturia.   Neurological: Negative for dizziness, focal weakness, headaches, light-headedness, loss of balance, numbness, tremors, vertigo and weakness.   Psychiatric/Behavioral: Negative for altered mental status, depression and memory loss. The patient is not nervous/anxious.    Allergic/Immunologic: Negative for hives and persistent infections.       Current Outpatient Medications on File Prior to Visit   Medication Sig Dispense Refill    allopurinol (ZYLOPRIM) 100 MG tablet Take 100 mg by mouth once daily.      apixaban (ELIQUIS) 5 mg Tab Take 1 tablet (5 mg total) by mouth 2 (two) times daily. 180 tablet 3    calcium citrate (CALCITRATE) 200 mg (950 mg) tablet Take 1 tablet by mouth once daily.      cloNIDine (CATAPRES) 0.1 MG tablet take one tablet by mouth every 8 hours at 7 am, 1 pm, and 9 pm  1    finasteride (PROSCAR) 5 mg tablet Take 5 mg by mouth once daily.      furosemide (LASIX) 40 MG tablet Take 1 tablet (40 mg total) by mouth once daily. 30 tablet 11    losartan (COZAAR) 100 MG tablet Take 1 tablet (100 mg total) by mouth once daily.      metoprolol succinate (TOPROL-XL) 50 MG 24 hr tablet TAKE 1 TABLET (50 MG TOTAL) BY MOUTH ONCE DAILY. 90 tablet 3    potassium chloride (MICRO-K) 10 MEQ CpSR Take 10 mEq by mouth once daily.       rosuvastatin (CRESTOR) 40 MG Tab Take 40 mg by mouth once daily.      vitamin D 1000 units Tab Take 185 mg by mouth once daily.      albuterol (PROVENTIL/VENTOLIN HFA) 90 mcg/actuation inhaler Inhale 2 puffs into the lungs every 6 (six) hours as needed for Wheezing. Rescue (Patient not taking: Reported on 6/11/2020) 6.7 g 0    azithromycin (Z-SYDNEE) 250 MG tablet Take 2 tablets by mouth on day 1; Take 1 tablet by mouth on days 2-5 (Patient not taking: Reported on 6/11/2020) 6 tablet 0    NIFEdipine (PROCARDIA-XL) 30 MG  "(OSM) 24 hr tablet Take 30 mg by mouth every morning.      NIFEdipine (PROCARDIA-XL) 60 MG (OSM) 24 hr tablet Take 60 mg by mouth 2 (two) times a day.       oxyCODONE-acetaminophen (PERCOCET) 5-325 mg per tablet 1 tab every 4 hours PRN pain or 2 tablets every 6 hours PRN pain (Patient not taking: Reported on 6/11/2020) 90 tablet 0    [DISCONTINUED] olmesartan (BENICAR) 40 MG tablet Take 40 mg by mouth once daily.       No current facility-administered medications on file prior to visit.        /74 (BP Location: Left arm, Patient Position: Sitting, BP Method: Large (Automatic))   Pulse 102   Ht 5' 8" (1.727 m)   Wt 70.3 kg (154 lb 15.7 oz)   BMI 23.57 kg/m²       Objective:     Physical Exam   Constitutional: He is oriented to person, place, and time. He appears well-developed and well-nourished. He does not appear ill. No distress.   HENT:   Head: Normocephalic and atraumatic.   Nose: Nose normal.   Eyes: Right eye exhibits no discharge. Left eye exhibits no discharge. Right conjunctiva is not injected. Left conjunctiva is not injected. Right pupil is round. Left pupil is round. Pupils are equal.   Neck: Neck supple. No JVD present. Carotid bruit is not present. No thyromegaly present.   Cardiovascular: Normal rate, S1 normal and S2 normal. An irregularly irregular rhythm present.  No extrasystoles are present. PMI is not displaced.   Murmur heard.  High-pitched blowing holosystolic murmur is present with a grade of 2/6 at the apex.  Pulses:       Radial pulses are 2+ on the right side and 2+ on the left side.        Femoral pulses are 2+ on the right side and 2+ on the left side.       Dorsalis pedis pulses are 2+ on the right side and 1+ on the left side.        Posterior tibial pulses are 2+ on the right side and 0 on the left side.   Pulmonary/Chest: Effort normal and breath sounds normal.   Abdominal: Soft. Normal appearance. There is no hepatosplenomegaly. There is no abdominal tenderness. "   Musculoskeletal:      Right ankle: He exhibits no swelling, no ecchymosis and no deformity.      Left ankle: He exhibits swelling. He exhibits no ecchymosis and no deformity.   Lymphadenopathy:        Head (right side): No submandibular adenopathy present.        Head (left side): No submandibular adenopathy present.     He has no cervical adenopathy.   Neurological: He is alert and oriented to person, place, and time. He is not disoriented. No cranial nerve deficit or sensory deficit.   Skin: Skin is warm, dry and intact. He is not diaphoretic.   Psychiatric: He has a normal mood and affect. His speech is normal and behavior is normal. Judgment and thought content normal. Cognition and memory are normal.       Assessment:     1. Heart failure, systolic and diastolic, chronic    2. Permanent atrial fibrillation    3. Chronic anticoagulation    4. Bilateral carotid artery stenosis    5. History of cerebrovascular accident    6. Peripheral artery disease    7. Essential hypertension    8. Hypercholesterolemia    9. History of hemarthrosis        Plan:     1. Heart Failure, Systolic & Diastolic, Chronic              8/1/2016: Echo: Normal left ventricular size with mild systolic dysfunction. EF 45%. Markedly dilated LA. Moderate aortic valve sclerosis - 1.6 m/s.              1/8/2018: Normal left ventricular size with low normal systolic dysfunction. EF 50-55%. Anteroseptal wall mildly hypokinetic. Mild LVH. Markedly dilated LA. Moderate aortic valve sclerosis - 1.5 m/s.    Off ramipril 10 mg Q24 due to dry cough.   On metoprolol 50 mg Q24, losartan 100 mg Q24, furosemide 40 mg Q24 and KCl 10 mEq Q24.   May take extra furosemide 40 mg Q24 pm if needed for leg edema or fluid overload.   Previously seen edema of ankles that is well controlled with pressure stockings.              Appears reasonably well compensated.          2. Atrial Fibrillation              2010: Diagnosed with chronic atrial fibrillation.               [id number] (COH6V2P)              On apixiban.              1/23/2018: Holter: Atrial fibrillation 81 () bpm. Briefly in 30s at night. Longest RR 2.1 sec.   On metoprolol 50 mg Q24 and digoxin 0.125 mg Q24.    12/12/2019: Rate appears on slow side at 54 bpm. Digoxin was discontinued.   He later resumed digoxin.   2/13/2020: VRR 53 bpm. Digoxin 0.125 mg Q24 was discontinued due to slow VRR.    6/22/2020: Holter: Atrial fibrillation 99 () bpm. Frequent VPC's - 16/hr. 10 V couplets. 4 triplets. No symptoms.   On metoprolol 50 mg Q24.   VRR well controlled.     3. Chronic Anticoagulation              2010: Diagnosed with chronic atrial fibrillation.              [id number] ([id number]).              1/13/2018: Began apixiban.   On apixiban 5 mg Q12.              No aspirin or NSAID.   No bleeding.                 4. Carotid Artery Stenosis              2004: Right CEA.              8/26/2014: Carotid Duplex: DIANNA: Mild. LICA: Critical - 3.4 m/s.              9/11/2014: [facility] [place]: 4V: DIANNA: Mild. Left Vert: 90%. LICA: 95%.              9/2014: Left CEA.              12/1/2016: Carotid Duplex: Moderate plaquing.              12/13/2017: Carotid Duplex: Moderate plaquing.   12/10/2018: Carotid Duplex: Moderate plaquing.   12/4/2019: Carotid Duplex: Moderate plaquing.   No need for aspirin as anticoagulated with warfarin.              12/2020: Plan next Carotid Duplex. Then yearly.     5. History of Cerebrovascular Accident              5/2004: CVA. Left sided hemiplegia. Received tPA. Good recovery.              7/2004: Left temporal CVA. Weak.     6. Peripheral Artery Disease              10/25/2016: Arterial Duplex: Right: SFA: Distal 1.8 m/s. Left: No obstructive disease above knee.              Asymptomatic.     7. Hypertension              1990: Diagnosed.               On metoprolol 50 mg Q24, clonidine 0.1 mg Q8, nifedipine 60 mg Q12, losartan 100 mg Q24, furosemide 40 mg Q24 and KCl 10 mEq  Q24.   Keeping log at home              Well controlled overall.     8. Hypercholesterolemia              2004: Began statin.              On rosuvastatin 40 mg Q24.   8/1/2019: Chol 161. HDL 57. LDL 86. TG 86.   On rosuvastatin 40 mg Q24.              Favorable lipid panel.     9. History of Knee Replacement   1/5/2018: Right knee pain became severe.   3/9/2018: Right knee replacement.              Dr. Nicholas Gonzalez.    10. Hyperuricemia   2008: Diagnosed.   On allopurinol 100 mg Q24    11. Primary Care   Dr. Gumaro Singh.    F/u 4 months.    Zachary Pressley M.D.

## 2021-02-25 ENCOUNTER — OFFICE VISIT (OUTPATIENT)
Dept: CARDIOLOGY | Facility: CLINIC | Age: 81
End: 2021-02-25
Attending: INTERNAL MEDICINE
Payer: MEDICARE

## 2021-02-25 VITALS
DIASTOLIC BLOOD PRESSURE: 68 MMHG | SYSTOLIC BLOOD PRESSURE: 105 MMHG | HEART RATE: 72 BPM | BODY MASS INDEX: 25.79 KG/M2 | HEIGHT: 68 IN | WEIGHT: 170.19 LBS

## 2021-02-25 DIAGNOSIS — I73.9 PERIPHERAL ARTERY DISEASE: ICD-10-CM

## 2021-02-25 DIAGNOSIS — Z86.73 HISTORY OF CEREBROVASCULAR ACCIDENT: ICD-10-CM

## 2021-02-25 DIAGNOSIS — I50.42 HEART FAILURE, SYSTOLIC AND DIASTOLIC, CHRONIC: ICD-10-CM

## 2021-02-25 DIAGNOSIS — Z87.39: ICD-10-CM

## 2021-02-25 DIAGNOSIS — I10 ESSENTIAL HYPERTENSION: ICD-10-CM

## 2021-02-25 DIAGNOSIS — E78.00 HYPERCHOLESTEROLEMIA: ICD-10-CM

## 2021-02-25 DIAGNOSIS — I48.21 PERMANENT ATRIAL FIBRILLATION: ICD-10-CM

## 2021-02-25 DIAGNOSIS — I65.23 BILATERAL CAROTID ARTERY STENOSIS: ICD-10-CM

## 2021-02-25 DIAGNOSIS — Z79.01 CHRONIC ANTICOAGULATION: ICD-10-CM

## 2021-02-25 PROCEDURE — 99999 PR PBB SHADOW E&M-EST. PATIENT-LVL III: CPT | Mod: PBBFAC,,, | Performed by: INTERNAL MEDICINE

## 2021-02-25 PROCEDURE — 99214 PR OFFICE/OUTPT VISIT, EST, LEVL IV, 30-39 MIN: ICD-10-PCS | Mod: S$GLB,,, | Performed by: INTERNAL MEDICINE

## 2021-02-25 PROCEDURE — 3078F DIAST BP <80 MM HG: CPT | Mod: CPTII,S$GLB,, | Performed by: INTERNAL MEDICINE

## 2021-02-25 PROCEDURE — 99999 PR PBB SHADOW E&M-EST. PATIENT-LVL III: ICD-10-PCS | Mod: PBBFAC,,, | Performed by: INTERNAL MEDICINE

## 2021-02-25 PROCEDURE — 3074F SYST BP LT 130 MM HG: CPT | Mod: CPTII,S$GLB,, | Performed by: INTERNAL MEDICINE

## 2021-02-25 PROCEDURE — 1159F MED LIST DOCD IN RCRD: CPT | Mod: S$GLB,,, | Performed by: INTERNAL MEDICINE

## 2021-02-25 PROCEDURE — 3078F PR MOST RECENT DIASTOLIC BLOOD PRESSURE < 80 MM HG: ICD-10-PCS | Mod: CPTII,S$GLB,, | Performed by: INTERNAL MEDICINE

## 2021-02-25 PROCEDURE — 1159F PR MEDICATION LIST DOCUMENTED IN MEDICAL RECORD: ICD-10-PCS | Mod: S$GLB,,, | Performed by: INTERNAL MEDICINE

## 2021-02-25 PROCEDURE — 3074F PR MOST RECENT SYSTOLIC BLOOD PRESSURE < 130 MM HG: ICD-10-PCS | Mod: CPTII,S$GLB,, | Performed by: INTERNAL MEDICINE

## 2021-02-25 PROCEDURE — 99214 OFFICE O/P EST MOD 30 MIN: CPT | Mod: S$GLB,,, | Performed by: INTERNAL MEDICINE

## 2021-03-02 ENCOUNTER — HOSPITAL ENCOUNTER (OUTPATIENT)
Dept: CARDIOLOGY | Facility: OTHER | Age: 81
Discharge: HOME | End: 2021-03-02
Attending: INTERNAL MEDICINE
Payer: MEDICARE

## 2021-03-02 DIAGNOSIS — I65.23 BILATERAL CAROTID ARTERY STENOSIS: ICD-10-CM

## 2021-03-02 LAB
LEFT ARM DIASTOLIC BLOOD PRESSURE: 67 MMHG
LEFT ARM SYSTOLIC BLOOD PRESSURE: 109 MMHG
LEFT CBA DIAS: 10 CM/S
LEFT CBA SYS: 64 CM/S
LEFT CCA DIST DIAS: 8 CM/S
LEFT CCA DIST SYS: 66 CM/S
LEFT CCA MID DIAS: 9 CM/S
LEFT CCA MID SYS: 68 CM/S
LEFT CCA PROX DIAS: 14 CM/S
LEFT CCA PROX SYS: 106 CM/S
LEFT ECA DIAS: 7 CM/S
LEFT ECA SYS: 106 CM/S
LEFT ICA DIST DIAS: 13 CM/S
LEFT ICA DIST SYS: 51 CM/S
LEFT ICA MID DIAS: 17 CM/S
LEFT ICA MID SYS: 65 CM/S
LEFT ICA PROX DIAS: 16 CM/S
LEFT ICA PROX SYS: 52 CM/S
LEFT VERTEBRAL DIAS: 18 CM/S
LEFT VERTEBRAL SYS: 46 CM/S
OHS CV CAROTID RIGHT ICA EDV HIGHEST: 20
OHS CV CAROTID ULTRASOUND LEFT ICA/CCA RATIO: 0.98
OHS CV CAROTID ULTRASOUND RIGHT ICA/CCA RATIO: 1.26
OHS CV PV CAROTID LEFT HIGHEST CCA: 106
OHS CV PV CAROTID LEFT HIGHEST ICA: 65
OHS CV PV CAROTID RIGHT HIGHEST CCA: 64
OHS CV PV CAROTID RIGHT HIGHEST ICA: 72
OHS CV US CAROTID LEFT HIGHEST EDV: 17
RIGHT ARM DIASTOLIC BLOOD PRESSURE: 67 MMHG
RIGHT ARM SYSTOLIC BLOOD PRESSURE: 104 MMHG
RIGHT CBA DIAS: 10 CM/S
RIGHT CBA SYS: 60 CM/S
RIGHT CCA DIST DIAS: 9 CM/S
RIGHT CCA DIST SYS: 57 CM/S
RIGHT CCA MID DIAS: 11 CM/S
RIGHT CCA MID SYS: 57 CM/S
RIGHT CCA PROX DIAS: 10 CM/S
RIGHT CCA PROX SYS: 64 CM/S
RIGHT ECA DIAS: 9 CM/S
RIGHT ECA SYS: 81 CM/S
RIGHT ICA DIST DIAS: 20 CM/S
RIGHT ICA DIST SYS: 72 CM/S
RIGHT ICA MID DIAS: 12 CM/S
RIGHT ICA MID SYS: 60 CM/S
RIGHT ICA PROX DIAS: 17 CM/S
RIGHT ICA PROX SYS: 64 CM/S
RIGHT VERTEBRAL DIAS: 10 CM/S
RIGHT VERTEBRAL SYS: 42 CM/S

## 2021-03-02 PROCEDURE — 93880 CV US DOPPLER CAROTID (CUPID ONLY): ICD-10-PCS | Mod: 26,,, | Performed by: INTERNAL MEDICINE

## 2021-03-02 PROCEDURE — 93880 EXTRACRANIAL BILAT STUDY: CPT

## 2021-03-02 PROCEDURE — 93880 EXTRACRANIAL BILAT STUDY: CPT | Mod: 26,,, | Performed by: INTERNAL MEDICINE

## 2021-04-16 ENCOUNTER — PATIENT MESSAGE (OUTPATIENT)
Dept: RESEARCH | Facility: HOSPITAL | Age: 81
End: 2021-04-16

## 2021-04-23 ENCOUNTER — OUTSIDE PLACE OF SERVICE (OUTPATIENT)
Dept: CARDIOLOGY | Facility: CLINIC | Age: 81
End: 2021-04-23
Payer: MEDICARE

## 2021-04-23 PROCEDURE — 99222 1ST HOSP IP/OBS MODERATE 55: CPT | Mod: ,,, | Performed by: INTERNAL MEDICINE

## 2021-04-23 PROCEDURE — 99222 PR INITIAL HOSPITAL CARE,LEVL II: ICD-10-PCS | Mod: ,,, | Performed by: INTERNAL MEDICINE

## 2021-04-24 ENCOUNTER — OUTSIDE PLACE OF SERVICE (OUTPATIENT)
Dept: CARDIOLOGY | Facility: CLINIC | Age: 81
End: 2021-04-24
Payer: MEDICARE

## 2021-04-24 PROCEDURE — 99232 PR SUBSEQUENT HOSPITAL CARE,LEVL II: ICD-10-PCS | Mod: ,,, | Performed by: INTERNAL MEDICINE

## 2021-04-24 PROCEDURE — 99232 SBSQ HOSP IP/OBS MODERATE 35: CPT | Mod: ,,, | Performed by: INTERNAL MEDICINE

## 2021-04-25 ENCOUNTER — OUTSIDE PLACE OF SERVICE (OUTPATIENT)
Dept: CARDIOLOGY | Facility: CLINIC | Age: 81
End: 2021-04-25
Payer: MEDICARE

## 2021-04-25 PROCEDURE — 99231 SBSQ HOSP IP/OBS SF/LOW 25: CPT | Mod: ,,, | Performed by: INTERNAL MEDICINE

## 2021-04-25 PROCEDURE — 99231 PR SUBSEQUENT HOSPITAL CARE,LEVL I: ICD-10-PCS | Mod: ,,, | Performed by: INTERNAL MEDICINE

## 2021-04-26 ENCOUNTER — OUTSIDE PLACE OF SERVICE (OUTPATIENT)
Dept: CARDIOLOGY | Facility: CLINIC | Age: 81
End: 2021-04-26
Payer: MEDICARE

## 2021-04-26 PROCEDURE — 99232 SBSQ HOSP IP/OBS MODERATE 35: CPT | Mod: ,,, | Performed by: INTERNAL MEDICINE

## 2021-04-26 PROCEDURE — 99232 PR SUBSEQUENT HOSPITAL CARE,LEVL II: ICD-10-PCS | Mod: ,,, | Performed by: INTERNAL MEDICINE

## 2021-06-24 ENCOUNTER — OFFICE VISIT (OUTPATIENT)
Dept: CARDIOLOGY | Facility: CLINIC | Age: 81
End: 2021-06-24
Attending: INTERNAL MEDICINE
Payer: MEDICARE

## 2021-06-24 VITALS
SYSTOLIC BLOOD PRESSURE: 110 MMHG | OXYGEN SATURATION: 97 % | WEIGHT: 172.38 LBS | BODY MASS INDEX: 26.21 KG/M2 | HEART RATE: 63 BPM | DIASTOLIC BLOOD PRESSURE: 78 MMHG

## 2021-06-24 DIAGNOSIS — Z87.39: ICD-10-CM

## 2021-06-24 DIAGNOSIS — I48.21 PERMANENT ATRIAL FIBRILLATION: ICD-10-CM

## 2021-06-24 DIAGNOSIS — I50.42 HEART FAILURE, SYSTOLIC AND DIASTOLIC, CHRONIC: ICD-10-CM

## 2021-06-24 DIAGNOSIS — I10 ESSENTIAL HYPERTENSION: ICD-10-CM

## 2021-06-24 DIAGNOSIS — E78.00 HYPERCHOLESTEROLEMIA: ICD-10-CM

## 2021-06-24 DIAGNOSIS — I65.23 BILATERAL CAROTID ARTERY STENOSIS: ICD-10-CM

## 2021-06-24 DIAGNOSIS — Z79.01 CHRONIC ANTICOAGULATION: ICD-10-CM

## 2021-06-24 DIAGNOSIS — Z86.73 HISTORY OF CEREBROVASCULAR ACCIDENT: ICD-10-CM

## 2021-06-24 PROCEDURE — 1101F PT FALLS ASSESS-DOCD LE1/YR: CPT | Mod: CPTII,S$GLB,, | Performed by: INTERNAL MEDICINE

## 2021-06-24 PROCEDURE — 1126F PR PAIN SEVERITY QUANTIFIED, NO PAIN PRESENT: ICD-10-PCS | Mod: S$GLB,,, | Performed by: INTERNAL MEDICINE

## 2021-06-24 PROCEDURE — 99214 PR OFFICE/OUTPT VISIT, EST, LEVL IV, 30-39 MIN: ICD-10-PCS | Mod: S$GLB,,, | Performed by: INTERNAL MEDICINE

## 2021-06-24 PROCEDURE — 1159F PR MEDICATION LIST DOCUMENTED IN MEDICAL RECORD: ICD-10-PCS | Mod: S$GLB,,, | Performed by: INTERNAL MEDICINE

## 2021-06-24 PROCEDURE — 1159F MED LIST DOCD IN RCRD: CPT | Mod: S$GLB,,, | Performed by: INTERNAL MEDICINE

## 2021-06-24 PROCEDURE — 3288F FALL RISK ASSESSMENT DOCD: CPT | Mod: CPTII,S$GLB,, | Performed by: INTERNAL MEDICINE

## 2021-06-24 PROCEDURE — 3288F PR FALLS RISK ASSESSMENT DOCUMENTED: ICD-10-PCS | Mod: CPTII,S$GLB,, | Performed by: INTERNAL MEDICINE

## 2021-06-24 PROCEDURE — 1126F AMNT PAIN NOTED NONE PRSNT: CPT | Mod: S$GLB,,, | Performed by: INTERNAL MEDICINE

## 2021-06-24 PROCEDURE — 99999 PR PBB SHADOW E&M-EST. PATIENT-LVL II: ICD-10-PCS | Mod: PBBFAC,,, | Performed by: INTERNAL MEDICINE

## 2021-06-24 PROCEDURE — 1101F PR PT FALLS ASSESS DOC 0-1 FALLS W/OUT INJ PAST YR: ICD-10-PCS | Mod: CPTII,S$GLB,, | Performed by: INTERNAL MEDICINE

## 2021-06-24 PROCEDURE — 99999 PR PBB SHADOW E&M-EST. PATIENT-LVL II: CPT | Mod: PBBFAC,,, | Performed by: INTERNAL MEDICINE

## 2021-06-24 PROCEDURE — 99214 OFFICE O/P EST MOD 30 MIN: CPT | Mod: S$GLB,,, | Performed by: INTERNAL MEDICINE

## 2021-06-24 RX ORDER — TAMSULOSIN HYDROCHLORIDE 0.4 MG/1
0.4 CAPSULE ORAL NIGHTLY
COMMUNITY
Start: 2021-04-29

## 2021-08-08 ENCOUNTER — CLINICAL SUPPORT (OUTPATIENT)
Dept: URGENT CARE | Facility: CLINIC | Age: 81
End: 2021-08-08
Payer: MEDICARE

## 2021-08-08 DIAGNOSIS — Z20.822 EXPOSURE TO COVID-19 VIRUS: Primary | ICD-10-CM

## 2021-08-08 LAB
CTP QC/QA: YES
SARS-COV-2 RDRP RESP QL NAA+PROBE: NEGATIVE

## 2021-08-08 PROCEDURE — U0002 COVID-19 LAB TEST NON-CDC: HCPCS | Mod: QW,S$GLB,, | Performed by: NURSE PRACTITIONER

## 2021-08-08 PROCEDURE — U0002: ICD-10-PCS | Mod: QW,S$GLB,, | Performed by: NURSE PRACTITIONER

## 2021-10-06 ENCOUNTER — TELEPHONE (OUTPATIENT)
Dept: CARDIOLOGY | Facility: CLINIC | Age: 81
End: 2021-10-06

## 2021-11-17 ENCOUNTER — TELEPHONE (OUTPATIENT)
Dept: CARDIOLOGY | Facility: CLINIC | Age: 81
End: 2021-11-17
Payer: MEDICARE

## 2022-08-24 DIAGNOSIS — I48.0 PAROXYSMAL ATRIAL FIBRILLATION: Primary | ICD-10-CM

## 2022-08-26 ENCOUNTER — HOSPITAL ENCOUNTER (OUTPATIENT)
Dept: CARDIOLOGY | Facility: HOSPITAL | Age: 82
Discharge: HOME | End: 2022-08-26
Attending: INTERNAL MEDICINE
Payer: MEDICARE

## 2022-08-26 ENCOUNTER — ANESTHESIA (OUTPATIENT)
Dept: CARDIOLOGY | Facility: HOSPITAL | Age: 82
End: 2022-08-26
Payer: MEDICARE

## 2022-08-26 ENCOUNTER — ANESTHESIA EVENT (OUTPATIENT)
Dept: CARDIOLOGY | Facility: HOSPITAL | Age: 82
End: 2022-08-26
Payer: MEDICARE

## 2022-08-26 VITALS
DIASTOLIC BLOOD PRESSURE: 85 MMHG | TEMPERATURE: 99 F | OXYGEN SATURATION: 99 % | RESPIRATION RATE: 20 BRPM | SYSTOLIC BLOOD PRESSURE: 148 MMHG | HEART RATE: 42 BPM

## 2022-08-26 DIAGNOSIS — I48.0 PAROXYSMAL ATRIAL FIBRILLATION: ICD-10-CM

## 2022-08-26 DIAGNOSIS — Z01.818 PREOP TESTING: ICD-10-CM

## 2022-08-26 DIAGNOSIS — R29.898 WEAKNESS OF RIGHT LOWER EXTREMITY: Primary | ICD-10-CM

## 2022-08-26 LAB — EJECTION FRACTION: 30 %

## 2022-08-26 PROCEDURE — 37000008 HC ANESTHESIA 1ST 15 MINUTES

## 2022-08-26 PROCEDURE — 93010 EKG 12-LEAD: ICD-10-PCS | Mod: ,,, | Performed by: STUDENT IN AN ORGANIZED HEALTH CARE EDUCATION/TRAINING PROGRAM

## 2022-08-26 PROCEDURE — 93312 ECHO TRANSESOPHAGEAL: CPT

## 2022-08-26 PROCEDURE — 25000003 PHARM REV CODE 250: Performed by: NURSE ANESTHETIST, CERTIFIED REGISTERED

## 2022-08-26 PROCEDURE — 93010 ELECTROCARDIOGRAM REPORT: CPT | Mod: ,,, | Performed by: STUDENT IN AN ORGANIZED HEALTH CARE EDUCATION/TRAINING PROGRAM

## 2022-08-26 PROCEDURE — 37000009 HC ANESTHESIA EA ADD 15 MINS

## 2022-08-26 PROCEDURE — 63600175 PHARM REV CODE 636 W HCPCS: Performed by: NURSE ANESTHETIST, CERTIFIED REGISTERED

## 2022-08-26 PROCEDURE — 93005 ELECTROCARDIOGRAM TRACING: CPT

## 2022-08-26 RX ORDER — SODIUM CHLORIDE, SODIUM LACTATE, POTASSIUM CHLORIDE, CALCIUM CHLORIDE 600; 310; 30; 20 MG/100ML; MG/100ML; MG/100ML; MG/100ML
INJECTION, SOLUTION INTRAVENOUS CONTINUOUS
Status: CANCELLED | OUTPATIENT
Start: 2022-08-26

## 2022-08-26 RX ORDER — PROPOFOL 10 MG/ML
VIAL (ML) INTRAVENOUS
Status: DISCONTINUED | OUTPATIENT
Start: 2022-08-26 | End: 2022-08-26

## 2022-08-26 RX ORDER — ONDANSETRON 4 MG/1
8 TABLET, ORALLY DISINTEGRATING ORAL EVERY 8 HOURS PRN
Status: DISCONTINUED | OUTPATIENT
Start: 2022-08-26 | End: 2022-08-27 | Stop reason: HOSPADM

## 2022-08-26 RX ORDER — EPHEDRINE SULFATE 50 MG/ML
INJECTION, SOLUTION INTRAVENOUS
Status: DISCONTINUED | OUTPATIENT
Start: 2022-08-26 | End: 2022-08-26

## 2022-08-26 RX ORDER — ONDANSETRON 2 MG/ML
4 INJECTION INTRAMUSCULAR; INTRAVENOUS DAILY PRN
Status: CANCELLED | OUTPATIENT
Start: 2022-08-26

## 2022-08-26 RX ORDER — ACETAMINOPHEN 325 MG/1
650 TABLET ORAL EVERY 4 HOURS PRN
Status: DISCONTINUED | OUTPATIENT
Start: 2022-08-26 | End: 2022-08-27 | Stop reason: HOSPADM

## 2022-08-26 RX ORDER — SODIUM CHLORIDE, SODIUM GLUCONATE, SODIUM ACETATE, POTASSIUM CHLORIDE AND MAGNESIUM CHLORIDE 30; 37; 368; 526; 502 MG/100ML; MG/100ML; MG/100ML; MG/100ML; MG/100ML
1000 INJECTION, SOLUTION INTRAVENOUS CONTINUOUS
Status: CANCELLED | OUTPATIENT
Start: 2022-08-26 | End: 2022-09-25

## 2022-08-26 RX ADMIN — EPHEDRINE SULFATE 5 MG: 50 INJECTION INTRAVENOUS at 01:08

## 2022-08-26 RX ADMIN — EPHEDRINE SULFATE 10 MG: 50 INJECTION INTRAVENOUS at 01:08

## 2022-08-26 RX ADMIN — PROPOFOL 30 MG: 10 INJECTION, EMULSION INTRAVENOUS at 12:08

## 2022-08-26 RX ADMIN — PROPOFOL 10 MG: 10 INJECTION, EMULSION INTRAVENOUS at 12:08

## 2022-08-26 RX ADMIN — SODIUM CHLORIDE, SODIUM GLUCONATE, SODIUM ACETATE, POTASSIUM CHLORIDE AND MAGNESIUM CHLORIDE: 526; 502; 368; 37; 30 INJECTION, SOLUTION INTRAVENOUS at 12:08

## 2022-08-26 NOTE — ANESTHESIA POSTPROCEDURE EVALUATION
Anesthesia Post Evaluation    Patient: Alan Gutiérrez    Procedure(s) Performed: * No procedures listed *    Final Anesthesia Type: general      Patient location during evaluation: PACU  Patient participation: Yes- Able to Participate  Level of consciousness: awake and alert  Post-procedure vital signs: reviewed and stable  Pain management: adequate  Airway patency: patent      Anesthetic complications: no      Cardiovascular status: hemodynamically stable  Respiratory status: unassisted  Hydration status: euvolemic  Follow-up not needed.          Vitals Value Taken Time   /81 08/26/22 1432   Temp 36.7 08/26/22 1434   Pulse 44 08/26/22 1433   Resp 23 08/26/22 1433   SpO2 96 % 08/26/22 1433   Vitals shown include unvalidated device data.      No case tracking events are documented in the log.      Pain/Daysi Score: No data recorded

## 2022-08-26 NOTE — ANESTHESIA PREPROCEDURE EVALUATION
08/26/2022  Alan Gutiérrez is a 81 y.o., male with ----------------------------  A-fib  Cancer      Comment:  skin  Carotid artery stenosis      Comment:  8/26/2014: Carotid duplex: DIANNA: mild. LICA: critical -                3.4 m/s.  CHF (congestive heart failure)  Chronic anticoagulation  Heart failure, systolic and diastolic, chronic      Comment:  8/1/2016: Echo: Normal left ventricular size with mild                systolic dysfunction. EF 45%. Markedly dilated LA.                Moderate aortic valve sclerosis - 1.6 m/s.  Herniated disc  Hypercholesterolemia      Comment:  2004: Began statin.  Hypertension  Hypertension      Comment:  1990: Diagnosed.  Lipids abnormal  OA (osteoarthritis)  Osteopenia  Peripheral artery disease      Comment:  10/25/2016: Arterial Duplex: Right: SFA: Distal 1.8 m/s.               Left: No obstructive disease above knee.  Stroke      Comment:  2004  And ----------------------------  Bladder tumor excision  Carotid endarterectomy      Comment:  Left/right  Cataract extraction, bilateral  Eye surgery      Comment:  bilateral kory  Hernia repair  Joint replacement  Knee scope  Left heart catheterization  Radiofrequency ablation bone      Comment:  back  Skin biopsy      Comment:  ca  Tonsillectomy  Tumors removed chest    Here for MANDY/possible cardioversion.      Pre-op Assessment    I have reviewed the NPO Status.      Review of Systems         Anesthesia Plan  Type of Anesthesia, risks & benefits discussed:    Anesthesia Type: Gen Natural Airway  Intra-op Monitoring Plan: Standard ASA Monitors  Post Op Pain Control Plan: IV/PO Opioids PRN  Induction:  IV  Airway Plan: Direct  Informed Consent: Informed consent signed with the Patient and all parties understand the risks and agree with anesthesia plan.  All questions answered. Patient consented to blood products? No  ASA  Score: 3  Day of Surgery Review of History & Physical: H&P Update referred to the surgeon/provider.  Anesthesia Plan Notes: Nasal cannula vs facemask supplemental oxygenation   For patients with ZHENG/obesity, may consider SuperNoval Nasal CPAP      Ready For Surgery From Anesthesia Perspective.     .

## 2022-08-26 NOTE — DISCHARGE SUMMARY
[facility] - Cath Lab Services  Discharge Note  Short Stay    * No procedures listed *    OUTCOME: Patient tolerated treatment/procedure well without complication and is now ready for discharge.    DISPOSITION: Home or Self Care    FINAL DIAGNOSIS:  <principal problem not specified>    FOLLOWUP: In clinic    DISCHARGE INSTRUCTIONS:  No discharge procedures on file.     TIME SPENT ON DISCHARGE: 30 minutes

## 2022-08-26 NOTE — TRANSFER OF CARE
Anesthesia Transfer of Care Note    Patient: Alan Gutiérrez    Procedure(s) Performed: * No procedures listed *    Patient location: [facility]    Anesthesia Type: general    Transport from OR: Transported from OR on room air with adequate spontaneous ventilation    Post pain: adequate analgesia    Post assessment: no apparent anesthetic complications    Post vital signs: stable    Level of consciousness: awake, alert and oriented    Nausea/Vomiting: no nausea/vomiting    Complications: none    Transfer of care protocol was followed      Last vitals:   Visit Vitals  BP (!) 112/57 (BP Location: Right arm, Patient Position: Lying)   Pulse (!) 37   Temp 37.1 °C (98.8 °F) (Oral)   Resp 16   SpO2 100%

## 2022-09-02 ENCOUNTER — INFUSION (OUTPATIENT)
Dept: INFECTIOUS DISEASES | Facility: HOSPITAL | Age: 82
End: 2022-09-02
Attending: NURSE PRACTITIONER
Payer: MEDICARE

## 2022-09-02 VITALS
OXYGEN SATURATION: 97 % | SYSTOLIC BLOOD PRESSURE: 117 MMHG | TEMPERATURE: 98 F | RESPIRATION RATE: 18 BRPM | DIASTOLIC BLOOD PRESSURE: 66 MMHG | BODY MASS INDEX: 26.07 KG/M2 | WEIGHT: 172 LBS | HEIGHT: 68 IN | HEART RATE: 85 BPM

## 2022-09-02 DIAGNOSIS — U07.1 COVID: Primary | ICD-10-CM

## 2022-09-02 PROCEDURE — M0222 HC IV INJECTION, BEBTELOVIMAB, INCL POST ADMIN MONIT: HCPCS

## 2022-09-02 PROCEDURE — 63600175 PHARM REV CODE 636 W HCPCS

## 2022-09-02 RX ORDER — ACETAMINOPHEN 325 MG/1
650 TABLET ORAL
Status: ACTIVE | OUTPATIENT
Start: 2022-09-02 | End: 2022-09-03

## 2022-09-02 RX ORDER — BEBTELOVIMAB 87.5 MG/ML
175 INJECTION, SOLUTION INTRAVENOUS
Status: COMPLETED | OUTPATIENT
Start: 2022-09-02 | End: 2022-09-02

## 2022-09-02 RX ORDER — EPINEPHRINE 0.3 MG/.3ML
0.3 INJECTION SUBCUTANEOUS
Status: ACTIVE | OUTPATIENT
Start: 2022-09-02 | End: 2022-09-05

## 2022-09-02 RX ORDER — ONDANSETRON 4 MG/1
4 TABLET, ORALLY DISINTEGRATING ORAL
Status: ACTIVE | OUTPATIENT
Start: 2022-09-02 | End: 2022-09-03

## 2022-09-02 RX ORDER — DIPHENHYDRAMINE HYDROCHLORIDE 50 MG/ML
25 INJECTION INTRAMUSCULAR; INTRAVENOUS
Status: ACTIVE | OUTPATIENT
Start: 2022-09-02 | End: 2022-09-03

## 2022-09-02 RX ORDER — ALBUTEROL SULFATE 90 UG/1
2 AEROSOL, METERED RESPIRATORY (INHALATION)
Status: ACTIVE | OUTPATIENT
Start: 2022-09-02 | End: 2022-09-05

## 2022-09-02 RX ADMIN — BEBTELOVIMAB 175 MG: 87.5 INJECTION, SOLUTION INTRAVENOUS at 11:09

## 2022-09-02 NOTE — PROGRESS NOTES
No signs/symptoms of reaction to bebtelovimab noted after one hour post administration monitoring period.  Provided patient with AVS, no questions.  Escorted patient to exit.

## 2022-10-20 ENCOUNTER — HOSPITAL ENCOUNTER (OUTPATIENT)
Dept: CARDIOLOGY | Facility: HOSPITAL | Age: 82
Discharge: HOME | End: 2022-10-20
Attending: INTERNAL MEDICINE
Payer: MEDICARE

## 2022-10-20 ENCOUNTER — ANESTHESIA (OUTPATIENT)
Dept: CARDIOLOGY | Facility: HOSPITAL | Age: 82
End: 2022-10-20
Payer: MEDICARE

## 2022-10-20 ENCOUNTER — ANESTHESIA EVENT (OUTPATIENT)
Dept: CARDIOLOGY | Facility: HOSPITAL | Age: 82
End: 2022-10-20
Payer: MEDICARE

## 2022-10-20 VITALS
BODY MASS INDEX: 25.01 KG/M2 | DIASTOLIC BLOOD PRESSURE: 56 MMHG | SYSTOLIC BLOOD PRESSURE: 105 MMHG | TEMPERATURE: 98 F | OXYGEN SATURATION: 94 % | WEIGHT: 165 LBS | HEART RATE: 38 BPM | RESPIRATION RATE: 19 BRPM | HEIGHT: 68 IN

## 2022-10-20 DIAGNOSIS — I48.0 PAROXYSMAL ATRIAL FIBRILLATION: Primary | ICD-10-CM

## 2022-10-20 DIAGNOSIS — I48.19 PERSISTENT ATRIAL FIBRILLATION: Primary | ICD-10-CM

## 2022-10-20 DIAGNOSIS — I48.0 PAROXYSMAL ATRIAL FIBRILLATION: ICD-10-CM

## 2022-10-20 DIAGNOSIS — Z92.89 HISTORY OF CARDIOVERSION: ICD-10-CM

## 2022-10-20 DIAGNOSIS — I48.91 A-FIB: ICD-10-CM

## 2022-10-20 DIAGNOSIS — Z01.818 PREOP TESTING: ICD-10-CM

## 2022-10-20 PROCEDURE — 92960 CARDIOVERSION ELECTRIC EXT: CPT

## 2022-10-20 PROCEDURE — 93005 ELECTROCARDIOGRAM TRACING: CPT

## 2022-10-20 PROCEDURE — 25000003 PHARM REV CODE 250: Performed by: NURSE ANESTHETIST, CERTIFIED REGISTERED

## 2022-10-20 PROCEDURE — 37000009 HC ANESTHESIA EA ADD 15 MINS: Performed by: INTERNAL MEDICINE

## 2022-10-20 PROCEDURE — 93010 ELECTROCARDIOGRAM REPORT: CPT | Mod: ,,, | Performed by: INTERNAL MEDICINE

## 2022-10-20 PROCEDURE — 37000008 HC ANESTHESIA 1ST 15 MINUTES: Performed by: INTERNAL MEDICINE

## 2022-10-20 PROCEDURE — 93010 EKG 12-LEAD: ICD-10-PCS | Mod: ,,, | Performed by: INTERNAL MEDICINE

## 2022-10-20 PROCEDURE — 63600175 PHARM REV CODE 636 W HCPCS: Performed by: NURSE ANESTHETIST, CERTIFIED REGISTERED

## 2022-10-20 RX ORDER — SODIUM CHLORIDE, SODIUM LACTATE, POTASSIUM CHLORIDE, CALCIUM CHLORIDE 600; 310; 30; 20 MG/100ML; MG/100ML; MG/100ML; MG/100ML
INJECTION, SOLUTION INTRAVENOUS CONTINUOUS
Status: CANCELLED | OUTPATIENT
Start: 2022-10-20

## 2022-10-20 RX ORDER — PROPOFOL 10 MG/ML
VIAL (ML) INTRAVENOUS
Status: DISCONTINUED | OUTPATIENT
Start: 2022-10-20 | End: 2022-10-20

## 2022-10-20 RX ORDER — METOPROLOL TARTRATE 100 MG/1
100 TABLET ORAL 2 TIMES DAILY
COMMUNITY
Start: 2022-09-29 | End: 2022-10-20 | Stop reason: HOSPADM

## 2022-10-20 RX ORDER — EPHEDRINE SULFATE 50 MG/ML
INJECTION, SOLUTION INTRAVENOUS
Status: DISCONTINUED | OUTPATIENT
Start: 2022-10-20 | End: 2022-10-20

## 2022-10-20 RX ORDER — UBIDECARENONE 30 MG
30 CAPSULE ORAL DAILY
COMMUNITY

## 2022-10-20 RX ORDER — AMIODARONE HYDROCHLORIDE 200 MG/1
200 TABLET ORAL DAILY
COMMUNITY
Start: 2022-10-15

## 2022-10-20 RX ORDER — LIDOCAINE HYDROCHLORIDE 10 MG/ML
1 INJECTION, SOLUTION EPIDURAL; INFILTRATION; INTRACAUDAL; PERINEURAL ONCE
Status: CANCELLED | OUTPATIENT
Start: 2022-10-20 | End: 2022-10-20

## 2022-10-20 RX ORDER — AMIODARONE HYDROCHLORIDE 400 MG/1
400 TABLET ORAL 2 TIMES DAILY
COMMUNITY
Start: 2022-10-13 | End: 2022-10-20

## 2022-10-20 RX ADMIN — EPHEDRINE SULFATE 10 MG: 50 INJECTION INTRAVENOUS at 07:10

## 2022-10-20 RX ADMIN — PROPOFOL 40 MG: 10 INJECTION, EMULSION INTRAVENOUS at 07:10

## 2022-10-20 RX ADMIN — SODIUM CHLORIDE, SODIUM GLUCONATE, SODIUM ACETATE, POTASSIUM CHLORIDE AND MAGNESIUM CHLORIDE: 526; 502; 368; 37; 30 INJECTION, SOLUTION INTRAVENOUS at 07:10

## 2022-10-20 NOTE — DISCHARGE SUMMARY
[facility] - Cath Lab Services  Discharge Note  Short Stay    Cardioversion      OUTCOME: Patient tolerated treatment/procedure well without complication and is now ready for discharge.    DISPOSITION: Home or Self Care    FINAL DIAGNOSIS:  Atrial fibrillation    FOLLOWUP: In clinic    DISCHARGE INSTRUCTIONS:    Discharge Procedure Orders   Diet general        TIME SPENT ON DISCHARGE: 15 minutes

## 2022-10-20 NOTE — TRANSFER OF CARE
"Anesthesia Transfer of Care Note    Patient: Alan Gutiérrez    Procedure(s) Performed: * No procedures listed *    Patient location: OPS    Anesthesia Type: MAC    Transport from OR: Transported from OR on room air with adequate spontaneous ventilation    Post pain: adequate analgesia    Post assessment: no apparent anesthetic complications    Post vital signs: stable    Level of consciousness: awake, alert and oriented    Nausea/Vomiting: no nausea/vomiting    Complications: none    Transfer of care protocol was followed      Last vitals:   Visit Vitals  BP (!) 94/51   Pulse (!) 34   Temp 36.5 °C (97.7 °F)   Resp 16   Ht 5' 8" (1.727 m)   Wt 74.8 kg (165 lb)   SpO2 97%   BMI 25.09 kg/m²     "

## 2022-10-20 NOTE — PROGRESS NOTES
Per Dr. Perea, due to bradycardia post cardioversion, ambulate pt before discharge. Pt has just finished ambulating, still bradycardic, & is asymptomatic. Pt in stable condition, post ambulation.

## 2022-10-20 NOTE — ANESTHESIA POSTPROCEDURE EVALUATION
Anesthesia Post Evaluation    Patient: Alan Gutiérrez    Procedure(s) Performed: * No procedures listed *    Final Anesthesia Type: general      Patient location during evaluation: Cath Lab  Patient participation: Yes- Able to Participate  Level of consciousness: awake and oriented  Post-procedure vital signs: reviewed and stable  Pain management: adequate  Airway patency: patent    PONV status at discharge: No PONV  Anesthetic complications: no      Cardiovascular status: stable, hemodynamically stable and bradycardic  Respiratory status: unassisted and spontaneous ventilation  Hydration status: euvolemic  Follow-up not needed.          Vitals Value Taken Time   /56 10/20/22 0900   Temp 36.5 °C (97.7 °F) 10/20/22 0802   Pulse 38 10/20/22 0900   Resp 19 10/20/22 0855   SpO2 94 % 10/20/22 0900   Vitals shown include unvalidated device data.      No case tracking events are documented in the log.      Pain/Daysi Score: Daysi Score: 10 (10/20/2022  8:02 AM)

## 2022-10-20 NOTE — ANESTHESIA PREPROCEDURE EVALUATION
10/20/2022  Alan Gutiérrez is a 81 y.o., male, w/ A.Fib. w/ RVR, who presents for a repeat DCCV    .      Pre-op Assessment    I have reviewed the Patient Summary Reports.    I have reviewed the NPO Status.   I have reviewed the Medications.     Review of Systems  Anesthesia Hx:  No problems with previous Anesthesia    Social:  Non-Smoker    Cardiovascular:   Hypertension CHF PVD hyperlipidemia A. Fib w/ RVR  CHF (EF 30%)   Pulmonary:  Pulmonary Normal    Musculoskeletal:   Arthritis     Neurological:   CVA    Endocrine:  Endocrine Normal        Physical Exam  General: Alert and Oriented    Airway:  Mouth Opening: Normal  TM Distance: Normal  Tongue: Normal  Neck ROM: Normal ROM    Chest/Lungs:  Normal Respiratory Rate    Heart:  Rate: Normal  Rhythm: Irregularly Irregular        Anesthesia Plan  Type of Anesthesia, risks & benefits discussed:    Anesthesia Type: Gen Natural Airway  Intra-op Monitoring Plan: Standard ASA Monitors  Post Op Pain Control Plan: IV/PO Opioids PRN  Induction:  IV  Airway Plan: Direct  Informed Consent: Informed consent signed with the Patient and all parties understand the risks and agree with anesthesia plan.  All questions answered. Patient consented to blood products? No  ASA Score: 3  Day of Surgery Review of History & Physical: H&P Update referred to the surgeon/provider.  Anesthesia Plan Notes: Nasal cannula vs facemask supplemental oxygenation   For patients with ZHENG/obesity, may consider SuperNoval Nasal CPAP      Ready For Surgery From Anesthesia Perspective.     .  MANDY (8/26/22) :    The left ventricle is moderately enlarged with moderately decreased systolic function.   Left ventricular diastolic dysfunction.   Severe left atrial enlargement.   The estimated ejection fraction is 30%.   A 200 J synchronized cardioversion was successfully performed with restoration  of normal sinus rhythm.   No thrombus is present in the appendage.     MANDY revealed absence of JU and LA thrombus  Successful cardioversion to 200 J  Continue anticoagulants

## 2022-10-20 NOTE — DISCHARGE INSTRUCTIONS
Go to ER if unusual symptoms occur. Avoid caffeine x 3 days. If sites where shocks were delivered are sore/tender, may apply hydrocortisone cream or aloe vera lotion. No driving x 24 hours. Stop taking Metoprolol until follow up visit with Dr. Perea.
